# Patient Record
Sex: FEMALE | Race: OTHER | HISPANIC OR LATINO | Employment: FULL TIME | URBAN - METROPOLITAN AREA
[De-identification: names, ages, dates, MRNs, and addresses within clinical notes are randomized per-mention and may not be internally consistent; named-entity substitution may affect disease eponyms.]

---

## 2017-04-13 ENCOUNTER — HOSPITAL ENCOUNTER (EMERGENCY)
Facility: HOSPITAL | Age: 27
Discharge: HOME | End: 2017-04-13
Attending: EMERGENCY MEDICINE | Admitting: EMERGENCY MEDICINE
Payer: COMMERCIAL

## 2017-04-13 VITALS
OXYGEN SATURATION: 98 % | HEART RATE: 72 BPM | BODY MASS INDEX: 20.01 KG/M2 | TEMPERATURE: 98.1 F | SYSTOLIC BLOOD PRESSURE: 99 MMHG | RESPIRATION RATE: 18 BRPM | WEIGHT: 124 LBS | DIASTOLIC BLOOD PRESSURE: 55 MMHG

## 2017-04-13 DIAGNOSIS — B37.3 VULVOVAGINAL CANDIDIASIS: Primary | ICD-10-CM

## 2017-04-13 LAB
BACTERIA UR QL AUTO: ABNORMAL /HPF
BILIRUB UR QL STRIP: ABNORMAL
CLARITY UR: CLEAR
COLOR UR: YELLOW
GLUCOSE UR STRIP-MCNC: NEGATIVE MG/DL
HCG UR QL: NEGATIVE
HGB UR QL STRIP.AUTO: NEGATIVE
KETONES UR STRIP-MCNC: ABNORMAL MG/DL
LEUKOCYTE ESTERASE UR QL STRIP: ABNORMAL
MUCOUS THREADS UR QL AUTO: ABNORMAL
NITRITE UR QL STRIP: NEGATIVE
NON-SQ EPI CELLS URNS QL MICRO: ABNORMAL /HPF
PH UR STRIP.AUTO: 6 [PH] (ref 4.5–8)
PROT UR STRIP-MCNC: ABNORMAL MG/DL
RBC #/AREA URNS AUTO: ABNORMAL /HPF
SP GR UR STRIP.AUTO: 1.02 (ref 1–1.03)
UROBILINOGEN UR QL STRIP.AUTO: 1 E.U./DL
WBC #/AREA URNS AUTO: ABNORMAL /HPF

## 2017-04-13 PROCEDURE — 81002 URINALYSIS NONAUTO W/O SCOPE: CPT | Performed by: PHYSICIAN ASSISTANT

## 2017-04-13 PROCEDURE — 81001 URINALYSIS AUTO W/SCOPE: CPT

## 2017-04-13 PROCEDURE — 87086 URINE CULTURE/COLONY COUNT: CPT

## 2017-04-13 PROCEDURE — 99283 EMERGENCY DEPT VISIT LOW MDM: CPT

## 2017-04-13 PROCEDURE — 81025 URINE PREGNANCY TEST: CPT | Performed by: PHYSICIAN ASSISTANT

## 2017-04-13 RX ORDER — FLUCONAZOLE 150 MG/1
150 TABLET ORAL ONCE
Status: COMPLETED | OUTPATIENT
Start: 2017-04-13 | End: 2017-04-13

## 2017-04-13 RX ADMIN — FLUCONAZOLE 150 MG: 150 TABLET ORAL at 06:46

## 2017-04-14 LAB — BACTERIA UR CULT: NORMAL

## 2017-07-13 ENCOUNTER — HOSPITAL ENCOUNTER (EMERGENCY)
Facility: HOSPITAL | Age: 27
Discharge: HOME | End: 2017-07-13
Admitting: EMERGENCY MEDICINE
Payer: COMMERCIAL

## 2017-07-13 VITALS
RESPIRATION RATE: 16 BRPM | BODY MASS INDEX: 21.79 KG/M2 | WEIGHT: 135 LBS | OXYGEN SATURATION: 99 % | DIASTOLIC BLOOD PRESSURE: 54 MMHG | SYSTOLIC BLOOD PRESSURE: 116 MMHG | TEMPERATURE: 99.1 F | HEART RATE: 90 BPM

## 2017-07-13 DIAGNOSIS — T63.441A BEE STING: Primary | ICD-10-CM

## 2017-07-13 PROCEDURE — 99282 EMERGENCY DEPT VISIT SF MDM: CPT

## 2017-07-13 RX ORDER — NAPROXEN 500 MG/1
500 TABLET ORAL 2 TIMES DAILY WITH MEALS
Qty: 30 TABLET | Refills: 0 | Status: SHIPPED | OUTPATIENT
Start: 2017-07-13 | End: 2017-12-08

## 2017-12-08 ENCOUNTER — APPOINTMENT (EMERGENCY)
Dept: ULTRASOUND IMAGING | Facility: HOSPITAL | Age: 27
End: 2017-12-08
Payer: COMMERCIAL

## 2017-12-08 ENCOUNTER — HOSPITAL ENCOUNTER (EMERGENCY)
Facility: HOSPITAL | Age: 27
Discharge: HOME | End: 2017-12-08
Attending: EMERGENCY MEDICINE | Admitting: EMERGENCY MEDICINE
Payer: COMMERCIAL

## 2017-12-08 VITALS
WEIGHT: 118.83 LBS | RESPIRATION RATE: 17 BRPM | BODY MASS INDEX: 19.18 KG/M2 | HEART RATE: 72 BPM | SYSTOLIC BLOOD PRESSURE: 122 MMHG | OXYGEN SATURATION: 100 % | DIASTOLIC BLOOD PRESSURE: 87 MMHG | TEMPERATURE: 98.4 F

## 2017-12-08 DIAGNOSIS — Z20.2 EXPOSURE TO SEXUALLY TRANSMITTED DISEASE (STD): ICD-10-CM

## 2017-12-08 DIAGNOSIS — R10.9 ABDOMINAL CRAMPING: ICD-10-CM

## 2017-12-08 DIAGNOSIS — N73.0 PID (ACUTE PELVIC INFLAMMATORY DISEASE): Primary | ICD-10-CM

## 2017-12-08 LAB
ALBUMIN SERPL BCP-MCNC: 4 G/DL (ref 3.5–5)
ALP SERPL-CCNC: 60 U/L (ref 46–116)
ALT SERPL W P-5'-P-CCNC: 43 U/L (ref 12–78)
ANION GAP SERPL CALCULATED.3IONS-SCNC: 9 MMOL/L (ref 4–13)
AST SERPL W P-5'-P-CCNC: 23 U/L (ref 5–45)
BASOPHILS # BLD AUTO: 0.04 THOUSANDS/ΜL (ref 0–0.1)
BASOPHILS NFR BLD AUTO: 1 % (ref 0–1)
BILIRUB SERPL-MCNC: 0.36 MG/DL (ref 0.2–1)
BILIRUB UR QL STRIP: NEGATIVE
BUN SERPL-MCNC: 11 MG/DL (ref 5–25)
CALCIUM SERPL-MCNC: 9.1 MG/DL (ref 8.3–10.1)
CHLORIDE SERPL-SCNC: 106 MMOL/L (ref 100–108)
CLARITY UR: CLEAR
CO2 SERPL-SCNC: 26 MMOL/L (ref 21–32)
COLOR UR: YELLOW
COLOR, POC: YELLOW
CREAT SERPL-MCNC: 0.64 MG/DL (ref 0.6–1.3)
EOSINOPHIL # BLD AUTO: 0.1 THOUSAND/ΜL (ref 0–0.61)
EOSINOPHIL NFR BLD AUTO: 1 % (ref 0–6)
ERYTHROCYTE [DISTWIDTH] IN BLOOD BY AUTOMATED COUNT: 15 % (ref 11.6–15.1)
EXT PREG TEST URINE: NEGATIVE
GFR SERPL CREATININE-BSD FRML MDRD: 123 ML/MIN/1.73SQ M
GLUCOSE SERPL-MCNC: 85 MG/DL (ref 65–140)
GLUCOSE UR STRIP-MCNC: NEGATIVE MG/DL
HCT VFR BLD AUTO: 34 % (ref 34.8–46.1)
HGB BLD-MCNC: 11.1 G/DL (ref 11.5–15.4)
HGB UR QL STRIP.AUTO: NEGATIVE
HIV 1+2 AB+HIV1 P24 AG SERPL QL IA: NORMAL
HIV1 P24 AG SER QL: NORMAL
KETONES UR STRIP-MCNC: NEGATIVE MG/DL
LEUKOCYTE ESTERASE UR QL STRIP: NEGATIVE
LIPASE SERPL-CCNC: 157 U/L (ref 73–393)
LYMPHOCYTES # BLD AUTO: 1.86 THOUSANDS/ΜL (ref 0.6–4.47)
LYMPHOCYTES NFR BLD AUTO: 26 % (ref 14–44)
MCH RBC QN AUTO: 27.8 PG (ref 26.8–34.3)
MCHC RBC AUTO-ENTMCNC: 32.6 G/DL (ref 31.4–37.4)
MCV RBC AUTO: 85 FL (ref 82–98)
MONOCYTES # BLD AUTO: 0.67 THOUSAND/ΜL (ref 0.17–1.22)
MONOCYTES NFR BLD AUTO: 10 % (ref 4–12)
NEUTROPHILS # BLD AUTO: 4.39 THOUSANDS/ΜL (ref 1.85–7.62)
NEUTS SEG NFR BLD AUTO: 62 % (ref 43–75)
NITRITE UR QL STRIP: NEGATIVE
NRBC BLD AUTO-RTO: 0 /100 WBCS
PH UR STRIP.AUTO: 6 [PH] (ref 4.5–8)
PLATELET # BLD AUTO: 136 THOUSANDS/UL (ref 149–390)
PMV BLD AUTO: 12.1 FL (ref 8.9–12.7)
POTASSIUM SERPL-SCNC: 3.6 MMOL/L (ref 3.5–5.3)
PROT SERPL-MCNC: 6.9 G/DL (ref 6.4–8.2)
PROT UR STRIP-MCNC: NEGATIVE MG/DL
RBC # BLD AUTO: 4 MILLION/UL (ref 3.81–5.12)
SODIUM SERPL-SCNC: 141 MMOL/L (ref 136–145)
SP GR UR STRIP.AUTO: 1.02 (ref 1–1.03)
UROBILINOGEN UR QL STRIP.AUTO: 0.2 E.U./DL
WBC # BLD AUTO: 7.06 THOUSAND/UL (ref 4.31–10.16)

## 2017-12-08 PROCEDURE — 85025 COMPLETE CBC W/AUTO DIFF WBC: CPT | Performed by: EMERGENCY MEDICINE

## 2017-12-08 PROCEDURE — 81002 URINALYSIS NONAUTO W/O SCOPE: CPT | Performed by: EMERGENCY MEDICINE

## 2017-12-08 PROCEDURE — 76830 TRANSVAGINAL US NON-OB: CPT

## 2017-12-08 PROCEDURE — 87591 N.GONORRHOEAE DNA AMP PROB: CPT | Performed by: EMERGENCY MEDICINE

## 2017-12-08 PROCEDURE — 76856 US EXAM PELVIC COMPLETE: CPT

## 2017-12-08 PROCEDURE — 99284 EMERGENCY DEPT VISIT MOD MDM: CPT

## 2017-12-08 PROCEDURE — 81025 URINE PREGNANCY TEST: CPT | Performed by: EMERGENCY MEDICINE

## 2017-12-08 PROCEDURE — 87491 CHLMYD TRACH DNA AMP PROBE: CPT | Performed by: EMERGENCY MEDICINE

## 2017-12-08 PROCEDURE — 96372 THER/PROPH/DIAG INJ SC/IM: CPT

## 2017-12-08 PROCEDURE — 80053 COMPREHEN METABOLIC PANEL: CPT | Performed by: EMERGENCY MEDICINE

## 2017-12-08 PROCEDURE — 81003 URINALYSIS AUTO W/O SCOPE: CPT

## 2017-12-08 PROCEDURE — 36415 COLL VENOUS BLD VENIPUNCTURE: CPT | Performed by: EMERGENCY MEDICINE

## 2017-12-08 PROCEDURE — 83690 ASSAY OF LIPASE: CPT | Performed by: EMERGENCY MEDICINE

## 2017-12-08 PROCEDURE — 87806 HIV AG W/HIV1&2 ANTB W/OPTIC: CPT | Performed by: EMERGENCY MEDICINE

## 2017-12-08 RX ORDER — DOXYCYCLINE HYCLATE 100 MG/1
100 CAPSULE ORAL ONCE
Status: COMPLETED | OUTPATIENT
Start: 2017-12-08 | End: 2017-12-08

## 2017-12-08 RX ORDER — DOXYCYCLINE HYCLATE 100 MG/1
100 CAPSULE ORAL 2 TIMES DAILY
Qty: 27 CAPSULE | Refills: 0 | Status: SHIPPED | OUTPATIENT
Start: 2017-12-08 | End: 2017-12-22

## 2017-12-08 RX ADMIN — DOXYCYCLINE 100 MG: 100 CAPSULE ORAL at 19:12

## 2017-12-08 RX ADMIN — LIDOCAINE HYDROCHLORIDE 250 MG: 10 INJECTION, SOLUTION EPIDURAL; INFILTRATION; INTRACAUDAL; PERINEURAL at 19:12

## 2017-12-08 NOTE — ED PROVIDER NOTES
History  Chief Complaint   Patient presents with    Vaginal Discharge     Lower abdominal cramping, right flank pain, "way way way too much discharge" from vagina "off-color, not clear " Denies urinary symptoms  51-year-old female comes in for evaluation lower abdominal pain  She also states I have way too much discharge  Has been going on for 1 week  She denies that she could be pregnant  She does state she does have a boyfriend who was given her STDs x 5 and she is still with him  Documented gonorrhea and chlamydia in the past   She notes that she has cramping; bilateral lower quadrants  History of appendectomy  Has never had this before  Would like to be tested for HIV  Denies vomiting; but has had nausea  Subjective fevers; but is afebrile here  Has not been taking anything for symptoms  She denies dysuria; frequency  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 therefore she states she has pyelonephritis frequently  Her vital signs are unremarkable  On physical exam she is very tender to palpation bilateral lower quadrants  Also suprapubic  She does have white to yellow discharge; on pelvic exam   Very tender on bimanual exam   HIV screening per patient  Basic lab work  Gonorrhea/chlamydia culture swab obtained  Transvaginal ultrasound  None       Past Medical History:   Diagnosis Date    Depression     History of positive PPD     Known health problems: none     Pelvic kidney     Pelvic kidney     left side       Past Surgical History:   Procedure Laterality Date    APPENDECTOMY         Family History   Problem Relation Age of Onset    Crohn's disease Brother     Crohn's disease Maternal Aunt     Diabetes Maternal Grandmother     Diabetes Maternal Grandfather     Diabetes Paternal Grandmother     Diabetes Paternal Grandfather      I have reviewed and agree with the history as documented      Social History   Substance Use Topics    Smoking status: Light Tobacco Smoker    Smokeless tobacco: Never Used    Alcohol use Yes        Review of Systems   Constitutional: Positive for fever  Negative for activity change, appetite change and chills  HENT: Negative for congestion, ear pain, rhinorrhea, sore throat and trouble swallowing  Eyes: Negative for photophobia and pain  Respiratory: Negative for cough, chest tightness, shortness of breath and wheezing  Cardiovascular: Negative for chest pain and palpitations  Gastrointestinal: Positive for nausea  Negative for abdominal distention, abdominal pain, constipation, diarrhea and vomiting  Genitourinary: Positive for flank pain, vaginal discharge and vaginal pain  Negative for difficulty urinating, dysuria, frequency, hematuria and urgency  Musculoskeletal: Negative for arthralgias, back pain, joint swelling, neck pain and neck stiffness  Skin: Negative for color change, pallor and rash  Neurological: Negative for dizziness, seizures, weakness, light-headedness and headaches  Hematological: Negative for adenopathy  Psychiatric/Behavioral: Negative for confusion, hallucinations and self-injury  Physical Exam  ED Triage Vitals [12/08/17 1615]   Temperature Pulse Respirations Blood Pressure SpO2   98 2 °F (36 8 °C) 95 18 110/52 100 %      Temp Source Heart Rate Source Patient Position - Orthostatic VS BP Location FiO2 (%)   Oral Monitor Sitting Right arm --      Pain Score       8           Orthostatic Vital Signs  Vitals:    12/08/17 1615 12/08/17 1849 12/08/17 2058   BP: 110/52 116/82 122/87   Pulse: 95 68 72   Patient Position - Orthostatic VS: Sitting         Physical Exam   Constitutional: She is oriented to person, place, and time  She appears well-developed and well-nourished  No distress  HENT:   Head: Normocephalic and atraumatic  Right Ear: External ear normal    Left Ear: External ear normal    Mouth/Throat: Oropharynx is clear and moist  No oropharyngeal exudate     Eyes: Conjunctivae and EOM are normal  Pupils are equal, round, and reactive to light  Right eye exhibits no discharge  Left eye exhibits no discharge  Neck: Normal range of motion  Neck supple  No tracheal deviation present  No thyromegaly present  Cardiovascular: Normal rate, normal heart sounds and intact distal pulses  No murmur heard  Pulmonary/Chest: Effort normal and breath sounds normal  No respiratory distress  She has no wheezes  She has no rales  Abdominal: Soft  Bowel sounds are normal  She exhibits no distension  There is tenderness  There is no rebound and no guarding  Patient is very sensitive palpation bilateral lower quadrants  Also suprapubic region  Genitourinary: Vaginal discharge found  Genitourinary Comments: On pelvic exam does have white/yellow vaginal discharge  Relatively significant amount  Cervix was visualized without abnormality  Culture was taken  Bimanual exam will only allow very limited palpation as extremely sensitive  Abdomen though overall is soft and does not appear peritoneal in nature  Musculoskeletal: Normal range of motion  She exhibits no edema or deformity  Lymphadenopathy:     She has no cervical adenopathy  Neurological: She is alert and oriented to person, place, and time  She exhibits normal muscle tone  Skin: Skin is warm  No rash noted  She is not diaphoretic  Psychiatric: She has a normal mood and affect   Her behavior is normal        ED Medications  Medications   cefTRIAXone (ROCEPHIN) 250 mg in lidocaine (PF) (XYLOCAINE-MPF) 1 % IM only syringe (250 mg Intramuscular Given 12/8/17 1912)   doxycycline hyclate (VIBRAMYCIN) capsule 100 mg (100 mg Oral Given 12/8/17 1912)       Diagnostic Studies  Results Reviewed     Procedure Component Value Units Date/Time    Rapid HIV 1/2 AB-AG Combo [[id number]]  (Normal) Collected:  12/08/17 1840    Lab Status:  Final result Specimen:  Blood from Arm, Right Updated:  12/08/17 1920     Rapid HIV 1 AND 2 Non-Reactive     HIV-1 P24 Ag Screen Non-Reactive    Narrative:         Negative for HIV-1 p24 Antigen  Negative for HIV-1 and/or HIV-2 Antibody  Comprehensive metabolic panel [[id number]] Collected:  12/08/17 1840    Lab Status:  Final result Specimen:  Blood from Arm, Right Updated:  12/08/17 1902     Sodium 141 mmol/L      Potassium 3 6 mmol/L      Chloride 106 mmol/L      CO2 26 mmol/L      Anion Gap 9 mmol/L      BUN 11 mg/dL      Creatinine 0 64 mg/dL      Glucose 85 mg/dL      Calcium 9 1 mg/dL      AST 23 U/L      ALT 43 U/L      Alkaline Phosphatase 60 U/L      Total Protein 6 9 g/dL      Albumin 4 0 g/dL      Total Bilirubin 0 36 mg/dL      eGFR 123 ml/min/1 73sq m     Narrative:         National Kidney Disease Education Program recommendations are as follows:  GFR calculation is accurate only with a steady state creatinine  Chronic Kidney disease less than 60 ml/min/1 73 sq  meters  Kidney failure less than 15 ml/min/1 73 sq  meters      Lipase [[id number]]  (Normal) Collected:  12/08/17 1840    Lab Status:  Final result Specimen:  Blood from Arm, Right Updated:  12/08/17 1902     Lipase 157 u/L     CBC and differential [[id number]]  (Abnormal) Collected:  12/08/17 1840    Lab Status:  Final result Specimen:  Blood from Arm, Right Updated:  12/08/17 1849     WBC 7 06 Thousand/uL      RBC 4 00 Million/uL      Hemoglobin 11 1 (L) g/dL      Hematocrit 34 0 (L) %      MCV 85 fL      MCH 27 8 pg      MCHC 32 6 g/dL      RDW 15 0 %      MPV 12 1 fL      Platelets 809 (L) Thousands/uL      nRBC 0 /100 WBCs      Neutrophils Relative 62 %      Lymphocytes Relative 26 %      Monocytes Relative 10 %      Eosinophils Relative 1 %      Basophils Relative 1 %      Neutrophils Absolute 4 39 Thousands/µL      Lymphocytes Absolute 1 86 Thousands/µL      Monocytes Absolute 0 67 Thousand/µL      Eosinophils Absolute 0 10 Thousand/µL      Basophils Absolute 0 04 Thousands/µL     Chlamydia/GC amplified DNA by PCR [[id number]] Collected:  12/08/17 1833    Lab Status: In process Specimen:  Genital from Cervix Updated:  12/08/17 1835    POCT pregnancy, urine [[id number]]  (Normal) Resulted:  12/08/17 1731    Lab Status:  Final result Updated:  12/08/17 1731     EXT PREG TEST UR (Ref: Negative) negative    POCT urinalysis dipstick [[id number]]  (Normal) Resulted:  12/08/17 1731    Lab Status:  Final result Updated:  12/08/17 1731     Color, UA yellow    ED Urine Macroscopic [[id number]]  (Normal) Collected:  12/08/17 1746    Lab Status:  Final result Specimen:  Urine Updated:  12/08/17 1730     Color, UA Yellow     Clarity, UA Clear     pH, UA 6 0     Leukocytes, UA Negative     Nitrite, UA Negative     Protein, UA Negative mg/dl      Glucose, UA Negative mg/dl      Ketones, UA Negative mg/dl      Urobilinogen, UA 0 2 E U /dl      Bilirubin, UA Negative     Blood, UA Negative     Specific Gravity, UA 1 025    Narrative:       CLINITEK RESULT                 US pelvis complete w transvaginal   Final Result by Ha Anderson MD (12/08 2035)   No pelvic adnexal mass seen   Trace amount of free fluid noted    Multiple ovarian follicles in the both ovaries         Workstation performed: [id number]               Procedures  Procedures      Phone Consults  ED Phone Contact    ED Course  ED Course as of Dec 09 0012   Fri Dec 08, 2017   2058 RAPID HIV 1 AND 2: Non-Reactive   2059 HIV-1 P24 ANTIGEN SCREEN: Non-Reactive                               MDM  Number of Diagnoses or Management Options  Abdominal cramping:   Exposure to sexually transmitted disease (STD):   PID (acute pelvic inflammatory disease):   Diagnosis management comments: Patient with history of prior sexually transmitted disease  Comes in today with vaginal discharge and lower pelvic pain    Pelvic exam showed white/yellow discharge and significant tenderness on bimanual exam   Formal transvaginal ultrasound obtained no evidence tubo-ovarian abscess or ultrasonicgraphic evidence of PID   Patient without leukocytosis or any systemic findings  She is afebrile and hemodynamically stable  Given high risk for sexually transmitted disease and exam findings given IM dose of ceftriaxone and given prolonged course of doxycycline for suspected pelvic inflammatory disease  Told she must follow up with gynecology clinic and given their information  HIV rapid testing negative  Understands return precautions  Cervical cultures were obtained were sent to lab pending analysis  Despite result though patient was treated empirically  CritCare Time    Disposition  Final diagnoses:   PID (acute pelvic inflammatory disease)   Abdominal cramping   Exposure to sexually transmitted disease (STD)     Time reflects when diagnosis was documented in both MDM as applicable and the Disposition within this note     Time User Action Codes Description Comment    12/8/2017  9:00 PM Helyn Tiera Add [N73 0] PID (acute pelvic inflammatory disease)     12/8/2017  9:00 PM Helyn Tiera Add [R10 9] Abdominal cramping     12/8/2017  9:00 PM Helyn Tiera Add [Z20 2] Exposure to sexually transmitted disease (STD)       ED Disposition     ED Disposition Condition Comment    Discharge  Loise Hipps discharge to home/self care      Condition at discharge: Good        Follow-up Information     Follow up With Specialties Details Why Contact Info Additional Information    [address] and Delivery Obstetrics and Gynecology Schedule an appointment as soon as possible for a visit Follow-up PID [facility]  [phone] [address] [place] [address] L&D, [address] [place] [address] [address] [place], [place], [address]        Discharge Medication List as of 12/8/2017  9:01 PM      START taking these medications    Details   doxycycline hyclate (VIBRAMYCIN) 100 mg capsule Take 1 capsule by mouth 2 (two) times a day for 14 days, Starting Fri 12/8/2017, Until Fri 12/22/2017, Print           No discharge procedures on file  ED Provider  Attending physically available and evaluated Jairo Stevens I managed the patient along with the ED Attending      Electronically Signed by         Raghu Brand DO  Resident  12/09/17 0011       Raghu Brand DO  Resident  12/09/17 0726

## 2017-12-08 NOTE — ED ATTENDING ATTESTATION
Christine Odonnell MD, saw and evaluated the patient  I have discussed the patient with the resident/non-physician practitioner and agree with the resident's/non-physician practitioner's findings, Plan of Care, and MDM as documented in the resident's/non-physician practitioner's note, except where noted  All available labs and Radiology studies were reviewed  At this point I agree with the current assessment done in the Emergency Department  I have conducted an independent evaluation of this patient a history and physical is as follows:  31 yo female c/o vaginal discharge, associated with nausea, mild lower pelvic pain, without vomiting, no fever, no other urinary symptoms  She has been treated on more than 1 occasion for GC/Chlamydia  VS normal   Pelvic exam significant for vaginal discharge, CMT, without bleeding, and signicant pelvic pain in both adnexa  Differential includes PID, TOA, so I agree with labs and imaging, along with empiric treatment and pain control  Disposition according to findings and response to treatment        Critical Care Time  CritCare Time

## 2017-12-09 NOTE — DISCHARGE INSTRUCTIONS
Abdominal Pain   WHAT YOU NEED TO KNOW:   Abdominal pain can be dull, achy, or sharp  You may have pain in one area of your abdomen, or in your entire abdomen  Your pain may be caused by a condition such as constipation, food sensitivity or poisoning, infection, or a blockage  Abdominal pain can also be from a hernia, appendicitis, or an ulcer  Liver, gallbladder, or kidney conditions can also cause abdominal pain  The cause of your abdominal pain may be unknown  DISCHARGE INSTRUCTIONS:   Return to the emergency department if:   · You have new chest pain or shortness of breath  · You have pulsing pain in your upper abdomen or lower back that suddenly becomes constant  · Your pain is in the right lower abdominal area and worsens with movement  · You have a fever over 100 4°F (38°C) or shaking chills  · You are vomiting and cannot keep food or liquids down  · Your pain does not improve or gets worse over the next 8 to 12 hours  · You see blood in your vomit or bowel movements, or they look black and tarry  · Your skin or the whites of your eyes turn yellow  · You are a woman and have a large amount of vaginal bleeding that is not your monthly period  Contact your healthcare provider if:   · You have pain in your lower back  · You are a man and have pain in your testicles  · You have pain when you urinate  · You have questions or concerns about your condition or care  Follow up with your healthcare provider within 24 hours or as directed:  Write down your questions so you remember to ask them during your visits  Medicines:   · Medicines  may be given to calm your stomach and prevent vomiting or to decrease pain  Ask how to take pain medicine safely  · Take your medicine as directed  Contact your healthcare provider if you think your medicine is not helping or if you have side effects  Tell him of her if you are allergic to any medicine   Keep a list of the medicines, vitamins, and herbs you take  Include the amounts, and when and why you take them  Bring the list or the pill bottles to follow-up visits  Carry your medicine list with you in case of an emergency  © 2017 [employer]  Information is for End User's use only and may not be sold, redistributed or otherwise used for commercial purposes  All illustrations and images included in CareNotes® are the copyrighted property of [employer]  or [employer]  The above information is an  only  It is not intended as medical advice for individual conditions or treatments  Talk to your doctor, nurse or pharmacist before following any medical regimen to see if it is safe and effective for you  Pelvic Pain   WHAT YOU NEED TO KNOW:   Pelvic pain may be caused by a number of conditions, such as irritable bowel syndrome, appendicitis, or constipation  A urinary tract infection, prostate inflammation, menstrual cramps, or kidney stones can also cause pelvic pain  You may have pain on one or both sides of your pelvis  Pelvic pain can develop if you have trauma to your pelvis or if you sit or stand for a long time  DISCHARGE INSTRUCTIONS:   Medicines: You may need any of the following:  · NSAIDs , such as ibuprofen, help decrease swelling, pain, and fever  This medicine is available with or without a doctor's order  NSAIDs can cause stomach bleeding or kidney problems in certain people  If you take blood thinner medicine, always ask your healthcare provider if NSAIDs are safe for you  Always read the medicine label and follow directions  · Prescription pain medicine  may be given  Ask how to take this medicine safely  · Birth control medicines  may help decrease pain in women  · Take your medicine as directed  Contact your healthcare provider if you think your medicine is not helping or if you have side effects  Tell him or her if you are allergic to any medicine   Keep a list of the medicines, vitamins, and herbs you take  Include the amounts, and when and why you take them  Bring the list or the pill bottles to follow-up visits  Carry your medicine list with you in case of an emergency  Call 911 for any of the following:   · You have severe chest pain and sudden trouble breathing  Contact your healthcare provider if:   · You have a fever  · You have nausea, vomiting, or diarrhea  · Your pain does not go away, or it gets worse, even after treatment  · You have numbness in your legs or toes  · You have heavy or unusual vaginal bleeding  · You have questions or concerns about your condition or care  Manage your pelvic pain:   · Keep a pain record  Write down when your pain happens and how severe it is  Include any other symptoms you have with your pain  A record will help you keep track of pain cycles  Bring the record with you to your follow-up visits  It may also help your healthcare provider find out what is causing your pain  · Learn ways to relax  Deep breathing, meditation, and relaxation techniques can help decrease your pain  When you are tense, your pain may increase  · Treat or prevent constipation  Drink liquids as directed  You may need to drink more liquid than usual  Ask your healthcare provider how much liquid to drink each day  Eat high-fiber foods  High-fiber foods include fruit, vegetables, whole-grain breads and cereals, and beans  You may need to change the foods you eat if you have irritable bowel syndrome  Follow up with your healthcare provider as directed: You may need physical therapy  You may need to see an orthopedic specialist  Write down your questions so you remember to ask them during your visits  © 2017 [employer] Information is for End User's use only and may not be sold, redistributed or otherwise used for commercial purposes   All illustrations and images included in CareNotes® are the copyrighted property of A  [employer]  or [employer]  The above information is an  only  It is not intended as medical advice for individual conditions or treatments  Talk to your doctor, nurse or pharmacist before following any medical regimen to see if it is safe and effective for you

## 2017-12-11 LAB
CHLAMYDIA DNA CVX QL NAA+PROBE: NORMAL
N GONORRHOEA DNA GENITAL QL NAA+PROBE: NORMAL

## 2018-01-16 NOTE — MISCELLANEOUS
Provider Comments  Provider Comments:   PT NO SHOW FOR 05/26/2016 PT WAS CALLED AND LM LAST NIGHT FROM OUR OFFICE  PT WAS A NEW PT FOR A POST PARTUM PT HAD STATED SHE WAS A PT IN [place] FOR HER PRENATAL  Signatures   Electronically signed by :  [facility], ; May 26 2016 10:33AM EST                       (Author)    Electronically signed by : Deborah Butler DO; May 26 2016  2:55PM EST                       (Author)

## 2018-01-16 NOTE — PROGRESS NOTES
2016         RE: Gilma Iverson                                   To: [facility]   MR#: [id number]                                    [address]   : Amber Ortega, [facility]   ENC: [id number]                             Fax: [phone]   (Exam #: [id number])      The LMP of this 22year old,  4, para 3 patient was unknown, her   working JUAN is 2016 and the current gestational age is 31 weeks 3   days by 2rd Trimester Sono  A sonographic examination was performed on 2016 using real time equipment  The ultrasound examination was performed   using abdominal technique  The patient has a BMI of 23 2  Her blood   pressure today was 121/59  Pt unsure of LMP  Earliest ultrasound found in her record: [facility] 16     30w3d 16 JUAN       Thank you very much for referring this very nice patient for a    consultation and fetal anatomic survey  The patient has a short interval   pregnancy and was late to prenatal care  She has had 3 previous full-term   vaginal deliveries  She had a son born in  who weighed 5 lbs  14 oz  Her daughter born in  weighed 5 pounds and had pyelectasis  She also   had a complicated birth and developed respiratory syncytial virus shortly   after birth requiring significant intervention during the first few weeks   of life  She is otherwise healthy male  Her son was born in April of   this past year and weight 6 lbs  7 oz  His delivery was uneventful  She   has occasional depression  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  She currently smokes a few cigarettes a day denies alcohol or illicit drug   use  Her medications include vitamins and she has an allergy to Reglan  Other than the patient's pelvic kidney and her daughter with reflux, there   is no significant family medical history  The review of systems is   otherwise negative   On exam, the patient appears well, in no acute   distress, and her abdomen is nontender  Cardiac motion was observed at 137 bpm       INDICATIONS      fetal anatomical survey   confirm gestational age   late prenatal care   short interval pregnancy      Exam Types      LEVEL II      RESULTS      Fetus # 1 of 1   Vertex presentation   Fetal growth appeared normal   Placenta Location = Anterior   No placenta previa   Placenta Grade = II      MEASUREMENTS (* Included In Average GA)      OFD             10 3 cm   AC              26 1 cm        30 weeks 1 day * (43%)   BPD              7 5 cm        29 weeks 6 days* (33%)   HC              28 9 cm        31 weeks 3 days* (52%)   Femur            5 7 cm        29 weeks 6 days* (37%)      Humerus          5 2 cm        30 weeks 1 day   (48%)   Radius           4 4 cm        31 weeks 6 days   Ulna             4 8 cm        30 weeks 5 days   Tibia            5 0 cm        29 weeks 6 days  (41%)   Fibula           4 7 cm        27 weeks 5 days   Foot             6 1 cm        30 weeks 5 days      Cerebellum       3 6 cm        31 weeks 2 days   CisternaMagna   10 0 mm      HC/AC           1 11   FL/AC           0 22   FL/BPD          0 77   EFW (Ac/Fl/Hc)  1561 grams - 3 lbs 7 oz                 (45%)      THE AVERAGE GESTATIONAL AGE is 30 weeks 3 days +/- 18 days  AMNIOTIC FLUID      Q1: 3 7      Q2: 3 0      Q3: 3 9      Q4: 5 6   ZOHRA Total = 16 2 cm   Amniotic Fluid: Normal      FETAL VESSELS                                     S/D   PI    RI    PSV   AEDV RF                                                    cm/s       Umbilical Artery           2 82  0 92  0 65      ANATOMY DETAILS      Visualized Appearing Sonographically Normal:   HEAD: (Calvarium, BPD Level, Lateral Ventricles, Choroid Plexus,   Cerebellum, Cisterna Magna);    FACE/NECK: (Neck, Nuchal Fold, Profile,   Orbits, Nose/Lips, Palate, Face);    TH  CAV : (Diaphragm);     HEART:   ([facility], Proximal Left Outflow, Proximal Right Outflow, Aortic   Arch, Short Axis of Greater Vessels, Cardiac Axis, Interventricular   Septum, Interatrial Septum, Cardiac Position);    STOMACH, RIGHT KIDNEY,   LEFT KIDNEY, BLADDER, ABD  WALL, SPINE: (Cervical Spine, Thoracic Spine,   Lumbar Spine, Sacrum);    EXTREMS: (Lt Humerus, Rt Humerus, Lt Forearm, Rt   Forearm, Lt Femur, Rt Femur, Lt Low Leg, Lt Foot, Rt Foot);    GENITALIA,   PLACENTA, UMBL  CORD, UTERUS      Not Visualized:   HEART: (Ductal Arch);    EXTREMS: (Lt Hand, Rt Hand, Rt Low Leg)      Abnormal:   ABD  CAV  ADNEXA      The left ovary appeared normal and measured 3 2 x 3 7 x 2 0 cm with a   volume of 12 4 cc  The right ovary appeared normal and measured 3 0 x 3 1   x 2 1 cm with a volume of 10 2 cc  IMPRESSION      Muir IUP   30 weeks and 3 days by this ultrasound  (JUAN=APR 11 2016)   30 weeks and 3 days by 3rd Trimester Sono  (JUAN=APR 11 2016)   Vertex presentation   Fetal growth appeared normal   Regular fetal heart rate of 137 bpm   Ovarian cyst   Anterior placenta   No placenta previa      GENERAL COMMENT      The fetal anatomic survey is complete except for suboptimal visualization   of the ductal arch and fetal extremities listed above  Good fetal   movement and tone is seen  The amniotic fluid volume appears normal   The   placenta is anterior and it appears sonographically normal       The patient does not want to know the fetal sex but the father the baby   does know the fetal sex  This is important because there appears to be an   anechoic cystic structure within the fetal pelvis on the left side,   distinct from the left fetal kidney and  simple in architecture  This   cyst measures 1 25 x 1 31 x 1 16 cm and most likely represents a left   fetal ovarian cyst   I did not tell the patient about this but did tell   the   I described to the patient that I see a pelvic cyst which   appears to be benign in nature    Recommend followup in 4 weeks to   reevaluate this to ensure that it does not grow significantly as it   sometimes can cause ovarian torsion  The patient was informed of today's findings and all of her questions were   answered  The limitations of ultrasound were reviewed with the patient,   which she accepts  Precautions were reviewed  Thank you very much for allowing us to participate in the care of this   very nice patient  Should you have any questions, please do not hesitate   to contact our office  Please note, in addition to the time spent discussing the results of the   ultrasound, I spent approximately 10 minutes of face-to-face time with the   patient, greater than 50% of which was spent in counseling and the   coordination of care for this patient  RECOMMENDATION      Growth Ultrasound: 4 Weeks      BRUNO Sims M D     Electronically signed 02/04/16 17:54

## 2018-02-26 ENCOUNTER — HOSPITAL ENCOUNTER (EMERGENCY)
Facility: HOSPITAL | Age: 28
Discharge: HOME | End: 2018-02-26
Admitting: EMERGENCY MEDICINE

## 2018-02-26 VITALS
BODY MASS INDEX: 19.37 KG/M2 | OXYGEN SATURATION: 100 % | HEART RATE: 99 BPM | DIASTOLIC BLOOD PRESSURE: 51 MMHG | SYSTOLIC BLOOD PRESSURE: 111 MMHG | TEMPERATURE: 99.1 F | RESPIRATION RATE: 18 BRPM | WEIGHT: 120 LBS

## 2018-02-26 DIAGNOSIS — J20.9 ACUTE BRONCHITIS: Primary | ICD-10-CM

## 2018-02-26 PROCEDURE — 99283 EMERGENCY DEPT VISIT LOW MDM: CPT

## 2018-02-26 RX ORDER — AZITHROMYCIN 250 MG/1
TABLET, FILM COATED ORAL
Qty: 6 TABLET | Refills: 0 | Status: SHIPPED | OUTPATIENT
Start: 2018-02-26 | End: 2018-03-03

## 2018-02-26 RX ORDER — BENZONATATE 100 MG/1
100 CAPSULE ORAL 3 TIMES DAILY PRN
Qty: 20 CAPSULE | Refills: 0 | Status: SHIPPED | OUTPATIENT
Start: 2018-02-26 | End: 2018-03-05

## 2018-02-27 NOTE — DISCHARGE INSTRUCTIONS
Tylenol or Motrin for fevers/pain  Saline spray for congestion you may use Mucinex for cough and congestion increase, fluids follow-up with the family doctor  Return to the emergency department for worsening symptoms  Acute Bronchitis   WHAT YOU SHOULD KNOW:   Acute bronchitis is swelling and irritation in the air passages of your lungs  This irritation may cause you to cough or have other breathing problems  Acute bronchitis often starts because of another viral illness, such as a cold or the flu  The illness spreads from your nose and throat to your windpipe and airways  Bronchitis is often called a chest cold  Acute bronchitis lasts about 2 weeks and is usually not a serious illness  AFTER YOU LEAVE:   Medicines:   · Ibuprofen or acetaminophen:  These medicines help lower a fever  They are available without a doctor's order  Ask your healthcare provider which medicine is right for you  Ask how much to take and how often to take it  Follow directions  These medicines can cause stomach bleeding if not taken correctly  Ibuprofen can cause kidney damage  Do not take ibuprofen if you have kidney disease, an ulcer, or allergies to aspirin  Acetaminophen can cause liver damage  Do not drink alcohol if you take acetaminophen  · Cough medicine: This medicine helps loosen mucus in your lungs and make it easier to cough up  This can help you breathe easier  · Inhalers: You may need one or more inhalers to help you breathe easier and cough less  An inhaler gives your medicine in a mist form so that you can breathe it into your lungs  Ask your healthcare provider to show you how to use your inhaler correctly  · Steroid medicine:  Steroid medicine helps open your air passages so you can breathe easier  · Take your medicine as directed  Call your healthcare provider if you think your medicine is not helping or if you have side effects  Tell him if you are allergic to any medicine   Keep a list of the medicines, vitamins, and herbs you take  Include the amounts, and when and why you take them  Bring the list or the pill bottles to follow-up visits  Carry your medicine list with you in case of an emergency  How to use an inhaler:   · Shake the inhaler well to make sure you get the correct amount of medicine per puff  Remove the cover from your inhaler's mouthpiece  If you are using a spacer, connect your inhaler to the flat end of the spacer  · Exhale as much air from your lungs as you can  Put the mouthpiece in your mouth past your front teeth and rest it on the top of your tongue  Do not block the mouthpiece opening with your tongue  · Breathe in through your mouth at a slow and steady rate  As you do this, press the inhaler to release the puff of medicine  Finish breathing in slowly and deeply as you inhale the medicine  When your lungs are full, hold your breath for 10 seconds  Then breathe out slowly through puckered lips or through your nose  · If you need to take more puffs, wait at least 1 minute between each puff  · Rinse your mouth with water after you use the inhaler  This may keep you from getting a mouth infection or irritation  · Follow the instructions that come with your inhaler to clean it  You should clean your inhaler at least once a week  Ways to care for yourself:   · Avoid alcohol:  Alcohol dulls your urge to cough and sneeze  When you have bronchitis, you need to be able to cough and sneeze to clear your air passages  Alcohol also causes your body to lose fluid  This can make the mucus in your lungs thicker and harder to cough up  · Avoid irritants in the air:  Do not smoke or allow others to smoke around you  Avoid chemicals, fumes, and dust  Wear a face mask if you must work around dust or fumes  Stay inside on days when air pollution levels are high  If you have allergies, stay inside when pollen counts are high  Avoid aerosol products   This includes spray-on deodorant, bug spray, and hair spray  · Drink more liquids:  Most people should drink at least 8 eight-ounce cups of water a day  You may need to drink more liquids when you have acute bronchitis  Liquids help keep your air passages moist and help you cough up mucus  · Get more rest:  You may feel like resting more  Slowly start to do more each day  Rest when you feel it is needed  · Eat healthy foods:  Eat a variety healthy foods every day  Your diet should include fruits, vegetables, breads, and protein (such as chicken, fish, and beans)  Dairy products (such as milk, cheese, and ice cream) can sometimes increase the amount of mucus your body makes  Ask if you should decrease your intake of dairy products  · Use a humidifier:  Use a cool mist humidifier to increase air moisture in your home  This may make it easier for you to breathe and help decrease your cough  Decrease your risk of acute bronchitis:   · Get the vaccinations you need:  Ask your healthcare provider if you should get vaccinated against the flu or pneumonia  · Avoid things that may irritate your lungs:  Stay inside or cover your mouth and nose with a scarf when you are outside during cold weather  You should also stay inside on days when air pollution levels are high  If you have allergies, stay inside when pollen counts are high  Avoid using aerosol products in your home  This includes spray-on deodorant, bug spray, and hair spray  · Avoid the spread of germs:        [facility] your hands often with soap and water  Carry germ-killing gel with you  You can use the gel to clean your hands when there is no soap and water available  ¨ Do not touch your eyes, nose, or mouth unless you have washed your hands first     ¨ Always cover your mouth when you cough  Cough into a tissue or your shirtsleeve so you do not spread germs from your hands  ¨ Try to avoid people who have a cold or the flu   If you are sick, stay away from others as much as possible  Follow up with your healthcare provider as directed:  Write down questions you have so you will remember to ask them during your follow-up visits  Contact your healthcare provider if:   · You have a fever  · Your skin becomes itchy or you have a rash after you take your medicine  · Your breathing problems do not go away or get worse  · Your cough does not get better with treatment  · You cough up blood  · You have questions or concerns about your condition or care  Seek care immediately or call 911 if:   · You faint  · Your lips or fingernails turn blue  · You feel like you are not getting enough air when you breathe  · You have swelling of your lips, tongue, or throat that makes it hard to breathe or swallow  © 2014 [address] is for End User's use only and may not be sold, redistributed or otherwise used for commercial purposes  All illustrations and images included in CareNotes® are the copyrighted property of A D A M [employer] Inc  or [employer]  The above information is an  only  It is not intended as medical advice for individual conditions or treatments  Talk to your doctor, nurse or pharmacist before following any medical regimen to see if it is safe and effective for you  How to Stop Smoking   WHAT YOU NEED TO KNOW:   You will improve your health and the health of others around you if you stop smoking  Your risk for heart and lung disease, cancer, stroke, heart attack, and vision problems will also decrease  You can benefit from quitting no matter how long you have smoked  DISCHARGE INSTRUCTIONS:   Prepare to stop smoking:  Nicotine is a highly addictive drug found in cigarettes  Withdrawal symptoms can happen when you stop smoking and make it hard to quit  These include anxiety, depression, irritability, trouble sleeping, and increased appetite   You increase your chances of success if you prepare to quit   · Set a quit date  [place] a date that is within the next 2 weeks  Do not pick a day that you think may be stressful or busy  Write down the day or [place] it on your calender  · Tell friends and family that you plan to quit  Explain that you may have withdrawal symptoms when you try to quit  Ask them to support you  They may be able to encourage you and help reduce your stress to make it easier for you to quit  · Make a list of your reasons for quitting  Put the list somewhere you will see it every day, such as your refrigerator  You can look at the list when you have a craving  · Remove all tobacco and nicotine products from your home, car, and workplace  Also, remove anything else that will tempt you to smoke, such as lighters, matches, or ashtrays  Clean your car, home, and places at work that smell like smoke  The smell of smoke can trigger a craving  · Identify triggers that make you want to smoke  This may include activities, feelings, or people  Also write down 1 way you can deal with each of your triggers  For example, if you want to smoke as soon as you wake up, plan another activity during this time, such as exercise  · Make a plan for how you will quit  Learn about the tools that can help you quit, such as medicine, counseling, or nicotine replacement therapy  Choose at least 2 options to help you quit  Tools to help you stop smoking:   · Counseling  from a trained healthcare provider can provide you with support and skills to quit smoking  The provider will also teach you to manage your withdrawal symptoms and cravings  You may receive counseling from one counselor, in group therapy, or through phone therapy called a quit line  · Nicotine replacement therapy (NRT)  such as nicotine patches, gum, or lozenges may help reduce your nicotine cravings  You may get these without a doctor's order   Do not use e-cigarettes or smokeless tobacco in place of cigarettes or to help you quit  They still contain nicotine  · Prescription medicines  such as nasal sprays or nicotine inhalers may help reduce your withdrawal symptoms  Other medicines may also be used to reduce your urge to smoke  Ask your healthcare provider about these medicines  You may need to start certain medicines 2 weeks before your quit date for them to work well  · Hypnosis  is a practice that helps guide you through thoughts and feelings  Hypnosis may help decrease your cravings and make you more willing to quit  · Acupuncture therapy  uses very thin needles to balance energy channels in the body  This is thought to help decrease cravings and symptoms of nicotine withdrawal      · Support groups  let you talk to others who are trying to quit or have already quit  It may be helpful to speak with others about how they quit  Manage your cravings:   · Avoid situations, people, and places that tempt you to smoke  Go to nonsmoking places, such as libraries or restaurants  Understand what tempts you and try to avoid these things  · Keep your hands busy  Hold things such as a stress ball or pen  · Put candy or toothpicks in your mouth  Keep lollipops, sugarless gum, or toothpicks with you at all times  · Do not have alcohol or caffeine  These drinks may tempt you to smoke  Drink healthy liquids such as water or juice instead  · Reward yourself when you resist your cravings  Rewards will motivate you and help you stay positive  · Do an activity that distracts you from your craving  Examples include going for a walk, exercising, or cleaning  Prevent weight gain after you quit:  You may gain a few pounds after you quit smoking  It is healthier for you to gain a few pounds than to continue to smoke  The following can help you prevent weight gain:  · Eat healthy foods  These include fruits, vegetables, whole-grain breads, low-fat dairy products, beans, lean meats, and fish   Eat healthy snacks, such as low-fat yogurt, if you get hungry between meals  · Drink water before, during, and between meals  This will make your stomach feel full and help prevent you from overeating  Ask your healthcare provider how much liquid to drink each day and which liquids are best for you  · Exercise  Take a walk or do some kind of exercise every day  Ask your healthcare provider what exercise is right for you  This may help reduce your cravings and reduce stress  For support and more information:   · Smokefree  [place]  Phone: 4- 090 - 262-9682  Web Address: www smokefree  gov  © 2017 [address] Information is for End User's use only and may not be sold, redistributed or otherwise used for commercial purposes  All illustrations and images included in CareNotes® are the copyrighted property of [employer]  or [address]  The above information is an  only  It is not intended as medical advice for individual conditions or treatments  Talk to your doctor, nurse or pharmacist before following any medical regimen to see if it is safe and effective for you

## 2018-02-27 NOTE — ED PROVIDER NOTES
History  Chief Complaint   Patient presents with    Cough     x 3 days, wants to be cleared for work     Patient presents the emergency department with upper respiratory symptoms for the past 4 days  Patient smokes half a pack a day but has not been able smoke the past couple of days because of her symptoms  Patient had fevers initially but it has resolved  Patient just is coughing at this time  She was told she could not return to work without a note  She feels much better and feels that she is well enough to return to work and is asking for a note  Discussed smoking cessation with patient  None       Past Medical History:   Diagnosis Date    Depression     History of positive PPD     Known health problems: none     Pelvic kidney     Pelvic kidney     left side       Past Surgical History:   Procedure Laterality Date    APPENDECTOMY         Family History   Problem Relation Age of Onset    Crohn's disease Brother     Crohn's disease Maternal Aunt     Diabetes Maternal Grandmother     Diabetes Maternal Grandfather     Diabetes Paternal Grandmother     Diabetes Paternal Grandfather      I have reviewed and agree with the history as documented  Social History   Substance Use Topics    Smoking status: Light Tobacco Smoker    Smokeless tobacco: Never Used    Alcohol use Yes        Review of Systems   All other systems reviewed and are negative  Physical Exam  ED Triage Vitals [02/26/18 1856]   Temperature Pulse Respirations Blood Pressure SpO2   99 1 °F (37 3 °C) 99 18 111/51 100 %      Temp Source Heart Rate Source Patient Position - Orthostatic VS BP Location FiO2 (%)   Oral -- -- -- --      Pain Score       No Pain           Orthostatic Vital Signs  Vitals:    02/26/18 1856   BP: 111/51   Pulse: 99       Physical Exam   Constitutional: She is oriented to person, place, and time  She appears well-developed and well-nourished  HENT:   Head: Normocephalic     Right Ear: External ear normal    Left Ear: External ear normal    Mouth/Throat: Oropharynx is clear and moist    Eyes: Conjunctivae and EOM are normal    Neck: Neck supple  Cardiovascular: Normal rate, regular rhythm and normal heart sounds  Pulmonary/Chest: Effort normal and breath sounds normal    Dry bronchitic cough   Abdominal: Soft  Bowel sounds are normal    Neurological: She is alert and oriented to person, place, and time  Skin: Skin is warm  Psychiatric: She has a normal mood and affect  Her behavior is normal    Nursing note and vitals reviewed  ED Medications  Medications - No data to display    Diagnostic Studies  Results Reviewed     None                 No orders to display              Procedures  Procedures       Phone Contacts  ED Phone Contact    ED Course  ED Course                                MDM  Number of Diagnoses or Management Options  Acute bronchitis: new and does not require workup  Patient Progress  Patient progress: improved    CritCare Time    Disposition  Final diagnoses:   Acute bronchitis     Time reflects when diagnosis was documented in both MDM as applicable and the Disposition within this note     Time User Action Codes Description Comment    2/26/2018  7:40 PM Kalyn Cast [J20 [id number]] Acute bronchitis       ED Disposition     ED Disposition Condition Comment    Discharge  [place] discharge to home/self care      Condition at discharge: Good        Follow-up Information     [facility] Details [facility]   [address]  [phone]          Patient's Medications   Discharge Prescriptions    AZITHROMYCIN (ZITHROMAX) 250 MG TABLET    2 PO QD X 1 day then 1 PO QD       Start Date: 2/26/2018 End Date: 3/3/2018       Order Dose: --       Quantity: 6 tablet    Refills: 0    BENZONATATE (TESSALON PERLES) 100 MG CAPSULE    Take 1 capsule (100 mg total) by mouth 3 (three) times a day as needed for cough for up to 7 days       Start Date: 2/26/2018 End Date: 3/5/2018       Order Dose: 100 mg       Quantity: 20 capsule    Refills: 0     No discharge procedures on file      ED Provider  Electronically Signed by           Jing Castellano PA-C  02/26/18 1940

## 2018-03-21 ENCOUNTER — APPOINTMENT (EMERGENCY)
Dept: RADIOLOGY | Facility: HOSPITAL | Age: 28
End: 2018-03-21
Payer: COMMERCIAL

## 2018-03-21 ENCOUNTER — APPOINTMENT (EMERGENCY)
Dept: CT IMAGING | Facility: HOSPITAL | Age: 28
End: 2018-03-21
Payer: COMMERCIAL

## 2018-03-21 ENCOUNTER — HOSPITAL ENCOUNTER (EMERGENCY)
Facility: HOSPITAL | Age: 28
Discharge: HOME | End: 2018-03-21
Attending: EMERGENCY MEDICINE | Admitting: EMERGENCY MEDICINE
Payer: COMMERCIAL

## 2018-03-21 VITALS
SYSTOLIC BLOOD PRESSURE: 101 MMHG | WEIGHT: 119.93 LBS | OXYGEN SATURATION: 99 % | BODY MASS INDEX: 19.36 KG/M2 | HEART RATE: 82 BPM | TEMPERATURE: 98.3 F | RESPIRATION RATE: 18 BRPM | DIASTOLIC BLOOD PRESSURE: 51 MMHG

## 2018-03-21 DIAGNOSIS — S29.011A MUSCLE STRAIN OF CHEST WALL, INITIAL ENCOUNTER: Primary | ICD-10-CM

## 2018-03-21 LAB
ALBUMIN SERPL BCP-MCNC: 3.9 G/DL (ref 3.5–5)
ALP SERPL-CCNC: 80 U/L (ref 46–116)
ALT SERPL W P-5'-P-CCNC: 41 U/L (ref 12–78)
ANION GAP SERPL CALCULATED.3IONS-SCNC: 13 MMOL/L (ref 4–13)
APTT PPP: 28 SECONDS (ref 23–35)
AST SERPL W P-5'-P-CCNC: 37 U/L (ref 5–45)
BACTERIA UR QL AUTO: ABNORMAL /HPF
BASOPHILS # BLD AUTO: 0.04 THOUSANDS/ΜL (ref 0–0.1)
BASOPHILS NFR BLD AUTO: 0 % (ref 0–1)
BILIRUB SERPL-MCNC: 0.22 MG/DL (ref 0.2–1)
BILIRUB UR QL STRIP: NEGATIVE
BUN SERPL-MCNC: 18 MG/DL (ref 5–25)
CALCIUM SERPL-MCNC: 9.1 MG/DL (ref 8.3–10.1)
CHLORIDE SERPL-SCNC: 104 MMOL/L (ref 100–108)
CLARITY UR: CLEAR
CLARITY, POC: CLEAR
CO2 SERPL-SCNC: 22 MMOL/L (ref 21–32)
COLOR UR: YELLOW
COLOR, POC: YELLOW
CREAT SERPL-MCNC: 0.69 MG/DL (ref 0.6–1.3)
DEPRECATED D DIMER PPP: 429 NG/ML (FEU) (ref 0–424)
EOSINOPHIL # BLD AUTO: 0.17 THOUSAND/ΜL (ref 0–0.61)
EOSINOPHIL NFR BLD AUTO: 2 % (ref 0–6)
ERYTHROCYTE [DISTWIDTH] IN BLOOD BY AUTOMATED COUNT: 17.3 % (ref 11.6–15.1)
EXT PREG TEST URINE: NEGATIVE
GFR SERPL CREATININE-BSD FRML MDRD: 119 ML/MIN/1.73SQ M
GLUCOSE SERPL-MCNC: 110 MG/DL (ref 65–140)
GLUCOSE UR STRIP-MCNC: NEGATIVE MG/DL
HCT VFR BLD AUTO: 37 % (ref 34.8–46.1)
HGB BLD-MCNC: 12.4 G/DL (ref 11.5–15.4)
HGB UR QL STRIP.AUTO: NEGATIVE
INR PPP: 0.97 (ref 0.86–1.16)
KETONES UR STRIP-MCNC: NEGATIVE MG/DL
LEUKOCYTE ESTERASE UR QL STRIP: ABNORMAL
LYMPHOCYTES # BLD AUTO: 2.85 THOUSANDS/ΜL (ref 0.6–4.47)
LYMPHOCYTES NFR BLD AUTO: 32 % (ref 14–44)
MCH RBC QN AUTO: 27 PG (ref 26.8–34.3)
MCHC RBC AUTO-ENTMCNC: 33.5 G/DL (ref 31.4–37.4)
MCV RBC AUTO: 80 FL (ref 82–98)
MONOCYTES # BLD AUTO: 0.77 THOUSAND/ΜL (ref 0.17–1.22)
MONOCYTES NFR BLD AUTO: 9 % (ref 4–12)
NEUTROPHILS # BLD AUTO: 5.14 THOUSANDS/ΜL (ref 1.85–7.62)
NEUTS SEG NFR BLD AUTO: 57 % (ref 43–75)
NITRITE UR QL STRIP: NEGATIVE
NON-SQ EPI CELLS URNS QL MICRO: ABNORMAL /HPF
NRBC BLD AUTO-RTO: 0 /100 WBCS
PH UR STRIP.AUTO: 7.5 [PH] (ref 4.5–8)
PLATELET # BLD AUTO: 184 THOUSANDS/UL (ref 149–390)
PMV BLD AUTO: 11.1 FL (ref 8.9–12.7)
POTASSIUM SERPL-SCNC: 3.6 MMOL/L (ref 3.5–5.3)
PROT SERPL-MCNC: 7 G/DL (ref 6.4–8.2)
PROT UR STRIP-MCNC: NEGATIVE MG/DL
PROTHROMBIN TIME: 12.9 SECONDS (ref 12.1–14.4)
RBC # BLD AUTO: 4.6 MILLION/UL (ref 3.81–5.12)
RBC #/AREA URNS AUTO: ABNORMAL /HPF
SODIUM SERPL-SCNC: 139 MMOL/L (ref 136–145)
SP GR UR STRIP.AUTO: 1.02 (ref 1–1.03)
TROPONIN I SERPL-MCNC: <0.02 NG/ML
UROBILINOGEN UR QL STRIP.AUTO: 0.2 E.U./DL
WBC # BLD AUTO: 8.97 THOUSAND/UL (ref 4.31–10.16)
WBC #/AREA URNS AUTO: ABNORMAL /HPF

## 2018-03-21 PROCEDURE — 71046 X-RAY EXAM CHEST 2 VIEWS: CPT

## 2018-03-21 PROCEDURE — 85610 PROTHROMBIN TIME: CPT | Performed by: EMERGENCY MEDICINE

## 2018-03-21 PROCEDURE — 81002 URINALYSIS NONAUTO W/O SCOPE: CPT | Performed by: EMERGENCY MEDICINE

## 2018-03-21 PROCEDURE — 85730 THROMBOPLASTIN TIME PARTIAL: CPT | Performed by: EMERGENCY MEDICINE

## 2018-03-21 PROCEDURE — 36415 COLL VENOUS BLD VENIPUNCTURE: CPT | Performed by: EMERGENCY MEDICINE

## 2018-03-21 PROCEDURE — 93005 ELECTROCARDIOGRAM TRACING: CPT

## 2018-03-21 PROCEDURE — 84484 ASSAY OF TROPONIN QUANT: CPT | Performed by: EMERGENCY MEDICINE

## 2018-03-21 PROCEDURE — 96374 THER/PROPH/DIAG INJ IV PUSH: CPT

## 2018-03-21 PROCEDURE — 80053 COMPREHEN METABOLIC PANEL: CPT | Performed by: EMERGENCY MEDICINE

## 2018-03-21 PROCEDURE — 71275 CT ANGIOGRAPHY CHEST: CPT

## 2018-03-21 PROCEDURE — 99285 EMERGENCY DEPT VISIT HI MDM: CPT

## 2018-03-21 PROCEDURE — 81001 URINALYSIS AUTO W/SCOPE: CPT

## 2018-03-21 PROCEDURE — 85379 FIBRIN DEGRADATION QUANT: CPT | Performed by: EMERGENCY MEDICINE

## 2018-03-21 PROCEDURE — 81025 URINE PREGNANCY TEST: CPT | Performed by: EMERGENCY MEDICINE

## 2018-03-21 PROCEDURE — 85025 COMPLETE CBC W/AUTO DIFF WBC: CPT | Performed by: EMERGENCY MEDICINE

## 2018-03-21 RX ORDER — NAPROXEN 500 MG/1
500 TABLET ORAL 2 TIMES DAILY WITH MEALS
Qty: 10 TABLET | Refills: 0 | Status: SHIPPED | OUTPATIENT
Start: 2018-03-21 | End: 2018-07-10

## 2018-03-21 RX ORDER — KETOROLAC TROMETHAMINE 30 MG/ML
15 INJECTION, SOLUTION INTRAMUSCULAR; INTRAVENOUS ONCE
Status: COMPLETED | OUTPATIENT
Start: 2018-03-21 | End: 2018-03-21

## 2018-03-21 RX ADMIN — IOHEXOL 65 ML: 350 INJECTION, SOLUTION INTRAVENOUS at 01:41

## 2018-03-21 RX ADMIN — KETOROLAC TROMETHAMINE 15 MG: 30 INJECTION, SOLUTION INTRAMUSCULAR at 00:51

## 2018-03-21 NOTE — ED NOTES
Pt assisted to bathroom with use of wheelchair secondary to pain       Jyothi Encarnacion RN  03/21/18 3625

## 2018-03-21 NOTE — ED PROVIDER NOTES
History  Chief Complaint   Patient presents with    Pain With Breathing     Pt reports L sided CP radiating to back, worsening since yesterday, states pain worse with movement and deep breathing, similar episode in the past and diagnosed with "seasonal cough"  History provided by:  Patient   used: No    Chest Pain   Pain location:  L chest  Pain quality: aching    Pain radiates to:  Does not radiate  Pain radiates to the back: no    Pain severity:  Moderate  Onset quality:  Gradual  Duration:  2 days  Timing:  Constant  Progression:  Unchanged  Chronicity:  New  Context: breathing, movement and at rest    Relieved by:  Nothing  Worsened by:  Nothing tried  Ineffective treatments:  None tried  Associated symptoms: palpitations and shortness of breath    Associated symptoms: no abdominal pain, no altered mental status, no back pain, no cough, no dizziness, no dysphagia, no fever, no headache, no lower extremity edema, no nausea and not vomiting    Risk factors: smoking    Risk factors: no immobilization and no prior DVT/PE        None       Past Medical History:   Diagnosis Date    Depression     History of positive PPD     Known health problems: none     Pelvic kidney     Pelvic kidney     left side       Past Surgical History:   Procedure Laterality Date    APPENDECTOMY         Family History   Problem Relation Age of Onset    Crohn's disease Brother     Crohn's disease Maternal Aunt     Diabetes Maternal Grandmother     Diabetes Maternal Grandfather     Diabetes Paternal Grandmother     Diabetes Paternal Grandfather      I have reviewed and agree with the history as documented  Social History   Substance Use Topics    Smoking status: Heavy Tobacco Smoker     Packs/day: 0 50     Types: Cigarettes    Smokeless tobacco: Never Used    Alcohol use Yes        Review of Systems   Constitutional: Negative for activity change, chills and fever     HENT: Negative for facial swelling, sore throat and trouble swallowing  Eyes: Negative for pain and visual disturbance  Respiratory: Positive for shortness of breath  Negative for cough and chest tightness  Cardiovascular: Positive for chest pain and palpitations  Negative for leg swelling  Gastrointestinal: Negative for abdominal pain, blood in stool, diarrhea, nausea and vomiting  Genitourinary: Negative for dysuria and flank pain  Musculoskeletal: Negative for back pain, neck pain and neck stiffness  Skin: Negative for pallor and rash  Allergic/Immunologic: Negative for environmental allergies and immunocompromised state  Neurological: Negative for dizziness and headaches  Hematological: Negative for adenopathy  Does not bruise/bleed easily  Psychiatric/Behavioral: Negative for agitation and behavioral problems  All other systems reviewed and are negative  Physical Exam  ED Triage Vitals [03/21/18 0020]   Temperature Pulse Respirations Blood Pressure SpO2   98 3 °F (36 8 °C) 89 18 109/58 99 %      Temp Source Heart Rate Source Patient Position - Orthostatic VS BP Location FiO2 (%)   Oral Monitor Lying Right arm --      Pain Score       8           Orthostatic Vital Signs  Vitals:    03/21/18 0020 03/21/18 0217   BP: 109/58 101/51   Pulse: 89 82   Patient Position - Orthostatic VS: Lying Lying       Physical Exam   Constitutional: She is oriented to person, place, and time  She appears well-developed and well-nourished  No distress  HENT:   Head: Normocephalic and atraumatic  Eyes: EOM are normal    Neck: Normal range of motion  Neck supple  Cardiovascular: Normal rate, regular rhythm, normal heart sounds and intact distal pulses  Pulmonary/Chest: Effort normal and breath sounds normal    Abdominal: Soft  Bowel sounds are normal  There is no tenderness  There is no rebound and no guarding  Musculoskeletal: Normal range of motion  Neurological: She is alert and oriented to person, place, and time  Skin: Skin is warm and dry  Psychiatric: She has a normal mood and affect  Nursing note and vitals reviewed  ED Medications  Medications   ketorolac (TORADOL) injection 15 mg (15 mg Intravenous Given 3/21/18 0051)   iohexol (OMNIPAQUE) 350 MG/ML injection (SINGLE-DOSE) 65 mL (65 mL Intravenous Given 3/21/18 0141)       Diagnostic Studies  Results Reviewed     Procedure Component Value Units Date/Time    Troponin I [[id number]]  (Normal) Collected:  03/21/18 0051    Lab Status:  Final result Specimen:  Blood from Arm, Left Updated:  03/21/18 0119     Troponin I <0 02 ng/mL     Narrative:         [employer] Chemistry analyzer 99% cutoff is > 0 04 ng/mL in network labs    o cTnI 99% cutoff is useful only when applied to patients in the clinical setting of myocardial ischemia  o cTnI 99% cutoff should be interpreted in the context of clinical history, ECG findings and possibly cardiac imaging to establish correct diagnosis  o cTnI 99% cutoff may be suggestive but clearly not indicative of a coronary event without the clinical setting of myocardial ischemia  Comprehensive metabolic panel [[id number]] Collected:  03/21/18 0051    Lab Status:  Final result Specimen:  Blood from Arm, Left Updated:  03/21/18 0111     Sodium 139 mmol/L      Potassium 3 6 mmol/L      Chloride 104 mmol/L      CO2 22 mmol/L      Anion Gap 13 mmol/L      BUN 18 mg/dL      Creatinine 0 69 mg/dL      Glucose 110 mg/dL      Calcium 9 1 mg/dL      AST 37 U/L      ALT 41 U/L      Alkaline Phosphatase 80 U/L      Total Protein 7 0 g/dL      Albumin 3 9 g/dL      Total Bilirubin 0 22 mg/dL      eGFR 119 ml/min/1 73sq m     Narrative:         National Kidney Disease Education Program recommendations are as follows:  GFR calculation is accurate only with a steady state creatinine  Chronic Kidney disease less than 60 ml/min/1 73 sq  meters  Kidney failure less than 15 ml/min/1 73 sq  meters      D-Dimer [[id number]]  (Abnormal) Collected:  03/21/18 4146    Lab Status:  Final result Specimen:  Blood from Arm, Left Updated:  03/21/18 0109     D-Dimer, Quant 429 (H) ng/ml (FEU)     Protime-INR [[id number]]  (Normal) Collected:  03/21/18 0051    Lab Status:  Final result Specimen:  Blood from Arm, Left Updated:  03/21/18 0106     Protime 12 9 seconds      INR 0 97    APTT [[id number]]  (Normal) Collected:  03/21/18 0051    Lab Status:  Final result Specimen:  Blood from Arm, Left Updated:  03/21/18 0106     PTT 28 seconds     Narrative:          Therapeutic Heparin Range = 60-90 seconds    CBC and differential [[id number]]  (Abnormal) Collected:  03/21/18 0051    Lab Status:  Final result Specimen:  Blood from Arm, Left Updated:  03/21/18 0058     WBC 8 97 Thousand/uL      RBC 4 60 Million/uL      Hemoglobin 12 4 g/dL      Hematocrit 37 0 %      MCV 80 (L) fL      MCH 27 0 pg      MCHC 33 5 g/dL      RDW 17 3 (H) %      MPV 11 1 fL      Platelets 045 Thousands/uL      nRBC 0 /100 WBCs      Neutrophils Relative 57 %      Lymphocytes Relative 32 %      Monocytes Relative 9 %      Eosinophils Relative 2 %      Basophils Relative 0 %      Neutrophils Absolute 5 14 Thousands/µL      Lymphocytes Absolute 2 85 Thousands/µL      Monocytes Absolute 0 77 Thousand/µL      Eosinophils Absolute 0 17 Thousand/µL      Basophils Absolute 0 04 Thousands/µL     Urine Microscopic [[id number]]  (Abnormal) Collected:  03/21/18 0040    Lab Status:  Final result Specimen:  Urine from Urine, Clean Catch Updated:  03/21/18 0054     RBC, UA 0-1 (A) /hpf      WBC, UA 2-4 (A) /hpf      Epithelial Cells Moderate (A) /hpf      Bacteria, UA Occasional /hpf     POCT urinalysis dipstick [[id number]]  (Normal) Resulted:  03/21/18 0044    Lab Status:  Final result Specimen:  Urine Updated:  03/21/18 0044     Color, UA yellow     Clarity, UA clear    POCT pregnancy, urine [[id number]]  (Normal) Resulted:  03/21/18 0043    Lab Status:  Final result Updated:  03/21/18 0044     EXT PREG TEST UR (Ref: Negative) negative    ED Urine Macroscopic [[id number]]  (Abnormal) Collected:  03/21/18 0040    Lab Status:  Final result Specimen:  Urine Updated:  03/21/18 0041     Color, UA Yellow     Clarity, UA Clear     pH, UA 7 5     Leukocytes, UA Small (A)     Nitrite, UA Negative     Protein, UA Negative mg/dl      Glucose, UA Negative mg/dl      Ketones, UA Negative mg/dl      Urobilinogen, UA 0 2 E U /dl      Bilirubin, UA Negative     Blood, UA Negative     Specific Gravity, UA 1 020    Narrative:       CLINITEK RESULT                 CTA ED chest PE study   Final Result by Xiang Lawler MD (03/21 0158)      No evidence of pulmonary embolism  Workstation performed: [id number]         XR chest 2 views   ED Interpretation by Rupali Hays MD (03/21 0102)   No acute abnormality noted  Final Result by Xiang Lawler MD (03/21 0158)      No focal consolidation  Workstation performed: [id number]                    Procedures  ECG 12 Lead Documentation  Date/Time: 3/21/2018 12:35 AM  Performed by: Anabelle Escobar  Authorized by: Anabelle Escobar     Indications / Diagnosis:  Chest pain  ECG reviewed by me, the ED Provider: yes    Patient location:  ED  Interpretation:     Interpretation: normal    Rate:     ECG rate:  87    ECG rate assessment: normal    Rhythm:     Rhythm: sinus rhythm    Ectopy:     Ectopy: none    QRS:     QRS axis:  Normal  Conduction:     Conduction: normal    ST segments:     ST segments:  Normal  T waves:     T waves: normal             Phone Contacts  ED Phone Contact    ED Course  ED Course as of Mar 21 0439   Wed Mar 21, 2018   0110 Positive D-dimer noted  We will get CT PE scan  D-DIMER QUANTITATIVE: (!) 429   0204 Labs including CBC, CMP, Trop _ wnl  CT negative for PE  Probable chest wall strain  We will discharge on Naproxen                                  MDM  Number of Diagnoses or Management Options  Muscle strain of chest wall, initial encounter: new and requires workup  Diagnosis management comments: Patient is a 27-year-old female, comes in with complaint of left-sided chest pain, started yesterday denies injuries, falls, does work as house keeper, denies cough, phlegm, fever, does smoke cigarettes; pain is described aching, positional, worse with movement and breathing  On exam patient appears in mild-to-moderate distress due to pain hurts to sit up in the bed; vitals stable, lungs bilateral equal air entry, no chest rash; abdomen soft nontender, extremities no swelling, no calf tenderness, skin no rash  Differential diagnosis:  Chest wall strain, pneumonia, pneumothorax, PE, atypical chest pain, pericarditis, myocarditis  Will check labs including CBC, CMP, troponin, EKG, chest x-ray, D-dimer, rule out pregnancy, give Toradol IV  Amount and/or Complexity of Data Reviewed  Clinical lab tests: reviewed and ordered  Tests in the radiology section of CPT®: ordered and reviewed  Tests in the medicine section of CPT®: ordered and reviewed  Independent visualization of images, tracings, or specimens: yes      CritCare Time    Disposition  Final diagnoses:   Muscle strain of chest wall, initial encounter     Time reflects when diagnosis was documented in both MDM as applicable and the Disposition within this note     Time User Action Codes Description Comment    3/21/2018  2:07 AM Camilla Florian Add [[id number]] Muscle strain of chest wall, initial encounter       ED Disposition     ED Disposition Condition Comment    Discharge  Valerie Parsons discharge to home/self care      Condition at discharge: Stable        Follow-up Information     Follow up With Specialties Details Why [address], DO Internal Medicine   [address]  [phone]          Discharge Medication List as of 3/21/2018  2:07 AM      START taking these medications    Details   naproxen (NAPROSYN) 500 mg tablet Take 1 tablet (500 mg total) by mouth 2 (two) times a day with meals for 5 days, Starting Wed 3/21/2018, Until Mon 3/26/2018, Print           No discharge procedures on file      ED Provider  Electronically Signed by           Chantell Capps MD  03/21/18 0070

## 2018-03-21 NOTE — DISCHARGE INSTRUCTIONS
Muscle Strain   WHAT YOU NEED TO KNOW:   A muscle strain is a twist, pull, or tear of a muscle or tendon  A tendon is a strong elastic tissue that connects a muscle to a bone  Signs of a strained muscle include bruising and swelling over the area, pain with movement, and loss of strength  DISCHARGE INSTRUCTIONS:   Return to the emergency department if:   · You suddenly cannot feel or move your injured muscle  Contact your healthcare provider if:   · Your pain and swelling worsen or do not go away  · You have questions or concerns about your condition or care  Medicines:   · NSAIDs  help decrease swelling and pain or fever  This medicine is available with or without a doctor's order  NSAIDs can cause stomach bleeding or kidney problems in certain people  If you take blood thinner medicine, always ask your healthcare provider if NSAIDs are safe for you  Always read the medicine label and follow directions  · Muscle relaxers  help decrease pain and muscle spasms  · Take your medicine as directed  Contact your healthcare provider if you think your medicine is not helping or if you have side effects  Tell him of her if you are allergic to any medicine  Keep a list of the medicines, vitamins, and herbs you take  Include the amounts, and when and why you take them  Bring the list or the pill bottles to follow-up visits  Carry your medicine list with you in case of an emergency  Follow up with your healthcare provider as directed: Your healthcare provider may suggest that you have a follow-up visit before you go back to your usual activity  Write down your questions so you remember to ask them during your visits  Self-care:   · 3 to 7 days after the injury:  Use Rest, Ice, Compression, and Elevation (RICE) to help stop bruising and decrease pain and swelling  ¨ Rest:  Rest your muscle to allow your injury to heal  When the pain decreases, begin normal, slow movements   For mild and moderate muscle strains, you should rest your muscles for about 2 days  However, if you have a severe muscle strain, you should rest for 10 to 14 days  You may need to use crutches to walk if your muscle strain is in your legs or lower body  ¨ Ice:  Put an ice pack on the injured area  Put a towel between the ice pack and your skin  Do not put the ice pack directly on your skin  You can use a package of frozen peas instead of an ice pack  ¨ Compression:  You may need to wrap an elastic bandage around the area to decrease swelling  It should be tight enough for you to feel support  Do not wrap it too tightly  ¨ Elevation:  Keep the injured muscle raised above your heart if possible  For example if you have a strain of your lower leg muscle, lie down and prop your leg up on pillows  This helps decrease pain and swelling  · 3 to 21 days after the injury:  Start to slowly and regularly exercise your muscle  This will help it heal  If you feel pain, decrease how hard you are exercising  · 1 to 6 weeks after the injury:  Stretch the injured muscle  Hold the stretch for about 30 seconds  Do this 4 times a day  You may stretch the muscle until you feel a slight pull  Stop stretching if you feel pain  · 2 weeks to 6 months after the injury:  The goal of this phase is to return to the activity you were doing before the injury happened, without hurting the muscle again  · 3 weeks to 6 months after the injury:  Keep stretching and strengthening your muscles to avoid injury  Slowly increase the time and distance that you exercise  You may have signs and symptoms of muscle strain 6 months after the injury, even if you do things to help it heal  In this case, you may need surgery on the muscle  © 2017 [employer] Information is for End User's use only and may not be sold, redistributed or otherwise used for commercial purposes   All illustrations and images included in CareNotes® are the copyrighted property of [employer]  or Kit Camacho  The above information is an  only  It is not intended as medical advice for individual conditions or treatments  Talk to your doctor, nurse or pharmacist before following any medical regimen to see if it is safe and effective for you

## 2018-03-22 LAB
ATRIAL RATE: 87 BPM
P AXIS: 71 DEGREES
PR INTERVAL: 136 MS
QRS AXIS: 88 DEGREES
QRSD INTERVAL: 88 MS
QT INTERVAL: 362 MS
QTC INTERVAL: 435 MS
T WAVE AXIS: 66 DEGREES
VENTRICULAR RATE: 87 BPM

## 2018-03-22 PROCEDURE — 93010 ELECTROCARDIOGRAM REPORT: CPT | Performed by: INTERNAL MEDICINE

## 2018-07-10 ENCOUNTER — HOSPITAL ENCOUNTER (EMERGENCY)
Facility: HOSPITAL | Age: 28
Discharge: HOME | End: 2018-07-10

## 2018-07-10 VITALS
SYSTOLIC BLOOD PRESSURE: 120 MMHG | HEART RATE: 105 BPM | OXYGEN SATURATION: 99 % | TEMPERATURE: 98.8 F | DIASTOLIC BLOOD PRESSURE: 58 MMHG | RESPIRATION RATE: 18 BRPM

## 2018-07-10 DIAGNOSIS — M54.12 CERVICAL RADICULOPATHY: Primary | ICD-10-CM

## 2018-07-10 PROCEDURE — 99283 EMERGENCY DEPT VISIT LOW MDM: CPT

## 2018-07-10 RX ORDER — CYCLOBENZAPRINE HCL 5 MG
5 TABLET ORAL 3 TIMES DAILY PRN
Qty: 10 TABLET | Refills: 0 | OUTPATIENT
Start: 2018-07-10 | End: 2019-04-19

## 2018-07-10 RX ORDER — NAPROXEN 500 MG/1
500 TABLET ORAL 2 TIMES DAILY WITH MEALS
Qty: 10 TABLET | Refills: 0 | Status: SHIPPED | OUTPATIENT
Start: 2018-07-10 | End: 2018-07-15

## 2018-07-11 NOTE — DISCHARGE INSTRUCTIONS
Cervical Radiculopathy   WHAT YOU NEED TO KNOW:   Cervical radiculopathy is a painful condition that happens when a spinal nerve in your neck is pinched or irritated  DISCHARGE INSTRUCTIONS:   Medicines: You may need any of the following:  · NSAIDs  help decrease swelling and pain  This medicine can be bought without a doctor's order  This medicine can cause stomach bleeding or kidney problems in certain people  If you take blood thinner medicine, always ask your healthcare provider if NSAIDs are safe for you  Always read the medicine label and follow the directions on it before using this medicine  · Prescription pain medicine  helps decrease pain  Do not wait until the pain is severe before you take this medicine  · Steroids  help decrease pain and swelling  These may be given as a pill or as an injection in your neck  You may need more than 1 injection if your symptoms do not improve after the first treatment  · Take your medicine as directed  Contact your healthcare provider if you think your medicine is not helping or if you have side effects  Tell him of her if you are allergic to any medicine  Keep a list of the medicines, vitamins, and herbs you take  Include the amounts, and when and why you take them  Bring the list or the pill bottles to follow-up visits  Carry your medicine list with you in case of an emergency  Follow up with your healthcare provider or spine specialist as directed:  Write down your questions so you remember to ask them during your visits  Physical therapy:  Your healthcare provider may suggest physical therapy to stretch and strengthen your muscles  Your physical therapist can teach you how to improve your posture and the way you hold your neck  He may also teach you how to be safely active and avoid further injury  He can also help you develop an exercise program that is safe for your back and neck  Self-care:   · Ice  helps decrease swelling and pain   Ice may also help prevent tissue damage  Use an ice pack, or put crushed ice in a plastic bag  Cover it with a towel and place it on your neck for 15 to 20 minutes every hour or as directed  · Rest  when you feel it is needed  Slowly start to do more each day  Return to your daily activities as directed  · Wear a soft collar  You may be given a soft collar to support your neck while you sleep  Wear the soft collar only as directed  · Do light stretches and regular exercise  Your healthcare provider may suggest light stretches to help decrease stiffness in your neck and arm as you recover  After your pain is controlled, you may benefit from regular exercise  Ask what type of exercise is safe for your back and neck  · Review your work area  A comfortable work area can help prevent neck strain  Ask your employer for an ergonomic review to check the position of your desk, chair, phone, and computer  Make any necessary adjustments for your comfort  Contact your healthcare provider or spine specialist if:   · You have a fever  · You are losing weight without trying  · Your pain is worse, even with medicine  · One or both hands feel more numb than before, or you cannot move your fingers well  · You have questions or concerns about your condition or care  © 2017 [employer]  Information is for End User's use only and may not be sold, redistributed or otherwise used for commercial purposes  All illustrations and images included in CareNotes® are the copyrighted property of [employer]  or [employer]acho  The above information is an  only  It is not intended as medical advice for individual conditions or treatments  Talk to your doctor, nurse or pharmacist before following any medical regimen to see if it is safe and effective for you

## 2018-07-11 NOTE — ED PROVIDER NOTES
History  Chief Complaint   Patient presents with    Arm Pain     Pt reports "pain from shoulder down arm  been going on for 3 weeks"     29year old female presents today complaining of left posterior shoulder pain that radiates down her left arm  The pain has been ongoing over the last 3 weeks  Painful to raise arm overhead and with any range of motion  Admits to neck pain when she turns her head  Denies history of same  No trauma or recent injuries  No history of shoulder injury  Has tried taking motrin  None       Past Medical History:   Diagnosis Date    Depression     History of positive PPD     Known health problems: none     Pelvic kidney     Pelvic kidney     left side       Past Surgical History:   Procedure Laterality Date    APPENDECTOMY         Family History   Problem Relation Age of Onset    Crohn's disease Brother     Crohn's disease Maternal Aunt     Diabetes Maternal Grandmother     Diabetes Maternal Grandfather     Diabetes Paternal Grandmother     Diabetes Paternal Grandfather      I have reviewed and agree with the history as documented  Social History   Substance Use Topics    Smoking status: Heavy Tobacco Smoker     Packs/day: 0 50     Types: Cigarettes    Smokeless tobacco: Never Used    Alcohol use Yes      Comment: social        Review of Systems   Respiratory: Positive for shortness of breath (Pt is a smoker and says she always has SOB)  Musculoskeletal: Positive for arthralgias  All other systems reviewed and are negative  Physical Exam  Physical Exam   Constitutional: She is oriented to person, place, and time  She appears well-developed and well-nourished  No distress  HENT:   Head: Normocephalic and atraumatic  Eyes: Conjunctivae are normal    Neck: Muscular tenderness present  No spinous process tenderness present  Normal range of motion present  Cardiovascular: Normal rate, regular rhythm and normal heart sounds      Pulmonary/Chest: Effort normal and breath sounds normal  No respiratory distress  She has no wheezes  Abdominal: Soft  She exhibits no distension  There is no tenderness  Musculoskeletal:        Left shoulder: She exhibits decreased range of motion (due to pain, full passive ROM)  She exhibits no tenderness, no bony tenderness, no swelling, no effusion, no crepitus, no deformity, no laceration, normal pulse and normal strength  Neurological: She is alert and oriented to person, place, and time  No cranial nerve deficit or sensory deficit  Skin: Skin is warm and dry  Capillary refill takes less than 2 seconds  She is not diaphoretic  Psychiatric: She has a normal mood and affect  Her behavior is normal        Vital Signs  ED Triage Vitals [07/10/18 2143]   Temperature Pulse Respirations Blood Pressure SpO2   98 8 °F (37 1 °C) 105 18 120/58 99 %      Temp src Heart Rate Source Patient Position - Orthostatic VS BP Location FiO2 (%)   -- -- -- -- --      Pain Score       3           Vitals:    07/10/18 2143   BP: 120/58   Pulse: 105       Visual Acuity      ED Medications  Medications - No data to display    Diagnostic Studies  Results Reviewed     None                 No orders to display              Procedures  Procedures       Phone Contacts  ED Phone Contact    ED Course                               MDM  CritCare Time    Disposition  Final diagnoses:   Cervical radiculopathy     Time reflects when diagnosis was documented in both MDM as applicable and the Disposition within this note     Time User Action Codes Description Comment    7/10/2018 10:21 PM Nicole Santana Add [M54 12] Cervical radiculopathy       ED Disposition     ED Disposition Condition Comment    Discharge  Matt Frias discharge to home/self care      Condition at discharge: Good        Follow-up Information     Follow up With Specialties Details Why [facility] Schedule an appointment as soon as possible for a visit  [address] [place] [address]  [id number] [phone]          Discharge Medication List as of 7/10/2018 10:22 PM      START taking these medications    Details   cyclobenzaprine (FLEXERIL) 5 mg tablet Take 1 tablet (5 mg total) by mouth 3 (three) times a day as needed for muscle spasms, Starting Tue 7/10/2018, Print      naproxen (NAPROSYN) 500 mg tablet Take 1 tablet (500 mg total) by mouth 2 (two) times a day with meals for 5 days, Starting Tue 7/10/2018, Until Sun 7/15/2018, Print           No discharge procedures on file      ED Provider  Electronically Signed by           Ian Louis PA-C  07/10/18 1056

## 2019-04-19 ENCOUNTER — APPOINTMENT (EMERGENCY)
Dept: RADIOLOGY | Facility: HOSPITAL | Age: 29
End: 2019-04-19

## 2019-04-19 ENCOUNTER — HOSPITAL ENCOUNTER (EMERGENCY)
Facility: HOSPITAL | Age: 29
Discharge: HOME | End: 2019-04-19
Attending: EMERGENCY MEDICINE | Admitting: EMERGENCY MEDICINE

## 2019-04-19 VITALS
TEMPERATURE: 98 F | OXYGEN SATURATION: 97 % | RESPIRATION RATE: 16 BRPM | DIASTOLIC BLOOD PRESSURE: 53 MMHG | WEIGHT: 123.46 LBS | BODY MASS INDEX: 19.93 KG/M2 | HEART RATE: 68 BPM | SYSTOLIC BLOOD PRESSURE: 106 MMHG

## 2019-04-19 DIAGNOSIS — S80.02XA CONTUSION OF LEFT KNEE, INITIAL ENCOUNTER: ICD-10-CM
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
## 2019-04-19 DIAGNOSIS — T14.8XXA ABRASION: ICD-10-CM

## 2019-04-19 DIAGNOSIS — S20.211A CONTUSION OF RIGHT CHEST WALL, INITIAL ENCOUNTER: ICD-10-CM

## 2019-04-19 PROCEDURE — 99284 EMERGENCY DEPT VISIT MOD MDM: CPT | Performed by: EMERGENCY MEDICINE

## 2019-04-19 PROCEDURE — 71046 X-RAY EXAM CHEST 2 VIEWS: CPT

## 2019-04-19 PROCEDURE — 99284 EMERGENCY DEPT VISIT MOD MDM: CPT

## 2019-04-19 PROCEDURE — 90715 TDAP VACCINE 7 YRS/> IM: CPT | Performed by: EMERGENCY MEDICINE

## 2019-04-19 PROCEDURE — 90471 IMMUNIZATION ADMIN: CPT

## 2019-04-19 PROCEDURE — 96372 THER/PROPH/DIAG INJ SC/IM: CPT

## 2019-04-19 RX ORDER — NAPROXEN 500 MG/1
500 TABLET ORAL 2 TIMES DAILY PRN
Qty: 14 TABLET | Refills: 0 | Status: SHIPPED | OUTPATIENT
Start: 2019-04-19 | End: 2020-07-23

## 2019-04-19 RX ORDER — KETOROLAC TROMETHAMINE 30 MG/ML
15 INJECTION, SOLUTION INTRAMUSCULAR; INTRAVENOUS ONCE
Status: COMPLETED | OUTPATIENT
Start: 2019-04-19 | End: 2019-04-19

## 2019-04-19 RX ORDER — LIDOCAINE 50 MG/G
1 PATCH TOPICAL EVERY 24 HOURS
Qty: 6 PATCH | Refills: 0 | Status: SHIPPED | OUTPATIENT
Start: 2019-04-19 | End: 2020-07-23

## 2019-04-19 RX ORDER — METHOCARBAMOL 750 MG/1
750 TABLET, FILM COATED ORAL 3 TIMES DAILY PRN
Qty: 42 TABLET | Refills: 0 | Status: SHIPPED | OUTPATIENT
Start: 2019-04-19 | End: 2020-07-23

## 2019-04-19 RX ADMIN — TETANUS TOXOID, REDUCED DIPHTHERIA TOXOID AND ACELLULAR PERTUSSIS VACCINE, ADSORBED 0.5 ML: 5; 2.5; 8; 8; 2.5 SUSPENSION INTRAMUSCULAR at 15:33

## 2019-04-19 RX ADMIN — KETOROLAC TROMETHAMINE 15 MG: 30 INJECTION, SOLUTION INTRAMUSCULAR; INTRAVENOUS at 15:32

## 2020-01-19 ENCOUNTER — HOSPITAL ENCOUNTER (EMERGENCY)
Facility: HOSPITAL | Age: 30
Discharge: HOME | End: 2020-01-19
Attending: EMERGENCY MEDICINE | Admitting: EMERGENCY MEDICINE
Payer: COMMERCIAL

## 2020-01-19 ENCOUNTER — APPOINTMENT (EMERGENCY)
Dept: RADIOLOGY | Facility: HOSPITAL | Age: 30
End: 2020-01-19
Payer: COMMERCIAL

## 2020-01-19 VITALS
TEMPERATURE: 98.5 F | OXYGEN SATURATION: 99 % | RESPIRATION RATE: 22 BRPM | HEART RATE: 104 BPM | DIASTOLIC BLOOD PRESSURE: 81 MMHG | SYSTOLIC BLOOD PRESSURE: 138 MMHG

## 2020-01-19 DIAGNOSIS — R09.89 FOREIGN BODY SENSATION IN THROAT: Primary | ICD-10-CM

## 2020-01-19 PROCEDURE — 99284 EMERGENCY DEPT VISIT MOD MDM: CPT | Performed by: EMERGENCY MEDICINE

## 2020-01-19 PROCEDURE — 71250 CT THORAX DX C-: CPT

## 2020-01-19 PROCEDURE — 70490 CT SOFT TISSUE NECK W/O DYE: CPT

## 2020-01-19 PROCEDURE — 99284 EMERGENCY DEPT VISIT MOD MDM: CPT

## 2020-01-19 PROCEDURE — 96374 THER/PROPH/DIAG INJ IV PUSH: CPT

## 2020-01-19 RX ORDER — LIDOCAINE HYDROCHLORIDE 20 MG/ML
15 SOLUTION OROPHARYNGEAL ONCE
Status: COMPLETED | OUTPATIENT
Start: 2020-01-19 | End: 2020-01-19

## 2020-01-19 RX ORDER — ONDANSETRON 2 MG/ML
4 INJECTION INTRAMUSCULAR; INTRAVENOUS ONCE
Status: COMPLETED | OUTPATIENT
Start: 2020-01-19 | End: 2020-01-19

## 2020-01-19 RX ADMIN — LIDOCAINE HYDROCHLORIDE 15 ML: 20 SOLUTION ORAL; TOPICAL at 01:53

## 2020-01-19 RX ADMIN — ONDANSETRON 4 MG: 2 INJECTION INTRAMUSCULAR; INTRAVENOUS at 01:53

## 2020-01-19 NOTE — DISCHARGE INSTRUCTIONS
Please follow-up with the [facility] and or the infolink number at the contact information below in order to find a primary care physician  Return to the emergency department for any worsening or otherwise concerning symptoms

## 2020-01-19 NOTE — ED PROVIDER NOTES
History  Chief Complaint   Patient presents with    Foreign Body in Throat     pt reports that she woke up suddenly from sleep with "something in my throat"  PT is not swallowing own saliva  O2 saturation 99% and lung fields clear     This is a 77-year-old female with a past medical history of depression presents to the emergency department this morning with a sensation of a foreign body in her throat  Patient states that she went to bed last night in her normal state health without any complaints but woke up around 130 with a sensation that there was a lump in her throat and she was having trouble breathing  She then drove herself to the emergency department for further evaluation  Patient states that she had a couple beers last evening but denies any drug use  She denies any history of anaphylaxis and her only allergy is to Reglan, which gives her hives  She did not take any medication for her symptoms and denies any rash, abdominal pain, or vomiting  She does note some mild nausea  Patient has never had this happen to her before  She is unsure when her last menstrual period is because her birth control causes her to have irregular periods  Prior to Admission Medications   Prescriptions Last Dose Informant Patient Reported?  Taking?   lidocaine (LIDODERM) 5 %   No No   Sig: Apply 1 patch topically every 24 hours Remove & Discard patch within 12 hours or as directed by MD   methocarbamol (ROBAXIN) 750 mg tablet   No No   Sig: Take 1 tablet (750 mg total) by mouth 3 (three) times a day as needed for muscle spasms   naproxen (NAPROSYN) 500 mg tablet   No No   Sig: Take 1 tablet (500 mg total) by mouth 2 (two) times a day as needed for mild pain or moderate pain for up to 14 doses      Facility-Administered Medications: None       Past Medical History:   Diagnosis Date    Depression     History of positive PPD     Known health problems: none     Pelvic kidney     Pelvic kidney     left side Past Surgical History:   Procedure Laterality Date    APPENDECTOMY         Family History   Problem Relation Age of Onset    Crohn's disease Brother     Crohn's disease Maternal Aunt     Diabetes Maternal Grandmother     Diabetes Maternal Grandfather     Diabetes Paternal Grandmother     Diabetes Paternal Grandfather      I have reviewed and agree with the history as documented  Social History     Tobacco Use    Smoking status: Heavy Tobacco Smoker     Packs/day: 0 50     Types: Cigarettes    Smokeless tobacco: Never Used   Substance Use Topics    Alcohol use: Yes     Comment: social    Drug use: No        Review of Systems   Constitutional: Negative for chills, fatigue and fever  HENT: Negative for congestion, rhinorrhea, sinus pressure and sore throat  Eyes: Negative for visual disturbance  Respiratory: Positive for choking, shortness of breath, wheezing and stridor  Negative for cough  Cardiovascular: Negative for chest pain  Gastrointestinal: Negative for abdominal pain, constipation, diarrhea, nausea and vomiting  Genitourinary: Negative for dysuria, frequency, hematuria and urgency  Musculoskeletal: Negative for arthralgias and myalgias  Skin: Negative for color change and rash  Neurological: Negative for dizziness, light-headedness and numbness  Physical Exam  ED Triage Vitals [01/19/20 0142]   Temperature Pulse Respirations Blood Pressure SpO2   98 5 °F (36 9 °C) (!) 111 22 138/81 99 %      Temp Source Heart Rate Source Patient Position - Orthostatic VS BP Location FiO2 (%)   Oral Monitor Sitting Right arm --      Pain Score       --             Orthostatic Vital Signs  Vitals:    01/19/20 0142 01/19/20 0145   BP: 138/81    Pulse: (!) 111 104   Patient Position - Orthostatic VS: Sitting        Physical Exam   Constitutional: She is oriented to person, place, and time  She appears well-developed and well-nourished  No distress     HENT:   Head: Normocephalic and atraumatic  No oropharyngeal or tongue swelling   Eyes: Pupils are equal, round, and reactive to light  Conjunctivae and EOM are normal  Right eye exhibits no discharge  Left eye exhibits no discharge  No scleral icterus  Neck: Normal range of motion  Neck supple  No JVD present  Cardiovascular: Normal rate, regular rhythm and normal heart sounds  Exam reveals no gallop and no friction rub  No murmur heard  Pulmonary/Chest: Effort normal and breath sounds normal  No stridor  No respiratory distress  She has no wheezes  She has no rales  Patient had a significant amount of upper airway vocalizations on exam and when she stopped using her voice she had no stridulous breathing, or wheezing  Her saturations never dropped below 99%   Abdominal: Soft  Bowel sounds are normal  She exhibits no distension  There is no tenderness  There is no guarding  Musculoskeletal: Normal range of motion  She exhibits no edema, tenderness or deformity  Neurological: She is alert and oriented to person, place, and time  No cranial nerve deficit or sensory deficit  She exhibits normal muscle tone  Skin: Skin is warm and dry  No rash noted  She is not diaphoretic  No erythema  No pallor  No rash or erythema on skin exam    Psychiatric: She has a normal mood and affect  Her behavior is normal    Nursing note and vitals reviewed  ED Medications  Medications   Lidocaine Viscous HCl (XYLOCAINE) 2 % mucosal solution 15 mL (15 mL Swish & Spit Given 1/19/20 0153)   ondansetron (ZOFRAN) injection 4 mg (4 mg Intravenous Given 1/19/20 0153)       Diagnostic Studies  Results Reviewed     None                 CT chest wo contrast   Final Result by Caterina Ryder MD (01/19 0231)      No evidence of foreign body  No evidence of acute intrathoracic pathology  Workstation performed: [id number]         CT soft tissue neck wo contrast   Final Result by Caterina Ryder MD (01/19 0241)      No evidence of foreign body    No evidence of mass or pathologic adenopathy  Workstation performed: [id number]               Procedures  Procedures      ED Course                               MDM  Number of Diagnoses or Management Options  Foreign body sensation in throat:   Diagnosis management comments: Patient had a normal CT scan of her neck and chest   Her vitals continued to be within normal limits  Offered to watch the patient here in the emergency department but she states that she would rather leave to go home and take care of her children  Patient was told to continue taking Tylenol and ibuprofen for pain and return to the emergency department for any worsening or otherwise concerning symptoms  Patient told to call 911 rather than drive herself if she is having difficulty breathing  Disposition  Final diagnoses:   Foreign body sensation in throat     Time reflects when diagnosis was documented in both MDM as applicable and the Disposition within this note     Time User Action Codes Description Comment    1/19/2020  2:51 AM Nataly Mcfadden Add [R09 89] Foreign body sensation in throat       ED Disposition     ED Disposition Condition Date/Time Comment    Discharge Stable Sun Jan 19, 2020  2:51 AM Mikey Mccain discharge to home/self care              Follow-up Information     Follow up With Specialties Details Why Contact Info Additional [address] Emergency Department Emergency Medicine  If symptoms worsen [address]  [phone]  ED, [address] [place] [address], [address] Internal Medicine Schedule an appointment as soon as possible for a visit   [address] [address]  [address] [place] [address], [address] [place] [address], [address] [phone]    Infolink  Call   [phone]             Discharge Medication List as of 1/19/2020  2:52 AM      CONTINUE these medications which have NOT CHANGED    Details   lidocaine (LIDODERM) 5 % Apply 1 patch topically every 24 hours Remove & Discard patch within 12 hours or as directed by MD, Starting Fri 4/19/2019, Print      methocarbamol (ROBAXIN) 750 mg tablet Take 1 tablet (750 mg total) by mouth 3 (three) times a day as needed for muscle spasms, Starting Fri 4/19/2019, Print      naproxen (NAPROSYN) 500 mg tablet Take 1 tablet (500 mg total) by mouth 2 (two) times a day as needed for mild pain or moderate pain for up to 14 doses, Starting Fri 4/19/2019, Print           No discharge procedures on file  ED Provider  Attending physically available and evaluated Caroline Jeanette ROSARIO managed the patient along with the ED Attending      Electronically Signed by         Sahil Dukes MD  01/19/20 4187

## 2020-01-19 NOTE — ED ATTENDING ATTESTATION
1/19/2020  I, Jalyn Menendez MD, saw and evaluated the patient  I have discussed the patient with the resident/non-physician practitioner and agree with the resident's/non-physician practitioner's findings, Plan of Care, and MDM as documented in the resident's/non-physician practitioner's note, except where noted  All available labs and Radiology studies were reviewed  I was present for key portions of any procedure(s) performed by the resident/non-physician practitioner and I was immediately available to provide assistance  At this point I agree with the current assessment done in the Emergency Department  I have conducted an independent evaluation of this patient a history and physical is as follows:    ED Course     Emergency Department Note- Stephania Hallman 34 y o  female MRN: [id number]    Unit/Bed#: ED 13 Encounter: [id number]    Stephania Hallman is a 34 y o  female who presents with   Chief Complaint   Patient presents with    Foreign Body in Throat     pt reports that she woke up suddenly from sleep with "something in my throat"  PT is not swallowing own saliva  O2 saturation 99% and lung fields clear         History of Present Illness   HPI:  Stephania Hallman is a 34 y o  female who presents for evaluation of:  Abrupt onset of painful throat; sensation that something is stuck in her throat  Patient woke up abruptly with these symptoms  Patient denies difficulty eating yesterday and last night  Patient denies drinking any hot liquids  Patient denies h/o endoscopy and esophageal abnormalities  Review of Systems   Constitutional: Negative for chills and fever  HENT: Positive for sore throat  Negative for congestion  Respiratory: Negative for cough  Cardiovascular: Negative for chest pain and palpitations  All other systems reviewed and are negative        Historical Information   Past Medical History:   Diagnosis Date    Depression     History of positive PPD     Known health problems: none     Pelvic kidney     Pelvic kidney     left side     Past Surgical History:   Procedure Laterality Date    APPENDECTOMY       Social History   Social History     Substance and Sexual Activity   Alcohol Use Yes    Comment: social     Social History     Substance and Sexual Activity   Drug Use No     Social History     Tobacco Use   Smoking Status Heavy Tobacco Smoker    Packs/day: 0 50    Types: Cigarettes   Smokeless Tobacco Never Used     Family History: non-contributory    Meds/Allergies   all medications and allergies reviewed  Allergies   Allergen Reactions    Reglan [Metoclopramide]        Objective   First Vitals:   Blood Pressure: 138/81 (01/19/20 0142)  Pulse: (!) 111 (01/19/20 0142)  Temperature: 98 5 °F (36 9 °C) (01/19/20 0142)  Temp Source: Oral (01/19/20 0142)  Respirations: 22 (01/19/20 0142)  SpO2: 99 % (01/19/20 0142)    Current Vitals:   Blood Pressure: 138/81 (01/19/20 0142)  Pulse: 104 (01/19/20 0145)  Temperature: 98 5 °F (36 9 °C) (01/19/20 0142)  Temp Source: Oral (01/19/20 0142)  Respirations: 22 (01/19/20 0145)  SpO2: 99 % (01/19/20 0145)    No intake or output data in the 24 hours ending 01/19/20 0202    Invasive Devices     Peripheral Intravenous Line            Peripheral IV 01/19/20 Right Antecubital less than 1 day                Physical Exam   Constitutional: She is oriented to person, place, and time  She appears well-developed and well-nourished  HENT:   Head: Normocephalic and atraumatic  Mouth/Throat: Uvula is midline, oropharynx is clear and moist and mucous membranes are normal  No trismus in the jaw  No uvula swelling  Neck: Normal range of motion  Neck supple  Cardiovascular: Normal rate and regular rhythm  Pulmonary/Chest: Effort normal and breath sounds normal  No respiratory distress  Abdominal: Soft  Bowel sounds are normal    Musculoskeletal: Normal range of motion  She exhibits no deformity     Neurological: She is alert and oriented to person, place, and time  Skin: Skin is warm and dry  Capillary refill takes less than 2 seconds  Psychiatric: She has a normal mood and affect  Her behavior is normal  Judgment and thought content normal    Nursing note and vitals reviewed  33 yo female presents agitated, speaking in full sentences, no dysphonia  Medical Decision Makin  FB sensation throat: no evidence of stridor, no hypoxia    No results found for this or any previous visit (from the past 36 hour(s))  CT soft tissue neck    (Results Pending)   CT chest without contrast    (Results Pending)         Portions of the record may have been created with voice recognition software  Occasional wrong word or "sound a like" substitutions may have occurred due to the inherent limitations of voice recognition software  Read the chart carefully and recognize, using context, where substitutions have occurred          Critical Care Time  Procedures

## 2020-01-19 NOTE — ED NOTES
Patient reports that yesterday she was having pain in her neck and "vomiting bright red blood"  RN clarified with patient that there were not specs of blood in vomit and patient states "no I was vomiting up straight blood clots and I couldn't turn my head to the left"   Resident caring for patient made aware     June Jc RN  01/19/20 7621

## 2020-06-02 ENCOUNTER — TELEPHONE (OUTPATIENT)
Dept: OBGYN CLINIC | Facility: CLINIC | Age: 30
End: 2020-06-02

## 2020-06-03 ENCOUNTER — OFFICE VISIT (OUTPATIENT)
Dept: OBGYN CLINIC | Facility: CLINIC | Age: 30
End: 2020-06-03

## 2020-06-03 VITALS
BODY MASS INDEX: 28.45 KG/M2 | DIASTOLIC BLOOD PRESSURE: 57 MMHG | HEIGHT: 66 IN | TEMPERATURE: 96.7 F | WEIGHT: 177 LBS | SYSTOLIC BLOOD PRESSURE: 111 MMHG | HEART RATE: 82 BPM

## 2020-06-03 DIAGNOSIS — Z01.419 WOMEN'S ANNUAL ROUTINE GYNECOLOGICAL EXAMINATION: Primary | ICD-10-CM

## 2020-06-03 DIAGNOSIS — Z13.31 SCREENING FOR DEPRESSION: ICD-10-CM

## 2020-06-03 DIAGNOSIS — Z20.2 POSSIBLE EXPOSURE TO STD: ICD-10-CM

## 2020-06-03 DIAGNOSIS — T83.32XA INTRAUTERINE CONTRACEPTIVE DEVICE THREADS LOST, INITIAL ENCOUNTER: ICD-10-CM

## 2020-06-03 PROCEDURE — 99395 PREV VISIT EST AGE 18-39: CPT | Performed by: NURSE PRACTITIONER

## 2020-06-03 PROCEDURE — G0124 SCREEN C/V THIN LAYER BY MD: HCPCS | Performed by: PATHOLOGY

## 2020-06-03 PROCEDURE — 87624 HPV HI-RISK TYP POOLED RSLT: CPT | Performed by: NURSE PRACTITIONER

## 2020-06-03 PROCEDURE — G0145 SCR C/V CYTO,THINLAYER,RESCR: HCPCS | Performed by: PATHOLOGY

## 2020-06-03 PROCEDURE — 87591 N.GONORRHOEAE DNA AMP PROB: CPT | Performed by: NURSE PRACTITIONER

## 2020-06-03 PROCEDURE — 87491 CHLMYD TRACH DNA AMP PROBE: CPT | Performed by: NURSE PRACTITIONER

## 2020-06-04 ENCOUNTER — PATIENT OUTREACH (OUTPATIENT)
Dept: OBGYN CLINIC | Facility: CLINIC | Age: 30
End: 2020-06-04

## 2020-06-04 LAB
C TRACH DNA SPEC QL NAA+PROBE: NEGATIVE
N GONORRHOEA DNA SPEC QL NAA+PROBE: NEGATIVE

## 2020-06-06 LAB
HPV HR 12 DNA CVX QL NAA+PROBE: NEGATIVE
HPV16 DNA CVX QL NAA+PROBE: POSITIVE
HPV18 DNA CVX QL NAA+PROBE: NEGATIVE

## 2020-06-09 ENCOUNTER — PATIENT OUTREACH (OUTPATIENT)
Dept: OBGYN CLINIC | Facility: CLINIC | Age: 30
End: 2020-06-09

## 2020-06-17 ENCOUNTER — TELEPHONE (OUTPATIENT)
Dept: OBGYN CLINIC | Facility: CLINIC | Age: 30
End: 2020-06-17

## 2020-06-19 ENCOUNTER — HOSPITAL ENCOUNTER (OUTPATIENT)
Dept: RADIOLOGY | Facility: HOSPITAL | Age: 30
Discharge: HOME | End: 2020-06-19
Payer: COMMERCIAL

## 2020-06-19 DIAGNOSIS — T83.32XA INTRAUTERINE CONTRACEPTIVE DEVICE THREADS LOST, INITIAL ENCOUNTER: ICD-10-CM

## 2020-06-19 LAB
LAB AP GYN PRIMARY INTERPRETATION: ABNORMAL
Lab: ABNORMAL
PATH INTERP SPEC-IMP: ABNORMAL

## 2020-06-19 PROCEDURE — 76856 US EXAM PELVIC COMPLETE: CPT

## 2020-06-19 PROCEDURE — 76830 TRANSVAGINAL US NON-OB: CPT

## 2020-06-22 ENCOUNTER — TELEPHONE (OUTPATIENT)
Dept: OBGYN CLINIC | Facility: CLINIC | Age: 30
End: 2020-06-22

## 2020-06-24 ENCOUNTER — TELEPHONE (OUTPATIENT)
Dept: OBGYN CLINIC | Facility: CLINIC | Age: 30
End: 2020-06-24

## 2020-06-25 ENCOUNTER — PROCEDURE VISIT (OUTPATIENT)
Dept: OBGYN CLINIC | Facility: CLINIC | Age: 30
End: 2020-06-25

## 2020-06-25 VITALS
TEMPERATURE: 97.6 F | WEIGHT: 181 LBS | BODY MASS INDEX: 29.09 KG/M2 | DIASTOLIC BLOOD PRESSURE: 63 MMHG | SYSTOLIC BLOOD PRESSURE: 97 MMHG | HEART RATE: 76 BPM | HEIGHT: 66 IN

## 2020-06-25 DIAGNOSIS — R87.613 HGSIL (HIGH GRADE SQUAMOUS INTRAEPITHELIAL LESION) ON PAP SMEAR OF CERVIX: Primary | ICD-10-CM

## 2020-06-25 LAB — SL AMB POCT URINE HCG: NEGATIVE

## 2020-06-25 PROCEDURE — 88305 TISSUE EXAM BY PATHOLOGIST: CPT | Performed by: PATHOLOGY

## 2020-06-25 PROCEDURE — 57454 BX/CURETT OF CERVIX W/SCOPE: CPT | Performed by: STUDENT IN AN ORGANIZED HEALTH CARE EDUCATION/TRAINING PROGRAM

## 2020-06-25 PROCEDURE — 81025 URINE PREGNANCY TEST: CPT | Performed by: STUDENT IN AN ORGANIZED HEALTH CARE EDUCATION/TRAINING PROGRAM

## 2020-07-06 ENCOUNTER — TELEPHONE (OUTPATIENT)
Dept: OBGYN CLINIC | Facility: CLINIC | Age: 30
End: 2020-07-06

## 2020-07-06 ENCOUNTER — OFFICE VISIT (OUTPATIENT)
Dept: OBGYN CLINIC | Facility: CLINIC | Age: 30
End: 2020-07-06

## 2020-07-06 VITALS
HEIGHT: 66 IN | DIASTOLIC BLOOD PRESSURE: 70 MMHG | SYSTOLIC BLOOD PRESSURE: 111 MMHG | BODY MASS INDEX: 29.09 KG/M2 | HEART RATE: 101 BPM | WEIGHT: 181 LBS | TEMPERATURE: 98.3 F

## 2020-07-06 DIAGNOSIS — R87.613 HGSIL (HIGH GRADE SQUAMOUS INTRAEPITHELIAL LESION) ON PAP SMEAR OF CERVIX: Primary | ICD-10-CM

## 2020-07-06 PROCEDURE — 99213 OFFICE O/P EST LOW 20 MIN: CPT | Performed by: OBSTETRICS & GYNECOLOGY

## 2020-07-06 NOTE — TELEPHONE ENCOUNTER
1  Do you presently have a fever or flu-like symptoms? No  2  Do you have symptoms of an upper respiratory infection like runny nose, sore throat, or cough? No  3  Are you suffering from new headache that you have not had in the past?  No  4  Do you have/have you experienced any new shortness of breath recently? NO   5  Do you have any new diarrhea, nausea or vomiting? No  6  Have you been in contact with anyone who has been sick or diagnosed with COVID-19?  No

## 2020-07-06 NOTE — PROGRESS NOTES
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  6/25/2020: Colposcopy showed MESFIN 2-3 at 11-12:00, 1:00 and ECC  Cindra Pardon in place, IUD strings lost  Cindra Pardon [facility] [place] [facility]  TVUS 6/19/20 demonstrated IUD in uterus    Desire for permanent sterilization  Patient is P4 female, desires permanent sterilization  [place] consent form signed 6/25/20  Incidental finding of left pelvic kidney  Finding noted on 12/8/2017     PMH: incidental finding of left pelvic kidney  PSH: appendectomy  Allergies: metoclopramide     COVID19 Screen:    Cough: No  Fever: No  Shortness of breath: No   Known exposures to COVID-19, or international travel: No    Objective:    Vitals: Blood pressure 111/70, pulse 101, temperature 98 3 °F (36 8 °C), temperature source Oral, height 5' 6" (1 676 m), weight 82 1 kg (181 lb), not currently breastfeeding  Body mass index is 29 21 kg/m²  Physical Exam   Constitutional: She is oriented to person, place, and time  She appears well-developed and well-nourished  No distress  Cardiovascular: Normal rate, regular rhythm and normal heart sounds  Pulmonary/Chest: Effort normal and breath sounds normal    Abdominal: Soft  Bowel sounds are normal  She exhibits no distension and no mass  There is no tenderness  There is no rebound and no guarding  No hernia  Genitourinary:   Genitourinary Comments: Uterus small, mobile, negative CMT  Moderate amount of descensus with valsalva  No palpable adnexal abnormalities  Neurological: She is alert and oriented to person, place, and time  Skin: She is not diaphoretic  Psychiatric: She has a normal mood and affect   Her behavior is normal  Judgment and thought content normal    Vitals reviewed  Assessment/Plan:    27year old P4 female here for colposcopy results  I had a lengthy discussion with patient regarding 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  Discussed that per ASCCP guidelines, she should undergo cervical excision via LEEP  Given that she has had a history of abnormal pap smears, desires permanent sterilization, and has IUD in place requiring hysteroscopic removal given missing IUD strings, she is a good candidate for a hysterectomy rather than IUD removal, bilateral salpingectomy, and LEEP procedure, especially given a risk of future abnormal pap smears needing colposcopy and/or further excisional procedures  I discussed total vaginal hysterectomy vs laparoscopic assisted vaginal hysterectomy vs total laparoscopic hysterectomy  Given her 4 prior , she has mild descensus on exam  She is a good candidate for a vaginal hysterectomy  She understands that she will no longer be able to bear children following the completion of this procedure  Risks, benefits, and alternatives have been reviewed with the patient, including but not limited to infection, bleeding, injury to the uterus and surrounding organs, such as bowel, bladder, blood vessels, nerves and ureters, risks of anesthesia, blood clots and death  She has been counseled regarding the possible need for a blood transfusion and she accepts blood products  The patient is aware that if the procedure cannot be safely completed laparoscopically, we will proceed with an open procedure  The patients fallopian tubes will be removed at this time  The patients ovaries will be maintained at this time, unless ovarian abnormality is noted during surgery that necessitates surgical removal      I consented her for a diagnostic laparoscopy, laparoscopic assisted vaginal hysterectomy, bilateral salpingectomy, possible oophorectomy, all other indicated procedures   I signed the consent form with   Perry Carcamo (attending in clinic) in the room with me  I also discussed her case with Dr Starla Gonsales who will be the attending on day of surgery  I discussed with patient that this case will be reviewed by a surgical committee  She does not need preoperative clearance  If it is approved, she will receive a phone call from [facility] (surgical scheduler) and will then return to clinic for H&P 7-14 days prior to surgery  Hibiclens soap given to patient today         Kesha Hicks MD  7/6/2020  12:20 PM

## 2020-07-13 NOTE — QUICK NOTE
Loreta Franz & Carol 7/10/20    Per review of surgical case, LEEP is strongly recommended as exicisional procedure is both diagnostic and potentially curative prior to any future hysterectomy  Option #1  LEEP can be done concurrently with laparoscopic salpingectomy for sterilization (Pt had signed MA-31 forms), as well as hysteroscopic removal of IUD (missing Cecilia Dominguez)  Option #2  Alternatively, only LEEP can be done and pending negative pathology results hysterectomy with salpingectomy can be pursued  If LEEP is positive then repeat excisional procedure is indicated  Option #3  Hysterectomy could theoretically be performed if patient is willing to accept the possibility that an invasive cancer may exist and that repeat surgery may be necessary with added morbidity and mortality related to possible chemo/radiation treatment  However, this would be strongly discouraged and adequate counseling would be necessary      Johnna Ganser,   PGY-4 OB/GYN  7/13/2020 3:49 PM

## 2020-07-15 ENCOUNTER — TELEPHONE (OUTPATIENT)
Dept: OBGYN CLINIC | Facility: CLINIC | Age: 30
End: 2020-07-15

## 2020-07-15 NOTE — TELEPHONE ENCOUNTER
Called patient to discuss surgery options, after her case was reviewed by surgical committee  Option #1: LEEP, laparoscopic salpingectomy, hysteroscopic removal of Liletta IUD    Option #2: LEEP  Pending pathology results, LAVH + BS as previously discussed in office  Patient is adamant about desiring a hysterectomy with bilateral salpingectomy as she is done with childbearing and does not desire future pap smears with colposcopies and possible repeat excisional procedures  She would like to undergo LEEP at this time  If pathology returns as cervical cancer, she understands that she will be referred to gynecologic oncology  If pathology is unremarkable, she would like to undergo laparoscopic-assisted vaginal hysterectomy and bilateral salpingectomy by [facility]  Patient to return to office tomorrow to sign consent form for LEEP      Discussed with Dr Anastasiia Lux MD  OBGYN, PGY-4  7/15/2020  3:49 PM

## 2020-07-16 ENCOUNTER — TELEPHONE (OUTPATIENT)
Dept: OBGYN CLINIC | Facility: CLINIC | Age: 30
End: 2020-07-16

## 2020-07-16 ENCOUNTER — PREP FOR PROCEDURE (OUTPATIENT)
Dept: OBGYN CLINIC | Facility: CLINIC | Age: 30
End: 2020-07-16

## 2020-07-16 DIAGNOSIS — N87.9 DYSPLASIA OF CERVIX: Primary | ICD-10-CM

## 2020-07-16 DIAGNOSIS — Z11.59 SPECIAL SCREENING EXAMINATION FOR VIRAL DISEASE: ICD-10-CM

## 2020-07-16 NOTE — TELEPHONE ENCOUNTER
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  Patient has a long standing history of abnormal uterine bleeding requiring Liletta use  Reports multiple pad use, heavy bleeding, with episodes of dizziness during periods before Cindra Pardon was placed in 2019  Patient would like to proceed with exam under anesthesia, CKC, and ECC  When pathology is back, if cancer, she will be referred to [facility]  If pathology is not cancer, she would like to proceed with hysterectomy, bilateral salpingectomy, given her history of abnormal pap smears, desire for sterilization, and history of abnormal uterine bleeding  Discussed that she will still need pap smears of vaginal cuff after hysterectomy due to 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

## 2020-07-16 NOTE — TELEPHONE ENCOUNTER
Patient was informed of her surgery on 8/31/20 and H&P on 7/22/20  Covid test 10 days prior to surgery  Information mailed to patient  Pre auth not required

## 2020-07-21 ENCOUNTER — TELEPHONE (OUTPATIENT)
Dept: OBGYN CLINIC | Facility: CLINIC | Age: 30
End: 2020-07-21

## 2020-07-21 DIAGNOSIS — N87.9 DYSPLASIA OF CERVIX: ICD-10-CM

## 2020-07-21 DIAGNOSIS — Z11.59 SPECIAL SCREENING EXAMINATION FOR VIRAL DISEASE: ICD-10-CM

## 2020-07-21 PROCEDURE — U0003 INFECTIOUS AGENT DETECTION BY NUCLEIC ACID (DNA OR RNA); SEVERE ACUTE RESPIRATORY SYNDROME CORONAVIRUS 2 (SARS-COV-2) (CORONAVIRUS DISEASE [COVID-19]), AMPLIFIED PROBE TECHNIQUE, MAKING USE OF HIGH THROUGHPUT TECHNOLOGIES AS DESCRIBED BY CMS-2020-01-R: HCPCS

## 2020-07-22 ENCOUNTER — OFFICE VISIT (OUTPATIENT)
Dept: OBGYN CLINIC | Facility: CLINIC | Age: 30
End: 2020-07-22

## 2020-07-22 VITALS
TEMPERATURE: 98 F | BODY MASS INDEX: 29.57 KG/M2 | SYSTOLIC BLOOD PRESSURE: 121 MMHG | DIASTOLIC BLOOD PRESSURE: 78 MMHG | HEIGHT: 66 IN | WEIGHT: 184 LBS

## 2020-07-22 DIAGNOSIS — Z01.818 ENCOUNTER FOR PREOPERATIVE EXAMINATION FOR GENERAL SURGICAL PROCEDURE: Primary | ICD-10-CM

## 2020-07-22 LAB — SARS-COV-2 RNA SPEC QL NAA+PROBE: NOT DETECTED

## 2020-07-22 PROCEDURE — 99214 OFFICE O/P EST MOD 30 MIN: CPT | Performed by: OBSTETRICS & GYNECOLOGY

## 2020-07-22 NOTE — PROGRESS NOTES
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 and ECC  PLAN:  - Procedure scheduled for 2020  - Please see prior notes and encounters regarding counseling  - Risks of procedure discussed today, patient understands that we will send sample for pathology  - If no cancer, she desires future hysterectomy and bilateral salpingectomy   - Patient given instructions        OB History    Para Term  AB Living   4 4 3 1   4   SAB TAB Ectopic Multiple Live Births         0 4      # Outcome Date GA Lbr Oscar/2nd Weight Sex Delivery Anes PTL Lv   4 Term 16 40w1d / 00:08 3005 g (6 lb 10 oz) F Vag-Spont None N QUYNH   3 Term 04/10/15 39w2d  2920 g (6 lb 7 oz) M Vag-Spont  N QUYNH   2  13 36w0d  2268 g (5 lb) F Vag-Spont  Y QUYNH      Complications: IUGR (intrauterine growth restriction) affecting care of mother, Pyelonephritis   1 Term 09 37w0d  2665 g (5 lb 14 oz) M Vag-Spont  N QUYNH       Review of Systems   Constitutional: Negative for chills and fever  Eyes: Negative for visual disturbance  Respiratory: Negative for cough and shortness of breath  Cardiovascular: Negative for chest pain, palpitations and leg swelling  Gastrointestinal: Negative for abdominal pain, diarrhea, nausea and vomiting  Genitourinary: Negative for dysuria, hematuria, pelvic pain, vaginal bleeding, vaginal discharge and vaginal pain  Neurological: Negative for dizziness, light-headedness and headaches           Historical Information   Past Medical History:   Diagnosis Date    Depression     History of positive PPD     Known health problems: none     Pelvic kidney     Pelvic kidney     left side     Past Surgical History:   Procedure Laterality Date    APPENDECTOMY       Social History   Social History Substance and Sexual Activity   Alcohol Use Yes    Comment: social     Social History     Substance and Sexual Activity   Drug Use No     Social History     Tobacco Use   Smoking Status Heavy Tobacco Smoker    Packs/day: 0 50    Types: Cigarettes   Smokeless Tobacco Never Used     Family History: non-contributory    Meds/Allergies      (Not in a hospital admission)     Allergies   Allergen Reactions    Metoclopramide Hives       OBJECTIVE:    Vitals: Blood pressure 121/78, temperature 98 °F (36 7 °C), height 5' 6" (1 676 m), weight 83 5 kg (184 lb), not currently breastfeeding  Body mass index is 29 7 kg/m²  Physical Exam   Constitutional: She is oriented to person, place, and time  She appears well-developed and well-nourished  No distress  HENT:   Head: Normocephalic and atraumatic  Cardiovascular: Normal rate, regular rhythm and normal heart sounds  Pulmonary/Chest: Effort normal and breath sounds normal  No respiratory distress  Abdominal: There is no tenderness  There is no guarding  Musculoskeletal: She exhibits no edema or tenderness  Neurological: She is alert and oriented to person, place, and time  Skin: Skin is warm and dry  Psychiatric: She has a normal mood and affect   Her behavior is normal  Thought content normal

## 2020-07-22 NOTE — H&P (VIEW-ONLY)
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 previous colposcopy and ECC  PLAN:  - Procedure scheduled for 2020  - Please see prior notes and encounters regarding counseling  - Risks of procedure discussed today, patient understands that we will send sample for pathology  - If no cancer, she desires future hysterectomy and bilateral salpingectomy   - Patient given instructions        OB History    Para Term  AB Living   4 4 3 1   4   SAB TAB Ectopic Multiple Live Births         0 4      # Outcome Date GA Lbr Oscar/2nd Weight Sex Delivery Anes PTL Lv   4 Term 16 40w1d / 00:08 3005 g (6 lb 10 oz) F Vag-Spont None N QUYNH   3 Term 04/10/15 39w2d  2920 g (6 lb 7 oz) M Vag-Spont  N QUYNH   2  13 36w0d  2268 g (5 lb) F Vag-Spont  Y QUYNH      Complications: IUGR (intrauterine growth restriction) affecting care of mother, Pyelonephritis   1 Term 09 37w0d  2665 g (5 lb 14 oz) M Vag-Spont  N QUYNH       Review of Systems   Constitutional: Negative for chills and fever  Eyes: Negative for visual disturbance  Respiratory: Negative for cough and shortness of breath  Cardiovascular: Negative for chest pain, palpitations and leg swelling  Gastrointestinal: Negative for abdominal pain, diarrhea, nausea and vomiting  Genitourinary: Negative for dysuria, hematuria, pelvic pain, vaginal bleeding, vaginal discharge and vaginal pain  Neurological: Negative for dizziness, light-headedness and headaches           Historical Information   Past Medical History:   Diagnosis Date    Depression     History of positive PPD     Known health problems: none     Pelvic kidney     Pelvic kidney     left side     Past Surgical History:   Procedure Laterality Date    APPENDECTOMY       Social History   Social History Substance and Sexual Activity   Alcohol Use Yes    Comment: social     Social History     Substance and Sexual Activity   Drug Use No     Social History     Tobacco Use   Smoking Status Heavy Tobacco Smoker    Packs/day: 0 50    Types: Cigarettes   Smokeless Tobacco Never Used     Family History: non-contributory    Meds/Allergies      (Not in a hospital admission)     Allergies   Allergen Reactions    Metoclopramide Hives       OBJECTIVE:    Vitals: Blood pressure 121/78, temperature 98 °F (36 7 °C), height 5' 6" (1 676 m), weight 83 5 kg (184 lb), not currently breastfeeding  Body mass index is 29 7 kg/m²  Physical Exam   Constitutional: She is oriented to person, place, and time  She appears well-developed and well-nourished  No distress  HENT:   Head: Normocephalic and atraumatic  Cardiovascular: Normal rate, regular rhythm and normal heart sounds  Pulmonary/Chest: Effort normal and breath sounds normal  No respiratory distress  Abdominal: There is no tenderness  There is no guarding  Musculoskeletal: She exhibits no edema or tenderness  Neurological: She is alert and oriented to person, place, and time  Skin: Skin is warm and dry  Psychiatric: She has a normal mood and affect   Her behavior is normal  Thought content normal

## 2020-07-23 RX ORDER — TRAZODONE HYDROCHLORIDE 50 MG/1
50 TABLET ORAL
COMMUNITY
End: 2022-01-31 | Stop reason: ALTCHOICE

## 2020-07-23 RX ORDER — FLUOXETINE 20 MG/1
20 TABLET, FILM COATED ORAL DAILY
COMMUNITY
End: 2022-01-31 | Stop reason: ALTCHOICE

## 2020-07-23 NOTE — PRE-PROCEDURE INSTRUCTIONS
Pre-Surgery Instructions:   Medication Instructions    FLUoxetine (PROzac) 20 MG tablet Instructed patient per Anesthesia Guidelines   traZODone (DESYREL) 50 mg tablet Instructed patient per Anesthesia Guidelines  Spoke to pt  Medication list reviewed & instructed   As of 7/24 instructed on tylenol only   Am DOS pt ok to take prozac with small sip of water   Showering instructions provided by surgeon office, reviewed @ time of call   Negative COVID screening  Smoking 4 cig / day, advised no smoking am DOS   All instructions verbally understood by patient   All questions answered

## 2020-07-30 ENCOUNTER — ANESTHESIA EVENT (OUTPATIENT)
Dept: PERIOP | Facility: HOSPITAL | Age: 30
End: 2020-07-30
Payer: COMMERCIAL

## 2020-07-31 ENCOUNTER — HOSPITAL ENCOUNTER (OUTPATIENT)
Facility: HOSPITAL | Age: 30
Setting detail: OUTPATIENT SURGERY
Discharge: HOME | End: 2020-07-31
Attending: OBSTETRICS & GYNECOLOGY | Admitting: OBSTETRICS & GYNECOLOGY
Payer: COMMERCIAL

## 2020-07-31 ENCOUNTER — ANESTHESIA (OUTPATIENT)
Dept: PERIOP | Facility: HOSPITAL | Age: 30
End: 2020-07-31
Payer: COMMERCIAL

## 2020-07-31 VITALS
BODY MASS INDEX: 28.77 KG/M2 | HEART RATE: 74 BPM | HEIGHT: 66 IN | OXYGEN SATURATION: 97 % | RESPIRATION RATE: 18 BRPM | TEMPERATURE: 97.1 F | SYSTOLIC BLOOD PRESSURE: 103 MMHG | WEIGHT: 179 LBS | DIASTOLIC BLOOD PRESSURE: 55 MMHG

## 2020-07-31 DIAGNOSIS — N87.9 DYSPLASIA OF CERVIX: ICD-10-CM

## 2020-07-31 LAB
EXT PREGNANCY TEST URINE: NEGATIVE
EXT. CONTROL: NORMAL

## 2020-07-31 PROCEDURE — 88307 TISSUE EXAM BY PATHOLOGIST: CPT | Performed by: PATHOLOGY

## 2020-07-31 PROCEDURE — 81025 URINE PREGNANCY TEST: CPT | Performed by: OBSTETRICS & GYNECOLOGY

## 2020-07-31 PROCEDURE — 88305 TISSUE EXAM BY PATHOLOGIST: CPT | Performed by: PATHOLOGY

## 2020-07-31 PROCEDURE — 57520 CONIZATION OF CERVIX: CPT | Performed by: OBSTETRICS & GYNECOLOGY

## 2020-07-31 RX ORDER — FENTANYL CITRATE 50 UG/ML
INJECTION, SOLUTION INTRAMUSCULAR; INTRAVENOUS AS NEEDED
Status: DISCONTINUED | OUTPATIENT
Start: 2020-07-31 | End: 2020-07-31 | Stop reason: SURG

## 2020-07-31 RX ORDER — MIDAZOLAM HYDROCHLORIDE 2 MG/2ML
INJECTION, SOLUTION INTRAMUSCULAR; INTRAVENOUS AS NEEDED
Status: DISCONTINUED | OUTPATIENT
Start: 2020-07-31 | End: 2020-07-31 | Stop reason: SURG

## 2020-07-31 RX ORDER — KETOROLAC TROMETHAMINE 30 MG/ML
INJECTION, SOLUTION INTRAMUSCULAR; INTRAVENOUS AS NEEDED
Status: DISCONTINUED | OUTPATIENT
Start: 2020-07-31 | End: 2020-07-31 | Stop reason: SURG

## 2020-07-31 RX ORDER — LIDOCAINE HYDROCHLORIDE 10 MG/ML
0.5 INJECTION, SOLUTION EPIDURAL; INFILTRATION; INTRACAUDAL; PERINEURAL ONCE AS NEEDED
Status: COMPLETED | OUTPATIENT
Start: 2020-07-31 | End: 2020-07-31

## 2020-07-31 RX ORDER — ACETAMINOPHEN 325 MG/1
650 TABLET ORAL EVERY 6 HOURS PRN
Status: DISCONTINUED | OUTPATIENT
Start: 2020-07-31 | End: 2020-07-31 | Stop reason: HOSPADM

## 2020-07-31 RX ORDER — SODIUM CHLORIDE, SODIUM LACTATE, POTASSIUM CHLORIDE, CALCIUM CHLORIDE 600; 310; 30; 20 MG/100ML; MG/100ML; MG/100ML; MG/100ML
125 INJECTION, SOLUTION INTRAVENOUS CONTINUOUS
Status: DISCONTINUED | OUTPATIENT
Start: 2020-07-31 | End: 2020-07-31 | Stop reason: HOSPADM

## 2020-07-31 RX ORDER — FENTANYL CITRATE/PF 50 MCG/ML
25 SYRINGE (ML) INJECTION
Status: CANCELLED | OUTPATIENT
Start: 2020-07-31

## 2020-07-31 RX ORDER — ACETIC ACID 5 %
LIQUID (ML) MISCELLANEOUS AS NEEDED
Status: DISCONTINUED | OUTPATIENT
Start: 2020-07-31 | End: 2020-07-31 | Stop reason: HOSPADM

## 2020-07-31 RX ORDER — LIDOCAINE HYDROCHLORIDE 10 MG/ML
INJECTION, SOLUTION EPIDURAL; INFILTRATION; INTRACAUDAL; PERINEURAL AS NEEDED
Status: DISCONTINUED | OUTPATIENT
Start: 2020-07-31 | End: 2020-07-31 | Stop reason: SURG

## 2020-07-31 RX ORDER — DEXAMETHASONE SODIUM PHOSPHATE 10 MG/ML
INJECTION, SOLUTION INTRAMUSCULAR; INTRAVENOUS AS NEEDED
Status: DISCONTINUED | OUTPATIENT
Start: 2020-07-31 | End: 2020-07-31 | Stop reason: SURG

## 2020-07-31 RX ORDER — FENTANYL CITRATE/PF 50 MCG/ML
50 SYRINGE (ML) INJECTION
Status: CANCELLED | OUTPATIENT
Start: 2020-07-31

## 2020-07-31 RX ORDER — HYDROMORPHONE HCL/PF 1 MG/ML
0.5 SYRINGE (ML) INJECTION
Status: CANCELLED | OUTPATIENT
Start: 2020-07-31

## 2020-07-31 RX ORDER — ONDANSETRON 2 MG/ML
4 INJECTION INTRAMUSCULAR; INTRAVENOUS ONCE AS NEEDED
Status: CANCELLED | OUTPATIENT
Start: 2020-07-31

## 2020-07-31 RX ORDER — DIPHENHYDRAMINE HYDROCHLORIDE 50 MG/ML
12.5 INJECTION INTRAMUSCULAR; INTRAVENOUS ONCE AS NEEDED
Status: CANCELLED | OUTPATIENT
Start: 2020-07-31

## 2020-07-31 RX ORDER — IBUPROFEN 600 MG/1
600 TABLET ORAL EVERY 6 HOURS PRN
Status: DISCONTINUED | OUTPATIENT
Start: 2020-07-31 | End: 2020-07-31 | Stop reason: HOSPADM

## 2020-07-31 RX ORDER — PROPOFOL 10 MG/ML
INJECTION, EMULSION INTRAVENOUS AS NEEDED
Status: DISCONTINUED | OUTPATIENT
Start: 2020-07-31 | End: 2020-07-31 | Stop reason: SURG

## 2020-07-31 RX ORDER — ONDANSETRON 2 MG/ML
INJECTION INTRAMUSCULAR; INTRAVENOUS AS NEEDED
Status: DISCONTINUED | OUTPATIENT
Start: 2020-07-31 | End: 2020-07-31 | Stop reason: SURG

## 2020-07-31 RX ORDER — LIDOCAINE HYDROCHLORIDE AND EPINEPHRINE 10; 10 MG/ML; UG/ML
INJECTION, SOLUTION INFILTRATION; PERINEURAL AS NEEDED
Status: DISCONTINUED | OUTPATIENT
Start: 2020-07-31 | End: 2020-07-31 | Stop reason: HOSPADM

## 2020-07-31 RX ORDER — SODIUM CHLORIDE, SODIUM LACTATE, POTASSIUM CHLORIDE, CALCIUM CHLORIDE 600; 310; 30; 20 MG/100ML; MG/100ML; MG/100ML; MG/100ML
INJECTION, SOLUTION INTRAVENOUS CONTINUOUS PRN
Status: DISCONTINUED | OUTPATIENT
Start: 2020-07-31 | End: 2020-07-31 | Stop reason: SURG

## 2020-07-31 RX ORDER — SODIUM CHLORIDE, SODIUM LACTATE, POTASSIUM CHLORIDE, CALCIUM CHLORIDE 600; 310; 30; 20 MG/100ML; MG/100ML; MG/100ML; MG/100ML
125 INJECTION, SOLUTION INTRAVENOUS CONTINUOUS
Status: CANCELLED | OUTPATIENT
Start: 2020-07-31

## 2020-07-31 RX ADMIN — LIDOCAINE HYDROCHLORIDE 50 MG: 10 INJECTION, SOLUTION EPIDURAL; INFILTRATION; INTRACAUDAL; PERINEURAL at 14:38

## 2020-07-31 RX ADMIN — DEXAMETHASONE SODIUM PHOSPHATE 8 MG: 10 INJECTION, SOLUTION INTRAMUSCULAR; INTRAVENOUS at 14:42

## 2020-07-31 RX ADMIN — PHENYLEPHRINE HYDROCHLORIDE 100 MCG: 10 INJECTION INTRAVENOUS at 15:11

## 2020-07-31 RX ADMIN — SODIUM CHLORIDE, SODIUM LACTATE, POTASSIUM CHLORIDE, AND CALCIUM CHLORIDE: .6; .31; .03; .02 INJECTION, SOLUTION INTRAVENOUS at 14:33

## 2020-07-31 RX ADMIN — FENTANYL CITRATE 50 MCG: 50 INJECTION, SOLUTION INTRAMUSCULAR; INTRAVENOUS at 15:49

## 2020-07-31 RX ADMIN — PHENYLEPHRINE HYDROCHLORIDE 200 MCG: 10 INJECTION INTRAVENOUS at 15:17

## 2020-07-31 RX ADMIN — FENTANYL CITRATE 50 MCG: 50 INJECTION, SOLUTION INTRAMUSCULAR; INTRAVENOUS at 15:00

## 2020-07-31 RX ADMIN — MIDAZOLAM 2 MG: 1 INJECTION INTRAMUSCULAR; INTRAVENOUS at 14:31

## 2020-07-31 RX ADMIN — SODIUM CHLORIDE, SODIUM LACTATE, POTASSIUM CHLORIDE, AND CALCIUM CHLORIDE: .6; .31; .03; .02 INJECTION, SOLUTION INTRAVENOUS at 15:35

## 2020-07-31 RX ADMIN — SODIUM CHLORIDE, SODIUM LACTATE, POTASSIUM CHLORIDE, AND CALCIUM CHLORIDE 125 ML/HR: .6; .31; .03; .02 INJECTION, SOLUTION INTRAVENOUS at 14:19

## 2020-07-31 RX ADMIN — LIDOCAINE HYDROCHLORIDE 0.5 ML: 10 INJECTION, SOLUTION EPIDURAL; INFILTRATION; INTRACAUDAL; PERINEURAL at 14:20

## 2020-07-31 RX ADMIN — PROPOFOL 200 MG: 10 INJECTION, EMULSION INTRAVENOUS at 14:38

## 2020-07-31 RX ADMIN — IBUPROFEN 600 MG: 600 TABLET, FILM COATED ORAL at 16:29

## 2020-07-31 RX ADMIN — FENTANYL CITRATE 50 MCG: 50 INJECTION, SOLUTION INTRAMUSCULAR; INTRAVENOUS at 15:35

## 2020-07-31 RX ADMIN — ONDANSETRON 4 MG: 2 INJECTION INTRAMUSCULAR; INTRAVENOUS at 15:22

## 2020-07-31 RX ADMIN — KETOROLAC TROMETHAMINE 30 MG: 30 INJECTION, SOLUTION INTRAMUSCULAR at 15:22

## 2020-07-31 RX ADMIN — FENTANYL CITRATE 50 MCG: 50 INJECTION, SOLUTION INTRAMUSCULAR; INTRAVENOUS at 15:53

## 2020-07-31 NOTE — DISCHARGE INSTRUCTIONS
Cervical Cone Biopsy   WHAT YOU NEED TO KNOW:   A cervical cone biopsy is surgery to remove abnormal cells from your cervix  The cervix is the opening into your uterus  Once the cells are removed, they are sent to a lab to be tested for cancer  DISCHARGE INSTRUCTIONS:   Medicines:   · Pain medicine: You may be given a prescription medicine to decrease pain  Do not wait until the pain is severe before you take this medicine  · Antibiotics:  Antibiotics may be given to help prevent an infection caused by bacteria  · Take your medicine as directed  Contact your healthcare provider if you think your medicine is not helping or if you have side effects  Tell him or her if you are allergic to any medicine  Keep a list of the medicines, vitamins, and herbs you take  Include the amounts, and when and why you take them  Bring the list or the pill bottles to follow-up visits  Carry your medicine list with you in case of an emergency  Follow up with your healthcare provider or gynecologist as directed: You may need to return to make sure your cervix is healing  Write down your questions so you remember to ask them during your visits  Wound care:  Ask how to care for your wound  Do not use tampons or have sex for 2 to 3 weeks after your surgery  Do not douche during this time  Self-care:   · Practice safe sex:  Use a condom when you have sex  Do not have sex with more than one partner  This may decrease your risk for a sexually transmitted infection, such as HPV, and abnormal cervical cells  · Vaccination:  Ask your healthcare provider or gynecologist for more information about the vaccine for human papillomavirus (HPV)  The HPV vaccine may help decrease your risk for an HPV infection and abnormal cervical cells  · Pregnancy:  Talk with your healthcare provider or gynecologist if you want to become pregnant  A cervical cone biopsy may increase the risk that you deliver your baby early   You may need more care during a future pregnancy to help prevent problems  · Do not smoke: If you smoke, it is never too late to quit  If you smoke, it may take longer for your wounds to heal  Smoking also increases the risk of abnormal cells in your cervix  Ask for information if you need help quitting  Contact your healthcare provider or gynecologist if:   · You have a fever  · You have new or increased pain in your lower abdomen and pelvic area  · You have vaginal bleeding, and it is not time for your monthly period  · You feel pain when you urinate, or your urine looks cloudy  · You have questions or concerns about your condition or care  Seek care immediately or call 911 if:   · You have white or yellow vaginal discharge  · You have more vaginal bleeding than you were told to expect  © 2017 [employer] Information is for End User's use only and may not be sold, redistributed or otherwise used for commercial purposes  All illustrations and images included in CareNotes® are the copyrighted property of A [employer] M [employer]  or Kit Camacho  The above information is an  only  It is not intended as medical advice for individual conditions or treatments  Talk to your doctor, nurse or pharmacist before following any medical regimen to see if it is safe and effective for you

## 2020-07-31 NOTE — OP NOTE
OPERATIVE REPORT  PATIENT NAME: Davina Rodriguez    :  1990  MRN: [id number]  Pt Location: [facility] OR ROOM 03    SURGERY DATE: 2020    Surgeon(s) and Role:     * Brittany Finney MD - Primary     * Candelaria Celeste MD - Assisting    Preop Diagnosis:  Dysplasia of cervix [N87 9]    Post-Op Diagnosis Codes: * Dysplasia of cervix [N87 9]    Procedure(s) (LRB):  BIOPSY CONE/COLD KNIFE CERVIX, ECC (N/A)    Specimen(s):  ID Type Source Tests Collected by Time Destination   1 : ECC Tissue Cervix, Endocervical TISSUE EXAM Brittany Finney MD 2020 [id number]    2 : cold cone biopsy, tag at 12 O'Clock Tissue Cervix TISSUE EXAM Brittany Finney MD 2020 [id number]        Estimated Blood Loss:   Minimal    Anesthesia Type:   General    Operative Indications:  Dysplasia of cervix [N87 9]  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 and endocervical involvement   IUD in situ with non-visible strings    Operative Findings:  Normal appearing external genitalia   Uterus was anteverted, normal size, mobile and no masses appreciated  There was a first degree cystocele, rectocele, and prolapse on traction  Mild cervical eversion after the application of acetic acid  There were faint changed posteriorly in areas of scarring indicative of prior biopsies       Complications:   None    Procedure and Technique:    Indications for the procedure:   History: Ms Davina Rodriguez is a 27y o  year old with 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, HPV positive, with endocervical involvement  Surgical risks: The patient was informed of all the risks and benefits of a loop electrosurgical excision procedure  Risks included but were not limited to bleeding, infection, injury to the vulva, vagina, cervix, uterine perforation, or negative effects on future pregnancy outcomes  The patient expressed understanding the risks involved, all portions were answered, the patient was consented to the procedure  Description of the procedure:     The patient was taken to the operating room where timeout was performed to confirm correct patient and correct procedure  Successful anesthesia was given under general with an LMA  The patient was then positioned on the operating table in dorsolithotomy position with legs supported by stirrups and SCDs bilaterally  All pressure points were padded  Warm blanket was placed to maintain control of core body temperature  The patient was then prepped and draped in usual sterile fashion  A bimanual exam was performed under anesthesia and uterus is found to be anteverted  Operative technique: A weighted speculum was inserted into the vagina and the cervix wad visualized  Two angle stitches of 0-Vicryl were placed at the 3 and 9 O'Clock positions of the cervical neck  Acetic acid was then applied to the cervix and there was noted to be uptake in the posterior area of the cervix at the 6 o'clock position  Local anesthesia of 1% lidocaine with epinephrine was injected circumferentially around the cervix  An Ayre blade was used to excise the specimen starting at the 3:00 position and rotating 360°  The specimen was grasped with an Allis clamp and the base was detached  A modified Sturmdorf suture of 0-Vicryl was placed anteriorly and posteriorly  The gross specimen was removed, tagged at 12:00 position, and sent to pathology  Endocervical curettings was completed using the Kevfatimahian curet, the specimen was placed on Telfa, and also sent to pathology  Ball electrocautery was used to coagulate the cut surfaces  Monsel solution was then applied to maintain hemostasis  Good hemostasis was confirmed  The speculum was then removed  At the completion of the procedure all needle, sponge, instrument counts were noted to be correct ×2  Dr Chel Crawford was present for all key portions of the procedure      Kevin Kumar MD  7/31/2020  4:07 PM      Patient Disposition:  PACU     SIGNATURE: Kevin Kumar MD  DATE: July 31, 2020  TIME: 4:01 PM

## 2020-07-31 NOTE — ANESTHESIA PREPROCEDURE EVALUATION
Review of Systems/Medical History          Cardiovascular   Pulmonary  Smoker cigarette smoker  ,        GI/Hepatic    No GERD ,             Endo/Other     GYN       Hematology   Musculoskeletal       Neurology   Psychology   Depression ,              Physical Exam    Airway    Mallampati score: I  TM Distance: >3 FB  Neck ROM: full     Dental   No notable dental hx     Cardiovascular      Pulmonary      Other Findings        Anesthesia Plan  ASA Score- 2     Anesthesia Type- general with ASA Monitors  Additional Monitors:   Airway Plan: LMA  Plan Factors-Patient not instructed to abstain from smoking on day of procedure  Patient did not smoke on day of surgery  Induction- intravenous  Postoperative Plan-     Informed Consent- Anesthetic plan and risks discussed with patient  I personally reviewed this patient with the CRNA  Discussed and agreed on the Anesthesia Plan with the CRNA  Ila Ace

## 2020-07-31 NOTE — INTERVAL H&P NOTE
H&P reviewed  After examining the patient I find no changes in the patients condition since the H&P had been written  Post op instructions reviewed  Tylenol or Advil for pain  RTO 8/17      Vitals:    07/31/20 1409   BP: 94/66   Pulse: 80   Resp: 16   Temp: (!) 96 7 °F (35 9 °C)   SpO2: 97%

## 2020-07-31 NOTE — ANESTHESIA POSTPROCEDURE EVALUATION
Post-Op Assessment Note    CV Status:  Stable  Pain Score: 0    Pain management: adequate     Mental Status:  Alert and awake   Hydration Status:  Euvolemic   PONV Controlled:  Controlled   Airway Patency:  Patent and adequate   Post Op Vitals Reviewed: Yes      Staff: CRNA           BP (P) 106/51 (07/31/20 1611)    Temp (!) (P) 97 °F (36 1 °C) (07/31/20 1611)    Pulse (P) 79 (07/31/20 1611)   Resp (P) 18 (07/31/20 1611)    SpO2 (P) 97 % (07/31/20 1611)

## 2020-08-17 ENCOUNTER — TELEPHONE (OUTPATIENT)
Dept: OBGYN CLINIC | Facility: CLINIC | Age: 30
End: 2020-08-17

## 2020-08-17 ENCOUNTER — OFFICE VISIT (OUTPATIENT)
Dept: OBGYN CLINIC | Facility: CLINIC | Age: 30
End: 2020-08-17

## 2020-08-17 VITALS
TEMPERATURE: 97.8 F | DIASTOLIC BLOOD PRESSURE: 69 MMHG | HEART RATE: 80 BPM | WEIGHT: 185.8 LBS | SYSTOLIC BLOOD PRESSURE: 100 MMHG | BODY MASS INDEX: 29.86 KG/M2 | HEIGHT: 66 IN

## 2020-08-17 DIAGNOSIS — Z98.890 POST-OPERATIVE STATE: Primary | ICD-10-CM

## 2020-08-17 PROCEDURE — 99213 OFFICE O/P EST LOW 20 MIN: CPT | Performed by: OBSTETRICS & GYNECOLOGY

## 2020-08-17 NOTE — PROGRESS NOTES
Subjective:     Florence López is a 27 y o  [id number] female who presents for a post-op check after a cold cone biopsy on 7/31 that resulted high-grade squamous intraepithelial lesion (severe dysplasia/MESFIN 3) with endocervical gland involvement  Pathilogical report noted inked, apparent surgical margins appear negative for dysplasia  Today she complains of clear discharge with a foul odor  Denies pain or bleeding  Currently has the Verduzco for contraception  Patient states that she still highly desires hysterectomy and bilateral tubal ligation for heay, abnomal bleeding and desire for permanent sterilization  MA form signed 6/25/20  Objective:    Vitals: Blood pressure 100/69, pulse 80, temperature 97 8 °F (36 6 °C), temperature source Temporal, height 5' 6" (1 676 m), weight 84 3 kg (185 lb 12 8 oz), not currently breastfeeding  Body mass index is 29 99 kg/m²  Physical Exam  Constitutional:       Appearance: Normal appearance  She is not toxic-appearing or diaphoretic  HENT:      Head: Normocephalic and atraumatic  Genitourinary:     Vagina: Vaginal discharge present  Comments: Cervix appears normal s/p cone biopsy, no abnormal bleeding, some mild clear/yellow discharge  Skin:     General: Skin is warm and dry  Neurological:      General: No focal deficit present  Mental Status: She is alert and oriented to person, place, and time  Psychiatric:         Mood and Affect: Mood normal          Behavior: Behavior normal          Assessment/Plan:    - KOH and wet mount negative for infection; counseled that discharge is normal s/p cervical biopsy   - Patient will RTC in 2 months (to allow for recovery) and will then have a consultation and consent signed for hysterectomy and bilateral tubal ligation at that time  Explained to patient that she will then come back for an H&P once her surgery is scheduled     - MA form signed 6/25/20  - Plan for f/u pap smear of the vaginal cuff in 6 months (Mid-February) for CIN3/HGSIL, s/p cone biopsy         Yolanda Browne MD  8/17/2020  3:04 PM

## 2020-08-17 NOTE — TELEPHONE ENCOUNTER
COVID Pre-Visit Screening     1  Is this a family member screening? No  2  Have you traveled outside of your state in the past 2 weeks? No  3  Do you presently have a fever or flu-like symptoms? No  4  Do you have symptoms of an upper respiratory infection like runny nose, sore throat, or cough? No  5  Are you suffering from new headache that you have not had in the past?  No  6  Do you have/have you experienced any new shortness of breath recently? No  7  Do you have any new diarrhea, nausea or vomiting? No  8  Have you been in contact with anyone who has been sick or diagnosed with COVID-19? No  9  Do you have any new loss of taste or smell? No  10  Are you able to wear a mask without a valve for the entire visit?  Yes You can access the [facility] Patient Portal, offered by [facility], by registering with the following website: [email or web address]

## 2020-08-21 ENCOUNTER — TELEPHONE (OUTPATIENT)
Dept: OBGYN CLINIC | Facility: CLINIC | Age: 30
End: 2020-08-21

## 2020-08-21 NOTE — TELEPHONE ENCOUNTER
----- Message from Alease Goodell, MD sent at 8/17/2020  3:51 PM EDT -----  This patient was here today and scheduled a follow up appointment for a hysterectomy consult  However, she also needs to be seen for a follow up pap smear of the vaginal cuff in 6 months  Can you please call her and schedule the follow up pap now so that she doesn't get lost to follow up after she gets her hysterectomy? Thank you!

## 2020-08-26 ENCOUNTER — TELEPHONE (OUTPATIENT)
Dept: OBGYN CLINIC | Facility: CLINIC | Age: 30
End: 2020-08-26

## 2020-08-26 NOTE — TELEPHONE ENCOUNTER
----- Message from Marysol Dee MD sent at 8/17/2020  3:51 PM EDT -----  This patient was here today and scheduled a follow up appointment for a hysterectomy consult  However, she also needs to be seen for a follow up pap smear of the vaginal cuff in 6 months  Can you please call her and schedule the follow up pap now so that she doesn't get lost to follow up after she gets her hysterectomy? Thank you!

## 2020-08-31 ENCOUNTER — TELEPHONE (OUTPATIENT)
Dept: OBGYN CLINIC | Facility: CLINIC | Age: 30
End: 2020-08-31

## 2020-08-31 NOTE — TELEPHONE ENCOUNTER
----- Message from [place] MD sent at 8/17/2020  3:51 PM EDT -----  This patient was here today and scheduled a follow up appointment for a hysterectomy consult  However, she also needs to be seen for a follow up pap smear of the vaginal cuff in 6 months  Can you please call her and schedule the follow up pap now so that she doesn't get lost to follow up after she gets her hysterectomy? Thank you!

## 2020-10-27 ENCOUNTER — OFFICE VISIT (OUTPATIENT)
Dept: OBGYN CLINIC | Facility: CLINIC | Age: 30
End: 2020-10-27

## 2020-10-27 VITALS
TEMPERATURE: 97.1 F | DIASTOLIC BLOOD PRESSURE: 77 MMHG | SYSTOLIC BLOOD PRESSURE: 116 MMHG | BODY MASS INDEX: 30.86 KG/M2 | HEIGHT: 66 IN | HEART RATE: 103 BPM | WEIGHT: 192 LBS

## 2020-10-27 DIAGNOSIS — N93.9 ABNORMAL UTERINE BLEEDING (AUB): Primary | ICD-10-CM

## 2020-10-27 PROCEDURE — 99214 OFFICE O/P EST MOD 30 MIN: CPT | Performed by: OBSTETRICS & GYNECOLOGY

## 2020-10-30 ENCOUNTER — DOCUMENTATION (OUTPATIENT)
Dept: OBGYN CLINIC | Facility: CLINIC | Age: 30
End: 2020-10-30

## 2020-11-09 ENCOUNTER — TELEPHONE (OUTPATIENT)
Dept: OBGYN CLINIC | Facility: CLINIC | Age: 30
End: 2020-11-09

## 2020-11-10 ENCOUNTER — OFFICE VISIT (OUTPATIENT)
Dept: OBGYN CLINIC | Facility: CLINIC | Age: 30
End: 2020-11-10

## 2020-11-10 VITALS
HEART RATE: 97 BPM | DIASTOLIC BLOOD PRESSURE: 76 MMHG | BODY MASS INDEX: 30.6 KG/M2 | TEMPERATURE: 98.5 F | SYSTOLIC BLOOD PRESSURE: 113 MMHG | HEIGHT: 66 IN | WEIGHT: 190.4 LBS

## 2020-11-10 DIAGNOSIS — Z20.2 POSSIBLE EXPOSURE TO STD: ICD-10-CM

## 2020-11-10 DIAGNOSIS — Q63.2 PELVIC KIDNEY: ICD-10-CM

## 2020-11-10 DIAGNOSIS — N93.9 ABNORMAL UTERINE BLEEDING (AUB): Primary | ICD-10-CM

## 2020-11-10 LAB — SL AMB POCT URINE HCG: NEGATIVE

## 2020-11-10 PROCEDURE — 88305 TISSUE EXAM BY PATHOLOGIST: CPT | Performed by: PATHOLOGY

## 2020-11-10 PROCEDURE — 87491 CHLMYD TRACH DNA AMP PROBE: CPT | Performed by: OBSTETRICS & GYNECOLOGY

## 2020-11-10 PROCEDURE — 81025 URINE PREGNANCY TEST: CPT | Performed by: OBSTETRICS & GYNECOLOGY

## 2020-11-10 PROCEDURE — 87591 N.GONORRHOEAE DNA AMP PROB: CPT | Performed by: OBSTETRICS & GYNECOLOGY

## 2020-11-10 PROCEDURE — 58100 BIOPSY OF UTERUS LINING: CPT | Performed by: OBSTETRICS & GYNECOLOGY

## 2020-11-11 LAB
C TRACH DNA SPEC QL NAA+PROBE: NEGATIVE
N GONORRHOEA DNA SPEC QL NAA+PROBE: NEGATIVE

## 2020-11-13 ENCOUNTER — LAB (OUTPATIENT)
Dept: LAB | Facility: HOSPITAL | Age: 30
End: 2020-11-13
Payer: COMMERCIAL

## 2020-11-13 DIAGNOSIS — N93.9 ABNORMAL UTERINE BLEEDING (AUB): ICD-10-CM

## 2020-11-13 DIAGNOSIS — Z20.2 POSSIBLE EXPOSURE TO STD: ICD-10-CM

## 2020-11-13 LAB
ERYTHROCYTE [DISTWIDTH] IN BLOOD BY AUTOMATED COUNT: 12.3 % (ref 11.6–15.1)
HCT VFR BLD AUTO: 43.6 % (ref 34.8–46.1)
HGB BLD-MCNC: 14.5 G/DL (ref 11.5–15.4)
MCH RBC QN AUTO: 32.2 PG (ref 26.8–34.3)
MCHC RBC AUTO-ENTMCNC: 33.3 G/DL (ref 31.4–37.4)
MCV RBC AUTO: 97 FL (ref 82–98)
PLATELET # BLD AUTO: 175 THOUSANDS/UL (ref 149–390)
PMV BLD AUTO: 11.6 FL (ref 8.9–12.7)
PROLACTIN SERPL-MCNC: 4.6 NG/ML
RBC # BLD AUTO: 4.5 MILLION/UL (ref 3.81–5.12)
TSH SERPL DL<=0.05 MIU/L-ACNC: 1.23 UIU/ML (ref 0.36–3.74)
WBC # BLD AUTO: 7.78 THOUSAND/UL (ref 4.31–10.16)

## 2020-11-13 PROCEDURE — 85027 COMPLETE CBC AUTOMATED: CPT

## 2020-11-13 PROCEDURE — 86803 HEPATITIS C AB TEST: CPT

## 2020-11-13 PROCEDURE — 84443 ASSAY THYROID STIM HORMONE: CPT

## 2020-11-13 PROCEDURE — 87389 HIV-1 AG W/HIV-1&-2 AB AG IA: CPT

## 2020-11-13 PROCEDURE — 84146 ASSAY OF PROLACTIN: CPT

## 2020-11-13 PROCEDURE — 36415 COLL VENOUS BLD VENIPUNCTURE: CPT

## 2020-11-13 PROCEDURE — 86592 SYPHILIS TEST NON-TREP QUAL: CPT

## 2020-11-16 DIAGNOSIS — Q63.2 PELVIC KIDNEY: Primary | ICD-10-CM

## 2020-11-16 LAB
HIV 1+2 AB+HIV1 P24 AG SERPL QL IA: NORMAL
RPR SER QL: NORMAL

## 2020-11-17 LAB — MISCELLANEOUS LAB TEST RESULT: NORMAL

## 2020-12-02 ENCOUNTER — TELEPHONE (OUTPATIENT)
Dept: OBGYN CLINIC | Facility: CLINIC | Age: 30
End: 2020-12-02

## 2020-12-19 ENCOUNTER — HOSPITAL ENCOUNTER (OUTPATIENT)
Dept: RADIOLOGY | Facility: HOSPITAL | Age: 30
Discharge: HOME | End: 2020-12-19
Payer: COMMERCIAL

## 2020-12-19 DIAGNOSIS — Q63.2 PELVIC KIDNEY: ICD-10-CM

## 2020-12-19 PROCEDURE — A9577 INJ MULTIHANCE: HCPCS | Performed by: OBSTETRICS & GYNECOLOGY

## 2020-12-19 PROCEDURE — 72197 MRI PELVIS W/O & W/DYE: CPT

## 2020-12-19 PROCEDURE — G1004 CDSM NDSC: HCPCS

## 2020-12-19 RX ADMIN — GADOBENATE DIMEGLUMINE 8 ML: 529 INJECTION, SOLUTION INTRAVENOUS at 15:31

## 2020-12-23 ENCOUNTER — TELEPHONE (OUTPATIENT)
Dept: OBGYN CLINIC | Facility: CLINIC | Age: 30
End: 2020-12-23

## 2020-12-24 ENCOUNTER — OFFICE VISIT (OUTPATIENT)
Dept: OBGYN CLINIC | Facility: CLINIC | Age: 30
End: 2020-12-24

## 2020-12-24 VITALS
SYSTOLIC BLOOD PRESSURE: 116 MMHG | HEIGHT: 66 IN | HEART RATE: 106 BPM | BODY MASS INDEX: 29.89 KG/M2 | WEIGHT: 186 LBS | DIASTOLIC BLOOD PRESSURE: 79 MMHG

## 2020-12-24 DIAGNOSIS — N93.9 ABNORMAL UTERINE BLEEDING: Primary | ICD-10-CM

## 2020-12-24 DIAGNOSIS — R63.5 WEIGHT GAIN: ICD-10-CM

## 2020-12-24 PROBLEM — Z01.419 WOMEN'S ANNUAL ROUTINE GYNECOLOGICAL EXAMINATION: Status: RESOLVED | Noted: 2020-06-03 | Resolved: 2020-12-24

## 2020-12-24 PROBLEM — Z20.2 POSSIBLE EXPOSURE TO STD: Status: RESOLVED | Noted: 2020-06-03 | Resolved: 2020-12-24

## 2020-12-24 PROCEDURE — 99213 OFFICE O/P EST LOW 20 MIN: CPT | Performed by: STUDENT IN AN ORGANIZED HEALTH CARE EDUCATION/TRAINING PROGRAM

## 2021-01-07 DIAGNOSIS — Q63.2 PELVIC KIDNEY: Primary | ICD-10-CM

## 2021-01-07 NOTE — PROGRESS NOTES
Surgical Committee Review    Pt's case discussed with Dr Kyara Gary from [facility], she agrees with recommendation for pre-operative ureteral stenting given pelvic kidney  I attempted to call the patient to schedule her for an ambulatory referral for urology appointment, pt's phone unable to take voicemail at this time  Will task office to update pt about seeing urology  Otherwise pt will be cleared for surgery once urology has seen the patient      Aundrea Abreu,   PGY-4 OB/GYN  1/7/2021 4:15 PM

## 2021-01-15 ENCOUNTER — TELEPHONE (OUTPATIENT)
Dept: OBGYN CLINIC | Facility: CLINIC | Age: 31
End: 2021-01-15

## 2021-01-15 NOTE — TELEPHONE ENCOUNTER
Left detailed voicemail stating the surgery committee is requesting to see Urology before scheduling surgery  Please call Dr Virgie Rivas office at [phone]  If you need any assistance feel free to send us a [employer] message or call [phone]       Please call [phone] to schedule your urology appointment

## 2021-02-24 ENCOUNTER — TELEPHONE (OUTPATIENT)
Dept: OBGYN CLINIC | Facility: CLINIC | Age: 31
End: 2021-02-24

## 2021-02-24 NOTE — TELEPHONE ENCOUNTER
Left voicemail stating the surgery committee is requesting you to see Urology before scheduling your surgery  Please call Dr Gina Pulido office at [phone]  If you need any assistance feel free to send us a [employer] message or call [phone]       Please call [phone] to schedule your urology appointment

## 2021-10-28 ENCOUNTER — HOSPITAL ENCOUNTER (EMERGENCY)
Facility: HOSPITAL | Age: 31
Discharge: HOME | End: 2021-10-28
Attending: EMERGENCY MEDICINE
Payer: COMMERCIAL

## 2021-10-28 VITALS
TEMPERATURE: 98.1 F | WEIGHT: 186 LBS | SYSTOLIC BLOOD PRESSURE: 124 MMHG | OXYGEN SATURATION: 98 % | RESPIRATION RATE: 16 BRPM | BODY MASS INDEX: 30.02 KG/M2 | DIASTOLIC BLOOD PRESSURE: 72 MMHG | HEART RATE: 117 BPM

## 2021-10-28 DIAGNOSIS — H66.91 RIGHT OTITIS MEDIA: Primary | ICD-10-CM

## 2021-10-28 PROCEDURE — 99284 EMERGENCY DEPT VISIT MOD MDM: CPT | Performed by: EMERGENCY MEDICINE

## 2021-10-28 PROCEDURE — 99282 EMERGENCY DEPT VISIT SF MDM: CPT

## 2021-10-28 PROCEDURE — 69210 REMOVE IMPACTED EAR WAX UNI: CPT | Performed by: EMERGENCY MEDICINE

## 2021-10-28 RX ORDER — AMOXICILLIN AND CLAVULANATE POTASSIUM 875; 125 MG/1; MG/1
1 TABLET, FILM COATED ORAL EVERY 12 HOURS
Qty: 14 TABLET | Refills: 0 | Status: SHIPPED | OUTPATIENT
Start: 2021-10-28 | End: 2021-11-04

## 2021-10-28 RX ORDER — AMOXICILLIN AND CLAVULANATE POTASSIUM 875; 125 MG/1; MG/1
1 TABLET, FILM COATED ORAL ONCE
Status: COMPLETED | OUTPATIENT
Start: 2021-10-28 | End: 2021-10-28

## 2021-10-28 RX ADMIN — AMOXICILLIN AND CLAVULANATE POTASSIUM 1 TABLET: 875; 125 TABLET, FILM COATED ORAL at 21:57

## 2022-01-31 ENCOUNTER — OFFICE VISIT (OUTPATIENT)
Dept: FAMILY MEDICINE CLINIC | Facility: CLINIC | Age: 32
End: 2022-01-31

## 2022-01-31 VITALS
SYSTOLIC BLOOD PRESSURE: 126 MMHG | HEIGHT: 66 IN | BODY MASS INDEX: 29.25 KG/M2 | DIASTOLIC BLOOD PRESSURE: 76 MMHG | TEMPERATURE: 98.1 F | HEART RATE: 100 BPM | WEIGHT: 182 LBS | OXYGEN SATURATION: 100 %

## 2022-01-31 DIAGNOSIS — F41.1 GAD (GENERALIZED ANXIETY DISORDER): Primary | ICD-10-CM

## 2022-01-31 DIAGNOSIS — F32.1 CURRENT MODERATE EPISODE OF MAJOR DEPRESSIVE DISORDER, UNSPECIFIED WHETHER RECURRENT (HCC): ICD-10-CM

## 2022-01-31 DIAGNOSIS — F51.02 ADJUSTMENT INSOMNIA: ICD-10-CM

## 2022-01-31 DIAGNOSIS — Z13.6 SCREENING FOR CARDIOVASCULAR CONDITION: ICD-10-CM

## 2022-01-31 PROBLEM — F32.9 MAJOR DEPRESSIVE DISORDER: Status: ACTIVE | Noted: 2020-06-03

## 2022-01-31 PROCEDURE — 99213 OFFICE O/P EST LOW 20 MIN: CPT | Performed by: FAMILY MEDICINE

## 2022-01-31 RX ORDER — RAMELTEON 8 MG/1
8 TABLET ORAL
Qty: 30 TABLET | Refills: 0 | Status: SHIPPED | OUTPATIENT
Start: 2022-01-31 | End: 2022-07-07

## 2022-01-31 RX ORDER — HYDROXYZINE HYDROCHLORIDE 25 MG/1
25 TABLET, FILM COATED ORAL EVERY 6 HOURS PRN
Qty: 30 TABLET | Refills: 0 | Status: SHIPPED | OUTPATIENT
Start: 2022-01-31 | End: 2022-02-28

## 2022-01-31 RX ORDER — SERTRALINE HYDROCHLORIDE 25 MG/1
25 TABLET, FILM COATED ORAL DAILY
Qty: 30 TABLET | Refills: 0 | Status: SHIPPED | OUTPATIENT
Start: 2022-01-31 | End: 2022-02-22

## 2022-01-31 NOTE — ASSESSMENT & PLAN NOTE
PHQ 9 is 18  Depression Screening Follow-up Plan: Patient's depression screening was positive with a PHQ-2 score of 5  Their PHQ-9 score was 18  Patient assessed for underlying major depression  They have no active suicidal ideations  Brief counseling provided and recommend additional follow-up/re-evaluation next office visit  Started Zoloft 25mg daily  Will f/u in 4 wks

## 2022-01-31 NOTE — ASSESSMENT & PLAN NOTE
Not new but significantly worse since losing sister to suicide on 01/06/2022  RADHA-7 score today 18 indicating severe anxiety  Patient appears restless with mild tremors on exam, denies illicit drug use  Previously on Prozac but self-d/c'd due to nightmares  Start Zoloft 25 mg daily  Start atarax 25mg Q6 prn for acute anxiety  Will f/u in 4 wks

## 2022-01-31 NOTE — ASSESSMENT & PLAN NOTE
Lost her sister on 01/06 from suicide, has been unable to fall and stay asleep since then  Counseled on sleep hygiene  Has tried melatonin wo relief  Prescribed Ramelteon 8 mg qHS

## 2022-01-31 NOTE — PROGRESS NOTES
OFFICE VISIT NOTE - [address] 32 y o  ([id number]) female MRN: [id number]  Unit/Bed#:  Encounter: [id number]      Date: 01/31/22    Assessment and Plan     Adjustment insomnia  Lost her sister on 01/06 from suicide, has been unable to fall and stay asleep since then  Counseled on sleep hygiene  Has tried melatonin wo relief  Prescribed Ramelteon 8 mg qHS    RADHA (generalized anxiety disorder)  Not new but significantly worse since losing sister to suicide on 01/06/2022  RADHA-7 score today 18 indicating severe anxiety  Patient appears restless with mild tremors on exam, denies illicit drug use  Previously on Prozac but self-d/c'd due to nightmares  Start Zoloft 25 mg daily  Start atarax 25mg Q6 prn for acute anxiety  Will f/u in 4 wks    Major depressive disorder  PHQ 9 is 18  Depression Screening Follow-up Plan: Patient's depression screening was positive with a PHQ-2 score of 5  Their PHQ-9 score was 18  Patient assessed for underlying major depression  They have no active suicidal ideations  Brief counseling provided and recommend additional follow-up/re-evaluation next office visit  Started Zoloft 25mg daily  Will f/u in 4 wks  Diagnoses and all orders for this visit:    RADHA (generalized anxiety disorder)  -     TSH, 3rd generation with Free T4 reflex; Future  -     sertraline (Zoloft) 25 mg tablet; Take 1 tablet (25 mg total) by mouth daily  -     ramelteon (ROZEREM) 8 mg tablet; Take 1 tablet (8 mg total) by mouth daily at bedtime  -     hydrOXYzine HCL (ATARAX) 25 mg tablet; Take 1 tablet (25 mg total) by mouth every 6 (six) hours as needed for anxiety    Adjustment insomnia  -     hydrOXYzine HCL (ATARAX) 25 mg tablet;  Take 1 tablet (25 mg total) by mouth every 6 (six) hours as needed for anxiety    Current moderate episode of major depressive disorder, unspecified whether recurrent ([facility] )    Screening for cardiovascular condition  -     Lipid panel; Future  -     Hemoglobin A1C; Future  -     CBC; Future  -     Basic metabolic panel; Future  -     TSH, 3rd generation with Free T4 reflex; Future        Subjective:  Chief Complaint   Patient presents with    [facility]     Patient loss her sister by suicide on 1/3/22 and has been seen phs for the depression but nothing is helping on her  Patient complaint of Left shoulder, neck and arm pain and chest too  History of Present Illness     Roy Apgar is a 32 y o  female with history significant for abnormal uterine bleeding, pelvic kidney, anxiety and depression here to establish care  Patient reports she has not seen a doctor since   She has a family history of diabetes and so has been checking her fasting blood sugar  Her FBG was 140 this am, and 146 on Friday (both concerning for diagnosis of diabetes)  Patient reports that she used to follow psychiatrist for depression, anxiety assoc with panic attacks and was on Prozac but self discontinued abruptly due to nightmares  Patient reports that she lost her sister to suicide on 2022  Since then, She Admits that she has been having panic attacks more frequently  Most recent episode was this am 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) during which she was sweating more profusely, palpitations, tremors on both upper and LEs  She feels anxious reports that it affects her sleep  Has trouble falling and staying asleep  Also, her Appetite has reduced, eating once a day  Noted to have lost about 4 lbs since last visit  She quit smoking about a year ago but has restarted the habit this year after the death of her sister but smokes about 2-3 cigarettes per day  She denies illicit drug use        PHQ-2/9 Depression Screening    Little interest or pleasure in doing things: 2 - more than half the days  Feeling down, depressed, or hopeless: 3 - nearly every day  Trouble falling or staying asleep, or sleeping too much: 3 - nearly every day  Feeling tired or having little energy: 2 - more than half the days  Poor appetite or overeatin - more than half the days  Feeling bad about yourself - or that you are a failure or have let yourself or your family down: 2 - more than half the days  Trouble concentrating on things, such as reading the newspaper or watching television: 2 - more than half the days  Moving or speaking so slowly that other people could have noticed  Or the opposite - being so fidgety or restless that you have been moving around a lot more than usual: 2 - more than half the days  Thoughts that you would be better off dead, or of hurting yourself in some way: 0 - not at all  PHQ-2 Score: 5  PHQ-2 Interpretation: POSITIVE depression screen  PHQ-9 Score: 18   PHQ-9 Interpretation: Moderately severe depression        RADHA-7 Flowsheet Screening      Most Recent Value   Over the last 2 weeks, how often have you been bothered by any of the following problems? Feeling nervous, anxious, or on edge 3   Not being able to stop or control worrying 2   Worrying too much about different things 3   Trouble relaxing 3   Being so restless that it is hard to sit still 1   Becoming easily annoyed or irritable 3   Feeling afraid as if something awful might happen 3   RADHA-7 Total Score 18          The following portions of the patient's history were reviewed and updated as appropriate: allergies, current medications, past family history, past medical history, past social history, past surgical history and problem list     Review of Systems     Review of Systems   Constitutional: Negative for fatigue and fever  HENT: Negative for sore throat  Respiratory: Positive for chest tightness  Negative for cough, shortness of breath and wheezing  Cardiovascular: Positive for palpitations  Negative for chest pain and leg swelling  Gastrointestinal: Negative for abdominal pain, diarrhea, nausea and vomiting     Genitourinary: Negative for dysuria and pelvic pain  Skin: Negative for rash  Neurological: Positive for light-headedness and headaches  Negative for weakness  Psychiatric/Behavioral: Positive for agitation and sleep disturbance  Negative for hallucinations, self-injury and suicidal ideas  The patient is nervous/anxious  Current Medications     Current Outpatient Medications on File Prior to Visit   Medication Sig Dispense Refill    [DISCONTINUED] FLUoxetine (PROzac) 20 MG tablet Take 20 mg by mouth daily      [DISCONTINUED] traZODone (DESYREL) 50 mg tablet Take 50 mg by mouth daily at bedtime       No current facility-administered medications on file prior to visit  Objective     Vitals: /76 (BP Location: Left arm, Patient Position: Sitting, Cuff Size: Standard)   Pulse 100   Temp 98 1 °F (36 7 °C) (Temporal)   Ht 5' 5 6" (1 666 m)   Wt 82 6 kg (182 lb)   SpO2 100%   BMI 29 73 kg/m²       Physical Exam  Constitutional:       General: She is not in acute distress  Appearance: Normal appearance  She is not ill-appearing, toxic-appearing or diaphoretic  HENT:      Right Ear: External ear normal       Left Ear: External ear normal       Mouth/Throat:      Mouth: Mucous membranes are moist    Eyes:      General: No visual field deficit  Extraocular Movements: Extraocular movements intact  Conjunctiva/sclera: Conjunctivae normal       Pupils: Pupils are equal, round, and reactive to light  Cardiovascular:      Rate and Rhythm: Normal rate and regular rhythm  Heart sounds: Normal heart sounds  No murmur heard  Pulmonary:      Effort: No respiratory distress  Breath sounds: Normal breath sounds  Chest:      Chest wall: No tenderness  Abdominal:      Palpations: Abdomen is soft  Neurological:      General: No focal deficit present  Mental Status: She is alert and oriented to person, place, and time  Cranial Nerves: No facial asymmetry        Sensory: No sensory deficit  Motor: Tremor present  No weakness  Psychiatric:         Attention and Perception: Attention normal          Mood and Affect: Mood is anxious  Speech: Speech is rapid and pressured  Behavior: Behavior is agitated  Behavior is not slowed or hyperactive  Behavior is cooperative           Tona Rolon MD  Family Medicine PGY-2  01/31/22

## 2022-02-03 ENCOUNTER — TELEPHONE (OUTPATIENT)
Dept: FAMILY MEDICINE CLINIC | Facility: CLINIC | Age: 32
End: 2022-02-03

## 2022-02-05 ENCOUNTER — APPOINTMENT (OUTPATIENT)
Dept: LAB | Facility: HOSPITAL | Age: 32
End: 2022-02-05
Payer: MEDICARE

## 2022-02-05 DIAGNOSIS — F41.1 GAD (GENERALIZED ANXIETY DISORDER): ICD-10-CM

## 2022-02-05 DIAGNOSIS — Z13.6 SCREENING FOR CARDIOVASCULAR CONDITION: ICD-10-CM

## 2022-02-05 LAB
ANION GAP SERPL CALCULATED.3IONS-SCNC: 3 MMOL/L (ref 4–13)
BUN SERPL-MCNC: 11 MG/DL (ref 5–25)
CALCIUM SERPL-MCNC: 9.7 MG/DL (ref 8.3–10.1)
CHLORIDE SERPL-SCNC: 109 MMOL/L (ref 100–108)
CHOLEST SERPL-MCNC: 220 MG/DL
CO2 SERPL-SCNC: 28 MMOL/L (ref 21–32)
CREAT SERPL-MCNC: 0.84 MG/DL (ref 0.6–1.3)
ERYTHROCYTE [DISTWIDTH] IN BLOOD BY AUTOMATED COUNT: 12.2 % (ref 11.6–15.1)
EST. AVERAGE GLUCOSE BLD GHB EST-MCNC: 105 MG/DL
GFR SERPL CREATININE-BSD FRML MDRD: 92 ML/MIN/1.73SQ M
GLUCOSE P FAST SERPL-MCNC: 94 MG/DL (ref 65–99)
HBA1C MFR BLD: 5.3 %
HCT VFR BLD AUTO: 45.1 % (ref 34.8–46.1)
HDLC SERPL-MCNC: 34 MG/DL
HGB BLD-MCNC: 14.8 G/DL (ref 11.5–15.4)
LDLC SERPL CALC-MCNC: 153 MG/DL (ref 0–100)
MCH RBC QN AUTO: 31.5 PG (ref 26.8–34.3)
MCHC RBC AUTO-ENTMCNC: 32.8 G/DL (ref 31.4–37.4)
MCV RBC AUTO: 96 FL (ref 82–98)
NONHDLC SERPL-MCNC: 186 MG/DL
PLATELET # BLD AUTO: 220 THOUSANDS/UL (ref 149–390)
PMV BLD AUTO: 11.3 FL (ref 8.9–12.7)
POTASSIUM SERPL-SCNC: 3.9 MMOL/L (ref 3.5–5.3)
RBC # BLD AUTO: 4.7 MILLION/UL (ref 3.81–5.12)
SODIUM SERPL-SCNC: 140 MMOL/L (ref 136–145)
TRIGL SERPL-MCNC: 167 MG/DL
TSH SERPL DL<=0.05 MIU/L-ACNC: 1.4 UIU/ML (ref 0.36–3.74)
WBC # BLD AUTO: 7.92 THOUSAND/UL (ref 4.31–10.16)

## 2022-02-05 PROCEDURE — 36415 COLL VENOUS BLD VENIPUNCTURE: CPT

## 2022-02-05 PROCEDURE — 83036 HEMOGLOBIN GLYCOSYLATED A1C: CPT

## 2022-02-05 PROCEDURE — 80048 BASIC METABOLIC PNL TOTAL CA: CPT

## 2022-02-05 PROCEDURE — 80061 LIPID PANEL: CPT

## 2022-02-05 PROCEDURE — 85027 COMPLETE CBC AUTOMATED: CPT

## 2022-02-05 PROCEDURE — 84443 ASSAY THYROID STIM HORMONE: CPT

## 2022-02-22 DIAGNOSIS — F41.1 GAD (GENERALIZED ANXIETY DISORDER): ICD-10-CM

## 2022-02-22 RX ORDER — SERTRALINE HYDROCHLORIDE 25 MG/1
25 TABLET, FILM COATED ORAL DAILY
Qty: 30 TABLET | Refills: 0 | Status: SHIPPED | OUTPATIENT
Start: 2022-02-22 | End: 2022-03-07 | Stop reason: SDUPTHER

## 2022-02-25 DIAGNOSIS — F41.1 GAD (GENERALIZED ANXIETY DISORDER): ICD-10-CM

## 2022-02-25 DIAGNOSIS — F51.02 ADJUSTMENT INSOMNIA: ICD-10-CM

## 2022-02-28 RX ORDER — HYDROXYZINE HYDROCHLORIDE 25 MG/1
TABLET, FILM COATED ORAL
Qty: 30 TABLET | Refills: 0 | Status: SHIPPED | OUTPATIENT
Start: 2022-02-28 | End: 2022-03-07 | Stop reason: SDUPTHER

## 2022-03-07 ENCOUNTER — OFFICE VISIT (OUTPATIENT)
Dept: FAMILY MEDICINE CLINIC | Facility: CLINIC | Age: 32
End: 2022-03-07

## 2022-03-07 VITALS
HEIGHT: 66 IN | TEMPERATURE: 98.4 F | HEART RATE: 107 BPM | BODY MASS INDEX: 28.22 KG/M2 | DIASTOLIC BLOOD PRESSURE: 80 MMHG | OXYGEN SATURATION: 98 % | RESPIRATION RATE: 18 BRPM | WEIGHT: 175.6 LBS | SYSTOLIC BLOOD PRESSURE: 120 MMHG

## 2022-03-07 DIAGNOSIS — F41.1 GAD (GENERALIZED ANXIETY DISORDER): ICD-10-CM

## 2022-03-07 PROBLEM — E66.3 OVERWEIGHT (BMI 25.0-29.9): Status: ACTIVE | Noted: 2022-03-07

## 2022-03-07 PROCEDURE — 99213 OFFICE O/P EST LOW 20 MIN: CPT | Performed by: FAMILY MEDICINE

## 2022-03-07 RX ORDER — HYDROXYZINE HYDROCHLORIDE 25 MG/1
25 TABLET, FILM COATED ORAL EVERY 6 HOURS PRN
Qty: 30 TABLET | Refills: 0 | Status: SHIPPED | OUTPATIENT
Start: 2022-03-07 | End: 2022-04-11

## 2022-03-07 RX ORDER — ALBUTEROL SULFATE 90 UG/1
AEROSOL, METERED RESPIRATORY (INHALATION)
Status: ON HOLD | COMMUNITY
Start: 2022-02-28 | End: 2022-08-04 | Stop reason: SDUPTHER

## 2022-03-07 RX ORDER — ALBUTEROL SULFATE 90 UG/1
2 AEROSOL, METERED RESPIRATORY (INHALATION) EVERY 6 HOURS PRN
COMMUNITY
Start: 2022-02-28 | End: 2022-03-10

## 2022-03-07 RX ORDER — SERTRALINE HYDROCHLORIDE 25 MG/1
50 TABLET, FILM COATED ORAL
Qty: 30 TABLET | Refills: 0 | Status: SHIPPED | OUTPATIENT
Start: 2022-03-07 | End: 2022-03-11 | Stop reason: SDUPTHER

## 2022-03-07 RX ORDER — ONDANSETRON 4 MG/1
TABLET, ORALLY DISINTEGRATING ORAL
COMMUNITY
Start: 2022-02-28

## 2022-03-07 NOTE — ASSESSMENT & PLAN NOTE
Has lost 7 lbs since last visit on 1/31/22, patient affirmed  BMI Counseling: Body mass index is 28 69 kg/m²  The BMI is above normal  Nutrition recommendations include decreasing overall calorie intake, 3-5 servings of fruits/vegetables daily, decreasing soda and/or juice intake and increasing intake of lean protein  Exercise recommendations include moderate aerobic physical activity for 150 minutes/week, exercising 3-5 times per week and strength training exercises

## 2022-03-07 NOTE — ASSESSMENT & PLAN NOTE
Gradually improving on Zoloft 25 (started 4 weeks ago) but still having panic attacks from time to time  Has been on atarax as needed since then  Will increase dose of Zoloft to 50mg daily and f/u in 4 weeks  Will repeat RADHA-7 screen at that time

## 2022-03-07 NOTE — PROGRESS NOTES
OFFICE VISIT NOTE - [address] 32 y o  ([id number]) female MRN: [id number]  Unit/Bed#:  Encounter: [id number]      Date: 03/07/22    Assessment and Plan     RADHA (generalized anxiety disorder)  Gradually improving on Zoloft 25 (started 4 weeks ago) but still having panic attacks from time to time  Has been on atarax as needed since then  Will increase dose of Zoloft to 50mg daily and f/u in 4 weeks  Will repeat RADHA-7 screen at that time    Overweight (BMI 25 0-29  9)  Has lost 7 lbs since last visit on 1/31/22, patient affirmed  BMI Counseling: Body mass index is 28 69 kg/m²  The BMI is above normal  Nutrition recommendations include decreasing overall calorie intake, 3-5 servings of fruits/vegetables daily, decreasing soda and/or juice intake and increasing intake of lean protein  Exercise recommendations include moderate aerobic physical activity for 150 minutes/week, exercising 3-5 times per week and strength training exercises  Diagnoses and all orders for this visit:    RADHA (generalized anxiety disorder)  -     sertraline (ZOLOFT) 25 mg tablet; Take 2 tablets (50 mg total) by mouth daily at bedtime  -     hydrOXYzine HCL (ATARAX) 25 mg tablet; Take 1 tablet (25 mg total) by mouth every 6 (six) hours as needed for anxiety    Other orders  -     albuterol (PROVENTIL HFA,VENTOLIN HFA) 90 mcg/act inhaler;  Inhale 2 puffs every 6 (six) hours as needed (Patient not taking: Reported on 3/7/2022 )  -     albuterol (PROVENTIL HFA,VENTOLIN HFA) 90 mcg/act inhaler; TAKE 2 PUFFS BY MOUTH EVERY 6 HOURS AS NEEDED FOR WHEEZE OR FOR SHORTNESS OF BREATH  -     ondansetron (ZOFRAN-ODT) 4 mg disintegrating tablet; TAKE 1 TABLET BY MOUTH EVERY 8 HOURS AS NEEDED FOR NAUSEA AND VOMITING (Patient not taking: Reported on 3/7/2022)        Subjective:  Chief Complaint   Patient presents with   Sonya Ramirez results Follow up       History of Present Illness     Rose Summers Teena North is a 32 y o  female following up for RADHA and recent blood work after recently being started on Zoloft 25mg on 1/31/2022 for anxiety  During that visit, patient was initially evaluated for anxiety associated with panic attacks and was noted to have a RADHA-7 score of 18 indicating severe anxiety  At that time, patient also reported losing her sister to suicide a few wks prior  Today, she reports no issues with Zoloft  However, continues to have panic attacks from time to time  She denies chest pain, nausea, vomiting, rash, abdominal pain or other new symptoms  Recent blood work was also reviewed with patient  CBC, CMP, TSH were wnl  Hypercholesterolemia noted on lipid panel  Patient reports she has significantly improved her physical activity and will begin strength training exercises  She also reports she has cut out excess carbohydrates from her diet and now consumes more lean protein  Of note, she has lost 7lbs since last visit and was congratulated for this  The following portions of the patient's history were reviewed and updated as appropriate: allergies, current medications, past family history, past medical history, past social history, past surgical history and problem list     Review of Systems     Review of Systems   Constitutional: Negative for fatigue and fever  HENT: Negative for sore throat  Respiratory: Negative for cough, shortness of breath and wheezing  Cardiovascular: Negative for chest pain, palpitations and leg swelling  Gastrointestinal: Negative for abdominal pain, diarrhea, nausea and vomiting  Skin: Negative for rash       Current Medications     Current Outpatient Medications on File Prior to Visit   Medication Sig Dispense Refill    albuterol (PROVENTIL HFA,VENTOLIN HFA) 90 mcg/act inhaler TAKE 2 PUFFS BY MOUTH EVERY 6 HOURS AS NEEDED FOR WHEEZE OR FOR SHORTNESS OF BREATH      [DISCONTINUED] hydrOXYzine HCL (ATARAX) 25 mg tablet TAKE 1 TABLET BY MOUTH EVERY 6 HOURS AS NEEDED FOR ANXIETY  30 tablet 0    [DISCONTINUED] sertraline (ZOLOFT) 25 mg tablet TAKE 1 TABLET (25 MG TOTAL) BY MOUTH DAILY  30 tablet 0    albuterol (PROVENTIL HFA,VENTOLIN HFA) 90 mcg/act inhaler Inhale 2 puffs every 6 (six) hours as needed (Patient not taking: Reported on 3/7/2022 )      ondansetron (ZOFRAN-ODT) 4 mg disintegrating tablet TAKE 1 TABLET BY MOUTH EVERY 8 HOURS AS NEEDED FOR NAUSEA AND VOMITING (Patient not taking: Reported on 3/7/2022)      ramelteon (ROZEREM) 8 mg tablet Take 1 tablet (8 mg total) by mouth daily at bedtime 30 tablet 0     No current facility-administered medications on file prior to visit  Objective     Vitals: /80 (BP Location: Left arm, Patient Position: Sitting, Cuff Size: Standard)   Pulse (!) 107   Temp 98 4 °F (36 9 °C) (Temporal)   Resp 18   Ht 5' 5 6" (1 666 m)   Wt 79 7 kg (175 lb 9 6 oz)   SpO2 98%   BMI 28 69 kg/m²       Physical Exam  Constitutional:       General: She is not in acute distress  Appearance: Normal appearance  She is not ill-appearing, toxic-appearing or diaphoretic  HENT:      Right Ear: External ear normal       Left Ear: External ear normal       Mouth/Throat:      Mouth: Mucous membranes are moist    Eyes:      Conjunctiva/sclera: Conjunctivae normal    Cardiovascular:      Rate and Rhythm: Normal rate and regular rhythm  Heart sounds: Normal heart sounds  Pulmonary:      Effort: Pulmonary effort is normal  No respiratory distress  Breath sounds: Normal breath sounds           Yamilex Zaragoza MD  Family Medicine PGY-2  03/07/22

## 2022-03-08 DIAGNOSIS — F41.1 GAD (GENERALIZED ANXIETY DISORDER): ICD-10-CM

## 2022-03-08 RX ORDER — SERTRALINE HYDROCHLORIDE 25 MG/1
50 TABLET, FILM COATED ORAL
Qty: 30 TABLET | Refills: 0 | OUTPATIENT
Start: 2022-03-08

## 2022-03-09 ENCOUNTER — TELEPHONE (OUTPATIENT)
Dept: FAMILY MEDICINE CLINIC | Facility: CLINIC | Age: 32
End: 2022-03-09

## 2022-03-09 DIAGNOSIS — F41.1 GAD (GENERALIZED ANXIETY DISORDER): ICD-10-CM

## 2022-03-09 RX ORDER — SERTRALINE HYDROCHLORIDE 25 MG/1
50 TABLET, FILM COATED ORAL
Qty: 30 TABLET | Refills: 0 | OUTPATIENT
Start: 2022-03-09

## 2022-03-09 NOTE — TELEPHONE ENCOUNTER
Received a call from the pharmacy regarding her medication refill for Zoloft 25 mg tab, Take 2 tabs(50 mg) daily by mouth daily  Per pharmacist insurance dont want to pay for 2 tabs but 1 tab 50 mg tab by mouth daily insurance pay that  Can you please placed a new order?    Thanks Dr Randee Jacobson

## 2022-04-09 DIAGNOSIS — F41.1 GAD (GENERALIZED ANXIETY DISORDER): ICD-10-CM

## 2022-04-11 RX ORDER — HYDROXYZINE HYDROCHLORIDE 25 MG/1
TABLET, FILM COATED ORAL
Qty: 30 TABLET | Refills: 0 | Status: SHIPPED | OUTPATIENT
Start: 2022-04-11 | End: 2022-07-11

## 2022-04-15 ENCOUNTER — OFFICE VISIT (OUTPATIENT)
Dept: FAMILY MEDICINE CLINIC | Facility: CLINIC | Age: 32
End: 2022-04-15

## 2022-04-15 VITALS
TEMPERATURE: 97.8 F | SYSTOLIC BLOOD PRESSURE: 126 MMHG | HEIGHT: 66 IN | BODY MASS INDEX: 28.73 KG/M2 | WEIGHT: 178.8 LBS | HEART RATE: 100 BPM | DIASTOLIC BLOOD PRESSURE: 100 MMHG | RESPIRATION RATE: 18 BRPM | OXYGEN SATURATION: 99 %

## 2022-04-15 DIAGNOSIS — G44.209 TENSION HEADACHE: Primary | ICD-10-CM

## 2022-04-15 DIAGNOSIS — F41.1 GAD (GENERALIZED ANXIETY DISORDER): ICD-10-CM

## 2022-04-15 PROCEDURE — 99213 OFFICE O/P EST LOW 20 MIN: CPT | Performed by: FAMILY MEDICINE

## 2022-04-15 RX ORDER — PROPRANOLOL HYDROCHLORIDE 10 MG/1
10 TABLET ORAL 3 TIMES DAILY
Qty: 90 TABLET | Refills: 0 | Status: SHIPPED | OUTPATIENT
Start: 2022-04-15 | End: 2022-05-11

## 2022-04-15 RX ORDER — SERTRALINE HYDROCHLORIDE 100 MG/1
100 TABLET, FILM COATED ORAL
Qty: 90 TABLET | Refills: 2 | Status: SHIPPED | OUTPATIENT
Start: 2022-04-15 | End: 2022-07-07

## 2022-04-15 NOTE — ASSESSMENT & PLAN NOTE
GAD7 - 15, increased zoloft from 25mg to 50mg last visit in 03/2022   - increase zoloft to 100mg at bedtime  - reviewed mindfulness techniques  - added propanolol (see tension headache plan)

## 2022-04-15 NOTE — PROGRESS NOTES
Assessment/Plan:    Tension headache  The patient is presenting with 2 week history of frontotemporal tension headache worsened by stress, not alleviated with increased hydration, Tylenol round the clock, Excedrin or NSAIDs  Patient states she has homeless for the past week in lost her sister approximately 2 months ago  Patient also has 4 young children who she takes her herself with very little support from the father in family     -the patient exhibits a high RADHA score of 15 and PHQ-9 score of 13  -patient is already taking Zoloft 50 milligrams at bedtime, increase to 100 milligram tablet daily   -prescribed propranolol 10 milligrams t i d  to help with stressors  -prescribed magnesium 400 milligrams b i d  to alleviate headaches  -f/u 4 weeks    RADHA (generalized anxiety disorder)  GAD7 - 15, increased zoloft from 25mg to 50mg last visit in 03/2022   - increase zoloft to 100mg at bedtime  - reviewed mindfulness techniques  - added propanolol (see tension headache plan)         Problem List Items Addressed This Visit        Other    RADHA (generalized anxiety disorder)     GAD7 - 15, increased zoloft from 25mg to 50mg last visit in 03/2022   - increase zoloft to 100mg at bedtime  - reviewed mindfulness techniques  - added propanolol (see tension headache plan)         Relevant Medications    sertraline (ZOLOFT) 100 mg tablet    propranolol (INDERAL) 10 mg tablet    Tension headache - Primary     The patient is presenting with 2 week history of frontotemporal tension headache worsened by stress, not alleviated with increased hydration, Tylenol round the clock, Excedrin or NSAIDs  Patient states she has homeless for the past week in lost her sister approximately 2 months ago    Patient also has 4 young children who she takes her herself with very little support from the father in family     -the patient exhibits a high RADHA score of 15 and PHQ-9 score of 13  -patient is already taking Zoloft 50 milligrams at bedtime, increase to 100 milligram tablet daily   -prescribed propranolol 10 milligrams t i d  to help with stressors  -prescribed magnesium 400 milligrams b i d  to alleviate headaches  -f/u 4 weeks         Relevant Medications    sertraline (ZOLOFT) 100 mg tablet    propranolol (INDERAL) 10 mg tablet    magnesium oxide (MAG-OX) 400 mg            Subjective:      Patient ID: Norma Reardon is a 28 y o  female  Headache   This is a new problem  The current episode started 1 to 4 weeks ago (2 weeks ago)  The problem occurs daily  The problem has been gradually worsening  The pain is located in the bilateral, frontal and temporal region  The pain does not radiate  The pain quality is not similar to prior headaches  The quality of the pain is described as aching, pulsating, sharp, squeezing and thunderclap  The pain is at a severity of 7/10  The pain is moderate  Pertinent negatives include no abdominal pain, abnormal behavior, back pain, blurred vision, coughing, dizziness, ear pain, eye pain, facial sweating, fever, loss of balance, muscle aches, nausea, neck pain, numbness, phonophobia, photophobia, rhinorrhea, scalp tenderness, seizures, sore throat, tingling, visual change, vomiting, weakness or weight loss  The symptoms are aggravated by emotional stress, activity and fatigue  She has tried acetaminophen, antidepressants, Excedrin, NSAIDs and darkened room for the symptoms  The treatment provided mild relief  There is no history of cluster headaches, hypertension, migraine headaches, migraines in the family, obesity, recent head traumas, sinus disease or TMJ  The following portions of the patient's history were reviewed and updated as appropriate: allergies, current medications, past family history, past medical history, past social history, past surgical history and problem list     Review of Systems   Constitutional: Negative for chills, fever and weight loss     HENT: Negative for ear pain, rhinorrhea and sore throat  Eyes: Negative for blurred vision, photophobia, pain and visual disturbance  Respiratory: Negative for cough and shortness of breath  Cardiovascular: Negative for chest pain and palpitations  Gastrointestinal: Negative for abdominal pain, nausea and vomiting  Genitourinary: Negative for dysuria and hematuria  Musculoskeletal: Negative for arthralgias, back pain and neck pain  Skin: Negative for color change and rash  Neurological: Positive for headaches  Negative for dizziness, tingling, seizures, syncope, weakness, numbness and loss of balance  Psychiatric/Behavioral: Positive for dysphoric mood and sleep disturbance  The patient is nervous/anxious  Depressed   All other systems reviewed and are negative  Objective:      /100 (BP Location: Left arm, Patient Position: Sitting, Cuff Size: Standard)   Pulse 100   Temp 97 8 °F (36 6 °C) (Temporal)   Resp 18   Ht 5' 5 6" (1 666 m)   Wt 81 1 kg (178 lb 12 8 oz)   SpO2 99%   BMI 29 21 kg/m²          Physical Exam  Vitals reviewed  Constitutional:       General: She is not in acute distress  Appearance: Normal appearance  She is not ill-appearing or diaphoretic  HENT:      Head: Normocephalic and atraumatic  Nose: Nose normal  No congestion or rhinorrhea  Mouth/Throat:      Mouth: Mucous membranes are moist       Pharynx: Oropharynx is clear  No oropharyngeal exudate  Eyes:      General: No scleral icterus  Conjunctiva/sclera: Conjunctivae normal       Pupils: Pupils are equal, round, and reactive to light  Cardiovascular:      Rate and Rhythm: Normal rate and regular rhythm  Pulses: Normal pulses  Heart sounds: No murmur heard  Pulmonary:      Effort: Pulmonary effort is normal       Breath sounds: Normal breath sounds  No wheezing  Chest:      Chest wall: No tenderness  Abdominal:      General: Bowel sounds are normal       Palpations: Abdomen is soft   There is no mass       Tenderness: There is no abdominal tenderness  Musculoskeletal:         General: No tenderness  Normal range of motion  Cervical back: Normal range of motion  Skin:     General: Skin is warm  Capillary Refill: Capillary refill takes less than 2 seconds  Findings: No bruising or rash  Neurological:      Mental Status: She is alert and oriented to person, place, and time  Motor: No weakness  Gait: Gait normal    Psychiatric:         Mood and Affect: Mood normal          Behavior: Behavior normal       Comments: Tearful during encounter            Karina Griffith DO  Family Medicine Resident, PGY2  4:07 PM

## 2022-04-15 NOTE — PATIENT INSTRUCTIONS
Tension Headache   WHAT YOU NEED TO KNOW:   What do I need to know about tension headaches? Tension headaches are often caused by tense head or neck muscles  The headache can last anywhere from 30 minutes to several days  Tension headaches usually do not cause any serious problems  What causes a tension headache? The following can cause muscle tension and trigger a tension headache:  · Stress or anxiety    · Eye strain or poor posture, such as from using a computer    · Jaw or dental problems such as temporomandibular joint (TMJ), clenching your jaw, or grinding your teeth    · Activities that cause your head to be held in one position for too long    · Skipping a meal    · Not enough sleep, or sleep apnea (brief periods of not breathing during sleep)    · Food sensitivities, such as to gluten    · Hormone changes (women), such as before or during your monthly period, or at menopause    What are the symptoms of a tension headache? · Dull, constant pain above your eyes and across the back of your head    · Head pain that gets worse as the day goes on    · Pain that may spread over your entire head and to your neck and shoulders    · Tight neck or shoulder muscles    · Head pain that is made worse by bright lights or loud noises    How is a tension headache diagnosed and treated? Your healthcare provider will examine you  Tell your provider about other health conditions you have and medicine you take  You may need any of the following to treat a tension headache:  · NSAIDs , such as ibuprofen, help decrease swelling, pain, and fever  This medicine is available with or without a doctor's order  NSAIDs can cause stomach bleeding or kidney problems in certain people  If you take blood thinner medicine, always ask your healthcare provider if NSAIDs are safe for you  Always read the medicine label and follow directions  · Acetaminophen  decreases pain and fever  It is available without a doctor's order   Ask how much to take and how often to take it  Follow directions  Read the labels of all other medicines you are using to see if they also contain acetaminophen, or ask your doctor or pharmacist  Acetaminophen can cause liver damage if not taken correctly  Do not use more than 4 grams (4,000 milligrams) total of acetaminophen in one day  · Treatment  may be given for back, muscle, or posture problems  You may also need treatment for jaw or tooth problems  What can I do to manage my symptoms? · Keep a headache record  Include when the headaches start and stop and what made them better  Describe your symptoms, such as how the pain feels, where it is, and how bad it is  Record anything you ate or drank for the past 24 hours before your headache  Bring this to follow-up visits  · Apply heat as directed  Heat may help decrease headache pain and muscle spasms  Apply heat on the area for 20 to 30 minutes every 2 hours for as many days as directed  A warm bath may also help relieve muscle tension and spasms  · Apply ice as directed  Ice may help decrease headache pain  Use an ice pack or put crushed ice in a plastic bag  Cover it with a towel and place it on the area for 15 to 20 minutes every hour as directed  · Ask about spinal manipulation  This treatment combines movement, massage, exercise, and physical therapy  It can help relieve a tension headache, and may be able to prevent another if done regularly  Talk to your healthcare provider to make sure it is safe for you  To prevent neck injuries, spinal manipulation should only be done by trained providers  What can I do to prevent a tension headache? · Avoid muscle tension  Do not stay in one position for long periods of time  Use a different pillow if you wake up with sore neck and shoulder muscles  Find ways to relax your muscles, such as massage or resting in a quiet, dark room  · Avoid eye strain    Make sure you have good lighting when you read, sew, or do similar activities  Get yearly eye exams and wear glasses as directed  · Get enough sleep  Get 8 to 10 hours of sleep each night  Create a sleep schedule  Go to bed and wake up at the same times each day  It may be helpful to do something relaxing before bed  Do not watch television right before bed  · Eat a variety of healthy foods  Healthy foods include fruits, vegetables, whole-grain breads, low-fat dairy products, beans, lean meats, and fish  Do not eat foods that trigger your headaches  · Exercise regularly  Exercise helps decrease stress and headaches  Ask about the best exercise plan for you  · Drink liquids as directed  You may need to drink more liquid to prevent dehydration  Dehydration can make a tension headache worse  Ask your healthcare provider how much liquid to drink and which liquids are best for you  Limit caffeine as directed  Caffeine may make a tension headache worse  · Do not drink alcohol  Alcohol can trigger a headache  It can also prevent medicines from stopping your headache  · Do not smoke  Nicotine and other chemicals in cigarettes and cigars can trigger a headache and also cause lung damage  Ask your healthcare provider for information if you currently smoke and need help to quit  E-cigarettes or smokeless tobacco still contain nicotine  Talk to your healthcare provider before you use these products  Call your local emergency number [phone] in the [facility] if:   · You have difficulty seeing, speaking, or moving  · You have a seizure  When should I call my doctor? · You pass out or become confused  · You have a sudden headache that seems different or much worse than your usual headaches  · You have a headache, fever, and a stiff neck  · Your headaches continue to get worse  · Your headaches happen so often that they affect your ability to do your work or normal activities      · You need to take medicine to help your headaches more often than your healthcare provider says you should  · Your headaches get so bad that they cause you to vomit  · You have questions or concerns about your condition or care  CARE AGREEMENT:   You have the right to help plan your care  Learn about your health condition and how it may be treated  Discuss treatment options with your healthcare providers to decide what care you want to receive  You always have the right to refuse treatment  The above information is an  only  It is not intended as medical advice for individual conditions or treatments  Talk to your doctor, nurse or pharmacist before following any medical regimen to see if it is safe and effective for you  © Copyright [employer] 2022 Information is for End User's use only and may not be sold, redistributed or otherwise used for commercial purposes   All illustrations and images included in CareNotes® are the copyrighted property of A D A M , Inc  or [address] [place] [address]

## 2022-04-15 NOTE — ASSESSMENT & PLAN NOTE
The patient is presenting with 2 week history of frontotemporal tension headache worsened by stress, not alleviated with increased hydration, Tylenol round the clock, Excedrin or NSAIDs  Patient states she has homeless for the past week in lost her sister approximately 2 months ago  Patient also has 4 young children who she takes her herself with very little support from the father in family     -the patient exhibits a high RADHA score of 15 and PHQ-9 score of 13  -patient is already taking Zoloft 50 milligrams at bedtime, increase to 100 milligram tablet daily   -prescribed propranolol 10 milligrams t i d  to help with stressors  -prescribed magnesium 400 milligrams b i d  to alleviate headaches    -f/u 4 weeks

## 2022-05-11 DIAGNOSIS — G44.209 TENSION HEADACHE: ICD-10-CM

## 2022-05-11 DIAGNOSIS — F41.1 GAD (GENERALIZED ANXIETY DISORDER): ICD-10-CM

## 2022-05-11 RX ORDER — PROPRANOLOL HYDROCHLORIDE 10 MG/1
TABLET ORAL
Qty: 90 TABLET | Refills: 0 | Status: SHIPPED | OUTPATIENT
Start: 2022-05-11 | End: 2022-07-08

## 2022-05-12 RX ORDER — LANOLIN ALCOHOL/MO/W.PET/CERES
CREAM (GRAM) TOPICAL
Qty: 60 TABLET | Refills: 2 | Status: SHIPPED | OUTPATIENT
Start: 2022-05-12

## 2022-05-20 ENCOUNTER — HOSPITAL ENCOUNTER (EMERGENCY)
Facility: HOSPITAL | Age: 32
Discharge: HOME | End: 2022-05-20
Attending: EMERGENCY MEDICINE
Payer: MEDICARE

## 2022-05-20 VITALS
HEART RATE: 72 BPM | DIASTOLIC BLOOD PRESSURE: 68 MMHG | BODY MASS INDEX: 29.08 KG/M2 | TEMPERATURE: 98.8 F | WEIGHT: 178 LBS | OXYGEN SATURATION: 97 % | SYSTOLIC BLOOD PRESSURE: 118 MMHG | RESPIRATION RATE: 16 BRPM

## 2022-05-20 DIAGNOSIS — K08.89 PAIN, DENTAL: Primary | ICD-10-CM

## 2022-05-20 PROCEDURE — 99282 EMERGENCY DEPT VISIT SF MDM: CPT

## 2022-05-20 PROCEDURE — 99284 EMERGENCY DEPT VISIT MOD MDM: CPT | Performed by: EMERGENCY MEDICINE

## 2022-05-20 RX ORDER — CHLORHEXIDINE GLUCONATE 0.12 MG/ML
15 RINSE ORAL 2 TIMES DAILY
Qty: 120 ML | Refills: 0 | Status: SHIPPED | OUTPATIENT
Start: 2022-05-20 | End: 2022-07-07

## 2022-05-20 NOTE — ED PROVIDER NOTES
History  Chief Complaint   Patient presents with    Dental Pain     Pt c/o bilateral upper tooth pain  Unable to get dentist appt until august  No meds prior to arrival          Dental Pain  Location:  Upper  Upper teeth location:  2/RU 2nd molar, 3/RU 1st molar, 16/DANIA 3rd molar and 15/DANIA 2nd molar  Quality:  Constant  Severity:  Moderate  Onset quality:  Gradual  Timing:  Constant  Chronicity:  New  Context: dental caries    Context: not abscess, cap still on, not crown fracture, not dental fracture, not enamel fracture, normal dentition, not recent dental surgery and not trauma    Relieved by:  Nothing  Worsened by:  Nothing  Ineffective treatments:  Acetaminophen and NSAIDs  Associated symptoms: no congestion, no difficulty swallowing, no drooling, no facial pain, no facial swelling, no fever, no headaches and no neck pain        Prior to Admission Medications   Prescriptions Last Dose Informant Patient Reported? Taking? albuterol (PROVENTIL HFA,VENTOLIN HFA) 90 mcg/act inhaler  Self Yes No   Sig: TAKE 2 PUFFS BY MOUTH EVERY 6 HOURS AS NEEDED FOR WHEEZE OR FOR SHORTNESS OF BREATH   hydrOXYzine HCL (ATARAX) 25 mg tablet  Self No No   Sig: TAKE 1 TABLET BY MOUTH EVERY 6 HOURS AS NEEDED FOR ANXIETY     magnesium Oxide (MAG-OX) 400 mg TABS   No No   Sig: TAKE 1 TABLET (400 MG TOTAL) BY MOUTH 2 TIMES A DAY   ondansetron (ZOFRAN-ODT) 4 mg disintegrating tablet  Self Yes No   Sig: TAKE 1 TABLET BY MOUTH EVERY 8 HOURS AS NEEDED FOR NAUSEA AND VOMITING   Patient not taking: Reported on 4/15/2022   propranolol (INDERAL) 10 mg tablet   No No   Sig: TAKE 1 TABLET BY MOUTH THREE TIMES A DAY   ramelteon (ROZEREM) 8 mg tablet  Self No No   Sig: Take 1 tablet (8 mg total) by mouth daily at bedtime   Patient not taking: Reported on 4/15/2022    sertraline (ZOLOFT) 100 mg tablet   No No   Sig: Take 1 tablet (100 mg total) by mouth daily at bedtime      Facility-Administered Medications: None       Past Medical History: Diagnosis Date    Depression     History of positive PPD     Known health problems: none     Pelvic kidney     Pelvic kidney     left side       Past Surgical History:   Procedure Laterality Date    APPENDECTOMY      WV COLPOSCOPY,CERVIX W/ADJ VAG,W/LOOP BX N/A 7/31/2020    Procedure: BIOPSY CONE/COLD KNIFE CERVIX, ECC;  Surgeon: Sandra Rosales MD;  Location: [facility];  Service: Gynecology       Family History   Problem Relation Age of Onset    Crohn's disease Brother     Crohn's disease Maternal Aunt     Diabetes Maternal Grandmother     Diabetes Maternal Grandfather     Diabetes Paternal Grandmother     Diabetes Paternal Grandfather     COPD Mother     Diabetes Father     Hypertension Father     Depression Sister      I have reviewed and agree with the history as documented  E-Cigarette/Vaping    E-Cigarette Use Never User      E-Cigarette/Vaping Substances    Nicotine No     THC No     CBD No     Flavoring No     Other No     Unknown No      Social History     Tobacco Use    Smoking status: Current Every Day Smoker     Packs/day: 0 25     Types: Cigarettes    Smokeless tobacco: Never Used    Tobacco comment: 4 cig / day    Vaping Use    Vaping Use: Never used   Substance Use Topics    Alcohol use: Yes     Comment: Weekends only    Drug use: Never        Review of Systems   Constitutional: Negative for chills and fever  HENT: Positive for dental problem  Negative for congestion, drooling, facial swelling and hearing loss  Eyes: Negative for visual disturbance  Respiratory: Negative for shortness of breath  Cardiovascular: Negative for chest pain  Gastrointestinal: Negative for abdominal pain, constipation, diarrhea, nausea and vomiting  Genitourinary: Negative for difficulty urinating  Musculoskeletal: Negative for myalgias and neck pain  Skin: Negative for color change  Neurological: Negative for dizziness and headaches     Psychiatric/Behavioral: Negative for agitation  All other systems reviewed and are negative  Physical Exam  ED Triage Vitals [05/20/22 0425]   Temperature Pulse Respirations Blood Pressure SpO2   98 8 °F (37 1 °C) 72 16 118/68 97 %      Temp src Heart Rate Source Patient Position - Orthostatic VS BP Location FiO2 (%)   -- -- -- -- --      Pain Score       --             Orthostatic Vital Signs  Vitals:    05/20/22 0425   BP: 118/68   Pulse: 72       Physical Exam  Vitals and nursing note reviewed  Constitutional:       General: She is not in acute distress  Appearance: Normal appearance  She is well-developed  She is not ill-appearing  HENT:      Head: Normocephalic and atraumatic  Right Ear: External ear normal       Left Ear: External ear normal       Nose: Nose normal       Mouth/Throat:      Mouth: Mucous membranes are moist       Pharynx: Oropharynx is clear  No oropharyngeal exudate  Comments: Pain at tooth 2,3,14,15    No abscess appreciated  Eyes:      General:         Right eye: No discharge  Left eye: No discharge  Extraocular Movements: Extraocular movements intact  Conjunctiva/sclera: Conjunctivae normal       Pupils: Pupils are equal, round, and reactive to light  Cardiovascular:      Rate and Rhythm: Normal rate and regular rhythm  Heart sounds: Normal heart sounds  No murmur heard  No friction rub  No gallop  Pulmonary:      Effort: Pulmonary effort is normal  No respiratory distress  Breath sounds: Normal breath sounds  No stridor  No wheezing  Abdominal:      General: Bowel sounds are normal  There is no distension  Palpations: Abdomen is soft  Tenderness: There is no abdominal tenderness  Musculoskeletal:         General: No swelling  Normal range of motion  Cervical back: Normal range of motion and neck supple  No rigidity  Skin:     General: Skin is warm and dry  Capillary Refill: Capillary refill takes less than 2 seconds     Neurological: General: No focal deficit present  Mental Status: She is alert and oriented to person, place, and time  Mental status is at baseline  Psychiatric:         Mood and Affect: Mood normal          Behavior: Behavior normal          ED Medications  Medications - No data to display    Diagnostic Studies  Results Reviewed     None                 No orders to display         Procedures  Procedures      ED Course                             SBIRT 22yo+    Flowsheet Row Most Recent Value   SBIRT (25 yo +)    In order to provide better care to our patients, we are screening all of our patients for alcohol and drug use  Would it be okay to ask you these screening questions? No Filed at: 05/20/2022 0507                [facility]  Number of Diagnoses or Management Options  Pain, dental  Diagnosis management comments: 28year old female presenting for dental pain  No abscess appreciated  Will give information to follow up with different dental clinics so she can have an appointment before august     Return precautions given and the patient was discharged home  Disposition  Final diagnoses:   Pain, dental     Time reflects when diagnosis was documented in both MDM as applicable and the Disposition within this note     Time User Action Codes Description Comment    5/20/2022  4:45 AM Jazlyn Rolon Add [K08 89] Pain, dental       ED Disposition     ED Disposition   Discharge    Condition   Stable    Date/Time   Fri May 20, 2022  4:46 AM    Comment   Nasir Celis discharge to home/self care                 Follow-up Information     Follow up With Specialties Details Why Contact Info Additional [address] Dentistry Schedule an appointment as soon as possible for a visit   [facility] [id number]  [address], [address] [place], [place], [address], [address] Emergency Department Emergency Medicine Go to  If symptoms worsen, As needed [address] Emergency Department, [address]          Patient's Medications   Discharge Prescriptions    CHLORHEXIDINE (PERIDEX) 0 12 % SOLUTION    Apply 15 mL to the mouth or throat in the morning and 15 mL in the evening  Swish and spit  Start Date: 5/20/2022 End Date: --       Order Dose: 15 mL       Quantity: 120 mL    Refills: 0     No discharge procedures on file  PDMP Review     None           ED Provider  Attending physically available and evaluated Christopher Hdz I managed the patient along with the ED Attending      Electronically Signed by         Anish Sánchez MD  05/20/22 1176

## 2022-05-20 NOTE — DISCHARGE INSTRUCTIONS
Call the dental clinics I have given you to schedule an appointment with a dentist     Cassius Varela can use the mouth wash I prescribed twice a day  Swish and spit it out  Return to the ED if you develop fevers or chills

## 2022-06-02 NOTE — ED ATTENDING ATTESTATION
5/20/2022  I, Richard Hartman MD, saw and evaluated the patient  I have discussed the patient with the resident/non-physician practitioner and agree with the resident's/non-physician practitioner's findings, Plan of Care, and MDM as documented in the resident's/non-physician practitioner's note, except where noted  All available labs and Radiology studies were reviewed  I was present for key portions of any procedure(s) performed by the resident/non-physician practitioner and I was immediately available to provide assistance  At this point I agree with the current assessment done in the Emergency Department  I have conducted an independent evaluation of this patient a history and physical is as follows:    ED Course     Patient presents for evaluation due to bilateral upper dental pain  Patient has an appointment with a dentist in August   No additional complaints  Exam: AAOx3, NAD, dental caries  A/P:  Dental pain    Will give referral to Dental     Critical Care Time  Procedures

## 2022-07-07 ENCOUNTER — OFFICE VISIT (OUTPATIENT)
Dept: FAMILY MEDICINE CLINIC | Facility: CLINIC | Age: 32
End: 2022-07-07

## 2022-07-07 VITALS
SYSTOLIC BLOOD PRESSURE: 105 MMHG | BODY MASS INDEX: 28.32 KG/M2 | WEIGHT: 170 LBS | HEART RATE: 87 BPM | RESPIRATION RATE: 19 BRPM | OXYGEN SATURATION: 98 % | DIASTOLIC BLOOD PRESSURE: 71 MMHG | HEIGHT: 65 IN | TEMPERATURE: 98.4 F

## 2022-07-07 DIAGNOSIS — F41.1 GAD (GENERALIZED ANXIETY DISORDER): ICD-10-CM

## 2022-07-07 DIAGNOSIS — F51.02 ADJUSTMENT INSOMNIA: Primary | ICD-10-CM

## 2022-07-07 DIAGNOSIS — G44.209 TENSION HEADACHE: ICD-10-CM

## 2022-07-07 DIAGNOSIS — F32.1 CURRENT MODERATE EPISODE OF MAJOR DEPRESSIVE DISORDER, UNSPECIFIED WHETHER RECURRENT (HCC): ICD-10-CM

## 2022-07-07 PROCEDURE — 99213 OFFICE O/P EST LOW 20 MIN: CPT | Performed by: FAMILY MEDICINE

## 2022-07-07 RX ORDER — SERTRALINE HYDROCHLORIDE 100 MG/1
200 TABLET, FILM COATED ORAL
Qty: 90 TABLET | Refills: 2
Start: 2022-07-07 | End: 2022-07-12 | Stop reason: SDUPTHER

## 2022-07-07 RX ORDER — ZOLPIDEM TARTRATE 5 MG/1
5 TABLET ORAL
Qty: 30 TABLET | Refills: 0 | Status: SHIPPED | OUTPATIENT
Start: 2022-07-07 | End: 2022-07-28 | Stop reason: SDUPTHER

## 2022-07-07 NOTE — ASSESSMENT & PLAN NOTE
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

## 2022-07-07 NOTE — ASSESSMENT & PLAN NOTE
Pt c/o worsening insomia since death of her sister on 1/3/2022 due to suicide  - Pt admits to recurrent nightmares about death and accidents and has been awaken 2-3 times per night with panic attacks  - Tried Atarax, Ramelteon, Melatonin, Unisom without improvement  Trazodone causes nightmare and suicidal ideation   - Exam + fatigue and depressed mood   Patient because tearful when discussed her symptoms  - Counseled on sleep hygiene  - Will trial short course of Ambien 5mg qHS for sleep aid  - Referral to psych

## 2022-07-07 NOTE — PROGRESS NOTES
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  -  from her  in 2019  - Pt talks to her Mother daily who lives in [place]  Other 7 siblings are not close  - Currently on Zoloft 100mg daily with some improvement  - Exam + fatigue and depressed mood  Patient because tearful when discussed her symptoms  - PHQ9 score 20 today  - Will increase Zoloft to 200mg daily (max dose)  - Referral to psych           Relevant Medications    sertraline (ZOLOFT) 100 mg tablet    zolpidem (AMBIEN) 5 mg tablet    Other Relevant Orders    Ambulatory Referral to Psychiatry    RADHA (generalized anxiety disorder)    Relevant Medications    sertraline (ZOLOFT) 100 mg tablet    zolpidem (AMBIEN) 5 mg tablet    Other Relevant Orders    Ambulatory Referral to Psychiatry    Adjustment insomnia - Primary     Pt c/o worsening insomia since death of her sister on 1/3/2022 due to suicide  - Pt admits to recurrent nightmares about death and accidents and has been awaken 2-3 times per night with panic attacks  - Tried Atarax, Ramelteon, Melatonin, Unisom without improvement  Trazodone causes nightmare and suicidal ideation   - Exam + fatigue and depressed mood   Patient because tearful when discussed her symptoms  - Counseled on sleep hygiene  - Will trial short course of Ambien 5mg qHS for sleep aid  - Referral to psych Relevant Medications    zolpidem (AMBIEN) 5 mg tablet    Other Relevant Orders    Ambulatory Referral to Psychiatry          Follow-up: Return in about 1 month (around 8/7/2022) for depression & insomnia  Health Maintenance Due   Topic Date Due    Pneumococcal Vaccine: Pediatrics (0 to 5 Years) and At-Risk Patients (6 to 59 Years) (1 - PCV) Never done    Annual Physical  06/03/2021    COVID-19 Vaccine (3 - Booster for Pfizer series) 12/02/2021    Influenza Vaccine (1) 09/01/2022         Patient Active Problem List   Diagnosis    Spontaneous vaginal delivery    IUD threads lost    Major depressive disorder    HGSIL (high grade squamous intraepithelial lesion) on Pap smear of cervix    Pelvic kidney    Abnormal uterine bleeding    RADHA (generalized anxiety disorder)    Adjustment insomnia    Overweight (BMI 25 0-29  9)    Tension headache         Recent Visits:     Recent Visits  No visits were found meeting these conditions  Showing recent visits within past 7 days and meeting all other requirements  Today's Visits  Date Type Provider Dept   07/07/22 Office Visit Dorian Zarate MD  [facility]   Showing today's visits and meeting all other requirements  Future Appointments  No visits were found meeting these conditions  Showing future appointments within next 150 days and meeting all other requirements      Subjective     Subjective:     Chief Complaint   Patient presents with    Follow up sleeping problems       HPI:  28 y o  female presents for c/o worsening insomia since death of her sister on 1/3/2022 due to suicide  Pt admits to recurrent nightmares about death and accidents and has been awaken 2-3 times per night with panic attacks  Tried Atarax, Ramelteon, Melatonin, Unisom without improvement  Trazodone causes nightmare and suicidal ideation  Review of System:     Review of Systems   Constitutional: Positive for activity change, appetite change and fatigue   Negative for chills and fever  HENT: Negative for congestion, rhinorrhea and sore throat  Eyes: Negative for pain and discharge  Respiratory: Negative for cough, chest tightness and shortness of breath  Cardiovascular: Negative for chest pain, palpitations and leg swelling  Gastrointestinal: Negative for abdominal pain, blood in stool, constipation, diarrhea, nausea and vomiting  Endocrine: Negative for polydipsia, polyphagia and polyuria  Genitourinary: Negative for dysuria, frequency and hematuria  Musculoskeletal: Negative for arthralgias and myalgias  Skin: Negative for rash and wound  Neurological: Negative for seizures, syncope and headaches  Psychiatric/Behavioral: Positive for decreased concentration, dysphoric mood and sleep disturbance  Negative for behavioral problems  Historical Information     History:     Past Medical History:   Diagnosis Date    Depression     History of positive PPD     Known health problems: none     Pelvic kidney     Pelvic kidney     left side     Past Surgical History:   Procedure Laterality Date    APPENDECTOMY      OK COLPOSCOPY,CERVIX W/ADJ VAG,W/LOOP BX N/A 7/31/2020    Procedure: BIOPSY CONE/COLD KNIFE CERVIX, ECC;  Surgeon: Jhonathan Munoz MD;  [facility];  Service: Gynecology     Social History   Social History     Substance and Sexual Activity   Alcohol Use Yes    Comment: Weekends only     Social History     Substance and Sexual Activity   Drug Use Never     Social History     Tobacco Use   Smoking Status Current Every Day Smoker    Packs/day: 0 25    Types: Cigarettes   Smokeless Tobacco Never Used   Tobacco Comment    4 cig / day        Meds/Allergies     Medications & Allergies:      Allergies   Allergen Reactions    Metoclopramide Hives       PTA Medications:  Current Outpatient Medications on File Prior to Visit   Medication Sig Dispense Refill    albuterol (PROVENTIL HFA,VENTOLIN HFA) 90 mcg/act inhaler TAKE 2 PUFFS BY MOUTH EVERY 6 HOURS AS NEEDED FOR WHEEZE OR FOR SHORTNESS OF BREATH      hydrOXYzine HCL (ATARAX) 25 mg tablet TAKE 1 TABLET BY MOUTH EVERY 6 HOURS AS NEEDED FOR ANXIETY  30 tablet 0    ondansetron (ZOFRAN-ODT) 4 mg disintegrating tablet TAKE 1 TABLET BY MOUTH EVERY 8 HOURS AS NEEDED FOR NAUSEA AND VOMITING      [DISCONTINUED] sertraline (ZOLOFT) 100 mg tablet Take 1 tablet (100 mg total) by mouth daily at bedtime 90 tablet 2    magnesium Oxide (MAG-OX) 400 mg TABS TAKE 1 TABLET (400 MG TOTAL) BY MOUTH 2 TIMES A DAY (Patient not taking: Reported on 7/7/2022) 60 tablet 2    propranolol (INDERAL) 10 mg tablet TAKE 1 TABLET BY MOUTH THREE TIMES A DAY (Patient not taking: Reported on 7/7/2022) 90 tablet 0    [DISCONTINUED] chlorhexidine (PERIDEX) 0 12 % solution Apply 15 mL to the mouth or throat in the morning and 15 mL in the evening  Swish and spit  120 mL 0    [DISCONTINUED] ramelteon (ROZEREM) 8 mg tablet Take 1 tablet (8 mg total) by mouth daily at bedtime (Patient not taking: No sig reported) 30 tablet 0     No current facility-administered medications on file prior to visit  Objective     Objective & Vitals:   Vitals: /71 (BP Location: Left arm, Patient Position: Sitting, Cuff Size: Standard)   Pulse 87   Temp 98 4 °F (36 9 °C) (Temporal)   Resp 19   Ht 5' 5" (1 651 m)   Wt 77 1 kg (170 lb)   SpO2 98%   BMI 28 29 kg/m²       Labs, Imagings, and other studies:   I have personally reviewed pertinent reports  No results found for this or any previous visit (from the past 24 hour(s))  No results found  Physical Exam   Physical Exam  Vitals and nursing note reviewed  Constitutional:       General: She is not in acute distress  Appearance: Normal appearance  She is well-developed and normal weight  She is not ill-appearing, toxic-appearing or diaphoretic  HENT:      Head: Normocephalic and atraumatic        Right Ear: External ear normal       Left Ear: External ear normal       Nose: Nose normal       Mouth/Throat:      Mouth: Mucous membranes are moist       Pharynx: Oropharynx is clear  No oropharyngeal exudate or posterior oropharyngeal erythema  Eyes:      General: No scleral icterus  Right eye: No discharge  Left eye: No discharge  Extraocular Movements: Extraocular movements intact  Conjunctiva/sclera: Conjunctivae normal    Cardiovascular:      Rate and Rhythm: Normal rate and regular rhythm  Pulses: Normal pulses  Heart sounds: Normal heart sounds  No murmur heard  No gallop  Pulmonary:      Effort: Pulmonary effort is normal  No respiratory distress  Breath sounds: Normal breath sounds  No stridor  No wheezing, rhonchi or rales  Abdominal:      General: Abdomen is flat  Bowel sounds are normal  There is no distension  Palpations: Abdomen is soft  Tenderness: There is no abdominal tenderness  There is no guarding  Musculoskeletal:         General: Normal range of motion  Cervical back: Normal range of motion  Skin:     General: Skin is warm  Neurological:      General: No focal deficit present  Mental Status: She is alert  Mental status is at baseline  Psychiatric:         Attention and Perception: Attention normal          Mood and Affect: Mood is depressed  Affect is tearful  Speech: Speech normal          Behavior: Behavior normal          Thought Content: Thought content normal  Thought content does not include homicidal or suicidal ideation  Thought content does not include homicidal or suicidal plan                  Health Maintenance:     Health Maintenance Due   Topic Date Due    Pneumococcal Vaccine: Pediatrics (0 to 5 Years) and At-Risk Patients (6 to 59 Years) (1 - PCV) Never done    Annual Physical  06/03/2021    COVID-19 Vaccine (3 - Booster for Pfizer series) 12/02/2021    Influenza Vaccine (1) 09/01/2022         Future Labs & Appointments:     FUTURE LABS:  Lab Frequency Next Occurrence FUTURE CONFIRMED APPOINTMENTS:  No future appointments      Gus Hunt MD  PGY-2, Family Medicine  07/07/22, 7:06 PM

## 2022-07-08 RX ORDER — PROPRANOLOL HYDROCHLORIDE 10 MG/1
TABLET ORAL
Qty: 90 TABLET | Refills: 0 | Status: ON HOLD | OUTPATIENT
Start: 2022-07-08 | End: 2022-08-04

## 2022-07-11 RX ORDER — HYDROXYZINE HYDROCHLORIDE 25 MG/1
TABLET, FILM COATED ORAL
Qty: 30 TABLET | Refills: 0 | Status: SHIPPED | OUTPATIENT
Start: 2022-07-11 | End: 2022-09-15 | Stop reason: SDUPTHER

## 2022-07-12 DIAGNOSIS — F41.1 GAD (GENERALIZED ANXIETY DISORDER): ICD-10-CM

## 2022-07-12 DIAGNOSIS — F32.1 CURRENT MODERATE EPISODE OF MAJOR DEPRESSIVE DISORDER, UNSPECIFIED WHETHER RECURRENT (HCC): ICD-10-CM

## 2022-07-12 RX ORDER — SERTRALINE HYDROCHLORIDE 100 MG/1
200 TABLET, FILM COATED ORAL
Qty: 90 TABLET | Refills: 2 | Status: SHIPPED | OUTPATIENT
Start: 2022-07-12

## 2022-07-12 NOTE — TELEPHONE ENCOUNTER
Pt saw Dr Telly Zavala 7/7, Zoloft was set to "no print" instead of being sent electronically, she would like sent today if possible does not have any medication

## 2022-07-14 ENCOUNTER — TELEPHONE (OUTPATIENT)
Dept: PSYCHIATRY | Facility: CLINIC | Age: 32
End: 2022-07-14

## 2022-07-22 ENCOUNTER — TELEPHONE (OUTPATIENT)
Dept: PSYCHIATRY | Facility: CLINIC | Age: 32
End: 2022-07-22

## 2022-07-27 ENCOUNTER — ANNUAL EXAM (OUTPATIENT)
Dept: OBGYN CLINIC | Facility: CLINIC | Age: 32
End: 2022-07-27

## 2022-07-27 VITALS
SYSTOLIC BLOOD PRESSURE: 104 MMHG | HEIGHT: 65 IN | HEART RATE: 88 BPM | BODY MASS INDEX: 27.79 KG/M2 | WEIGHT: 166.8 LBS | DIASTOLIC BLOOD PRESSURE: 68 MMHG

## 2022-07-27 DIAGNOSIS — Z12.39 ENCOUNTER FOR BREAST CANCER SCREENING USING NON-MAMMOGRAM MODALITY: ICD-10-CM

## 2022-07-27 DIAGNOSIS — Z59.9 FINANCIAL DIFFICULTIES: ICD-10-CM

## 2022-07-27 DIAGNOSIS — Z59.41 FOOD INSECURITY: ICD-10-CM

## 2022-07-27 DIAGNOSIS — Z11.3 SCREEN FOR STD (SEXUALLY TRANSMITTED DISEASE): ICD-10-CM

## 2022-07-27 DIAGNOSIS — Z01.419 WOMEN'S ANNUAL ROUTINE GYNECOLOGICAL EXAMINATION: Primary | ICD-10-CM

## 2022-07-27 DIAGNOSIS — Z71.6 ENCOUNTER FOR SMOKING CESSATION COUNSELING: ICD-10-CM

## 2022-07-27 PROCEDURE — 87491 CHLMYD TRACH DNA AMP PROBE: CPT | Performed by: NURSE PRACTITIONER

## 2022-07-27 PROCEDURE — 87591 N.GONORRHOEAE DNA AMP PROB: CPT | Performed by: NURSE PRACTITIONER

## 2022-07-27 PROCEDURE — G0145 SCR C/V CYTO,THINLAYER,RESCR: HCPCS | Performed by: NURSE PRACTITIONER

## 2022-07-27 PROCEDURE — 87661 TRICHOMONAS VAGINALIS AMPLIF: CPT | Performed by: NURSE PRACTITIONER

## 2022-07-27 PROCEDURE — 87563 M. GENITALIUM AMP PROBE: CPT | Performed by: NURSE PRACTITIONER

## 2022-07-27 PROCEDURE — G0476 HPV COMBO ASSAY CA SCREEN: HCPCS | Performed by: NURSE PRACTITIONER

## 2022-07-27 PROCEDURE — 99395 PREV VISIT EST AGE 18-39: CPT | Performed by: NURSE PRACTITIONER

## 2022-07-27 SDOH — ECONOMIC STABILITY - FOOD INSECURITY: FOOD INSECURITY: Z59.41

## 2022-07-27 SDOH — ECONOMIC STABILITY - INCOME SECURITY: PROBLEM RELATED TO HOUSING AND ECONOMIC CIRCUMSTANCES, UNSPECIFIED: Z59.9

## 2022-07-27 NOTE — PROGRESS NOTES
ANNUAL GYNECOLOGICAL EXAMINATION    Elvia Martinez is a 28 y o  female who presents today for annual GYN exam   Her last pap smear was performed 2020 and result was HSIL with +HPV16  She had a follow up colposcopy showing HSIL/MESFIN II-III  She then had a Cold knife cone 2020 showing MESFIN 3 with negative margins  She has not had any pap follow up since  She has not received the HPV vaccine series  She reports menses as abnormal and heavy, states this has been a long term problem  She has previously tried OCPs, depo-provera, Mirena and currently has a Liletta  [place] was placed in [place] (around 2019), strings have been lost since at least , TVUS showed IUD in uterus in   She was consented for a hysterectomy in 2020 but did not complete follow up  She reports she is ready to proceed with hysterectomy pending results of this pap smear  Patient's last menstrual period was 2022 (exact date)    Her general medical history has been reviewed and she reports it as follows:    Past Medical History:   Diagnosis Date    Depression     History of positive PPD     Known health problems: none     Pelvic kidney     Pelvic kidney     left side     Past Surgical History:   Procedure Laterality Date    APPENDECTOMY      NE COLPOSCOPY,CERVIX W/ADJ VAG,W/LOOP BX N/A 2020    Procedure: BIOPSY CONE/COLD KNIFE CERVIX, ECC;  Surgeon: Yonatan Daniel MD;  [facility];  Service: Gynecology     OB History        4    Para   4    Term   3       1    AB        Living   4       SAB        IAB        Ectopic        Multiple   0    Live Births   4               Social History     Tobacco Use    Smoking status: Current Every Day Smoker     Packs/day: 0 25     Types: Cigarettes    Smokeless tobacco: Never Used    Tobacco comment: 4 cig / day    Vaping Use    Vaping Use: Never used   Substance Use Topics    Alcohol use: Yes     Comment: Weekends only    Drug use: Never     Social History     Substance and Sexual Activity   Sexual Activity Yes    Partners: Male    Birth control/protection: I U D  Cancer-related family history is not on file  Current Outpatient Medications   Medication Instructions    albuterol (PROVENTIL HFA,VENTOLIN HFA) 90 mcg/act inhaler TAKE 2 PUFFS BY MOUTH EVERY 6 HOURS AS NEEDED FOR WHEEZE OR FOR SHORTNESS OF BREATH    hydrOXYzine HCL (ATARAX) 25 mg tablet TAKE 1 TABLET BY MOUTH EVERY 6 HOURS AS NEEDED FOR ANXIETY    magnesium Oxide (MAG-OX) 400 mg TABS TAKE 1 TABLET (400 MG TOTAL) BY MOUTH 2 TIMES A DAY    ondansetron (ZOFRAN-ODT) 4 mg disintegrating tablet TAKE 1 TABLET BY MOUTH EVERY 8 HOURS AS NEEDED FOR NAUSEA AND VOMITING    propranolol (INDERAL) 10 mg tablet TAKE 1 TABLET BY MOUTH THREE TIMES A DAY    sertraline (ZOLOFT) 200 mg, Oral, Daily at bedtime    zolpidem (AMBIEN) 5 mg, Oral, Daily at bedtime PRN       Review of Systems:  Review of Systems   Constitutional: Negative  Gastrointestinal: Negative  Genitourinary: Negative  Skin: Negative  Physical Exam:  /68   Pulse 88   Ht 5' 5" (1 651 m)   Wt 75 7 kg (166 lb 12 8 oz)   LMP 07/20/2022 (Exact Date)   BMI 27 76 kg/m²   Physical Exam  Constitutional:       General: She is not in acute distress  Appearance: Normal appearance  Genitourinary:      Vulva and bladder normal       No lesions in the vagina  No vaginal erythema or ulceration  Right Adnexa: not tender and no mass present  Left Adnexa: not tender and no mass present  No cervical motion tenderness or lesion  Uterus is not enlarged or tender  No uterine mass detected  Breasts:      Right: No mass, nipple discharge or skin change  Left: No mass, nipple discharge or skin change  Cardiovascular:      Rate and Rhythm: Normal rate and regular rhythm  Pulmonary:      Effort: Pulmonary effort is normal       Breath sounds: Normal breath sounds     Abdominal:      General: Abdomen is flat  Palpations: Abdomen is soft  Musculoskeletal:      Cervical back: Neck supple  Neurological:      Mental Status: She is alert  Skin:     General: Skin is warm and dry  Psychiatric:         Mood and Affect: Mood normal          Behavior: Behavior normal    Vitals reviewed  Assessment/Plan:   1  Normal well-woman GYN exam   2  Cervical cancer screening:  Normal cervical exam   Pap smear done with HPV co-testing  3  STD screening: Orders placed for vaginal GC/CT cultures  4  Breast cancer screening:  Normal breast exam  Reviewed breast self-awareness  5  Depression Screening: Patient's depression screening was assessed with a PHQ-9 score of 18  Continue regular follow-up with their psychologist/therapist/psychiatrist who is managing their mental health condition(s)  Referral placed for  consultation  6  BMI Counseling: Body mass index is 27 76 kg/m²  Discussed the patient's BMI with her  The BMI is above normal  Nutrition recommendations include reducing portion sizes, decreasing overall calorie intake, 3-5 servings of fruits/vegetables daily, moderation in carbohydrate intake and increasing intake of lean protein  Exercise recommendations include moderate aerobic physical activity for 150 minutes/week and exercising 3-5 times per week  Patient has successfully lost around 30 pounds with diet  7  Tobacco Cessation Counseling: Tobacco cessation counseling and education was provided  The patient is sincerely urged to quit consumption of tobacco  She is ready to quit tobacco  The numerous health risks of tobacco consumption were discussed  Patient was provided a referral for tobacco cessation management  8  Contraception:  Currently has Liletta, strings still not visualized  Desires BTL vs  Hysterectomy  9  Will return to office in two weeks to further discuss surgical plan for management of heavy menses and history of abnormal pap smears         Reviewed with patient that test results are available in MyChart immediately, but that they will not necessarily be reviewed by me immediately  Explained that I will review results at my earliest opportunity and contact patient appropriately

## 2022-07-28 ENCOUNTER — PATIENT OUTREACH (OUTPATIENT)
Dept: OBGYN CLINIC | Facility: CLINIC | Age: 32
End: 2022-07-28

## 2022-07-28 LAB
C TRACH DNA SPEC QL NAA+PROBE: NEGATIVE
M GENITALIUM DNA SPEC QL NAA+PROBE: NEGATIVE
N GONORRHOEA DNA SPEC QL NAA+PROBE: NEGATIVE
T VAGINALIS DNA SPEC QL NAA+PROBE: NEGATIVE

## 2022-07-29 LAB
HPV HR 12 DNA CVX QL NAA+PROBE: NEGATIVE
HPV16 DNA CVX QL NAA+PROBE: NEGATIVE
HPV18 DNA CVX QL NAA+PROBE: NEGATIVE

## 2022-08-02 ENCOUNTER — APPOINTMENT (EMERGENCY)
Dept: RADIOLOGY | Facility: HOSPITAL | Age: 32
DRG: 197 | End: 2022-08-02
Payer: MEDICARE

## 2022-08-02 ENCOUNTER — HOSPITAL ENCOUNTER (INPATIENT)
Facility: HOSPITAL | Age: 32
LOS: 2 days | Discharge: HOME | DRG: 197 | End: 2022-08-04
Attending: EMERGENCY MEDICINE | Admitting: OBSTETRICS & GYNECOLOGY
Payer: MEDICARE

## 2022-08-02 DIAGNOSIS — I82.890 THROMBOSIS OF OVARIAN VEIN: Primary | ICD-10-CM

## 2022-08-02 DIAGNOSIS — R05.9 COUGH: ICD-10-CM

## 2022-08-02 PROBLEM — R10.9 ABDOMINAL PAIN: Status: ACTIVE | Noted: 2022-08-02

## 2022-08-02 LAB
ALBUMIN SERPL BCP-MCNC: 3.7 G/DL (ref 3.5–5)
ALP SERPL-CCNC: 78 U/L (ref 46–116)
ALT SERPL W P-5'-P-CCNC: 28 U/L (ref 12–78)
ANION GAP SERPL CALCULATED.3IONS-SCNC: 2 MMOL/L (ref 4–13)
APTT PPP: 26 SECONDS (ref 23–37)
AST SERPL W P-5'-P-CCNC: 17 U/L (ref 5–45)
BASOPHILS # BLD AUTO: 0.05 THOUSANDS/ΜL (ref 0–0.1)
BASOPHILS NFR BLD AUTO: 1 % (ref 0–1)
BILIRUB SERPL-MCNC: 0.34 MG/DL (ref 0.2–1)
BILIRUB UR QL STRIP: NEGATIVE
BUN SERPL-MCNC: 9 MG/DL (ref 5–25)
CALCIUM SERPL-MCNC: 9.3 MG/DL (ref 8.3–10.1)
CHLORIDE SERPL-SCNC: 111 MMOL/L (ref 96–108)
CLARITY UR: CLEAR
CO2 SERPL-SCNC: 26 MMOL/L (ref 21–32)
COLOR UR: NORMAL
CREAT SERPL-MCNC: 0.82 MG/DL (ref 0.6–1.3)
EOSINOPHIL # BLD AUTO: 0.16 THOUSAND/ΜL (ref 0–0.61)
EOSINOPHIL NFR BLD AUTO: 2 % (ref 0–6)
ERYTHROCYTE [DISTWIDTH] IN BLOOD BY AUTOMATED COUNT: 12.3 % (ref 11.6–15.1)
EXT PREG TEST URINE: NEGATIVE
EXT. CONTROL ED NAV: NORMAL
FIBRINOGEN PPP-MCNC: 376 MG/DL (ref 227–495)
FLUAV RNA RESP QL NAA+PROBE: NEGATIVE
FLUBV RNA RESP QL NAA+PROBE: NEGATIVE
GFR SERPL CREATININE-BSD FRML MDRD: 94 ML/MIN/1.73SQ M
GLUCOSE SERPL-MCNC: 120 MG/DL (ref 65–140)
GLUCOSE UR STRIP-MCNC: NEGATIVE MG/DL
HCT VFR BLD AUTO: 42.3 % (ref 34.8–46.1)
HGB BLD-MCNC: 13.8 G/DL (ref 11.5–15.4)
HGB UR QL STRIP.AUTO: NEGATIVE
IMM GRANULOCYTES # BLD AUTO: 0.04 THOUSAND/UL (ref 0–0.2)
IMM GRANULOCYTES NFR BLD AUTO: 0 % (ref 0–2)
INR PPP: 0.97 (ref 0.84–1.19)
KETONES UR STRIP-MCNC: NEGATIVE MG/DL
LAB AP GYN PRIMARY INTERPRETATION: NORMAL
LEUKOCYTE ESTERASE UR QL STRIP: NEGATIVE
LIPASE SERPL-CCNC: 245 U/L (ref 73–393)
LYMPHOCYTES # BLD AUTO: 1.37 THOUSANDS/ΜL (ref 0.6–4.47)
LYMPHOCYTES NFR BLD AUTO: 15 % (ref 14–44)
Lab: NORMAL
MCH RBC QN AUTO: 31.2 PG (ref 26.8–34.3)
MCHC RBC AUTO-ENTMCNC: 32.6 G/DL (ref 31.4–37.4)
MCV RBC AUTO: 96 FL (ref 82–98)
MONOCYTES # BLD AUTO: 0.65 THOUSAND/ΜL (ref 0.17–1.22)
MONOCYTES NFR BLD AUTO: 7 % (ref 4–12)
NEUTROPHILS # BLD AUTO: 7.15 THOUSANDS/ΜL (ref 1.85–7.62)
NEUTS SEG NFR BLD AUTO: 75 % (ref 43–75)
NITRITE UR QL STRIP: NEGATIVE
NRBC BLD AUTO-RTO: 0 /100 WBCS
PH UR STRIP.AUTO: 6.5 [PH]
PLATELET # BLD AUTO: 190 THOUSANDS/UL (ref 149–390)
PMV BLD AUTO: 10.8 FL (ref 8.9–12.7)
POTASSIUM SERPL-SCNC: 3.5 MMOL/L (ref 3.5–5.3)
PROT SERPL-MCNC: 6.9 G/DL (ref 6.4–8.4)
PROT UR STRIP-MCNC: NEGATIVE MG/DL
PROTHROMBIN TIME: 13.1 SECONDS (ref 11.6–14.5)
RBC # BLD AUTO: 4.42 MILLION/UL (ref 3.81–5.12)
RSV RNA RESP QL NAA+PROBE: NEGATIVE
SARS-COV-2 RNA RESP QL NAA+PROBE: NEGATIVE
SODIUM SERPL-SCNC: 139 MMOL/L (ref 135–147)
SP GR UR STRIP.AUTO: 1.02 (ref 1–1.03)
UROBILINOGEN UR STRIP-ACNC: <2 MG/DL
WBC # BLD AUTO: 9.42 THOUSAND/UL (ref 4.31–10.16)

## 2022-08-02 PROCEDURE — G1004 CDSM NDSC: HCPCS

## 2022-08-02 PROCEDURE — 85384 FIBRINOGEN ACTIVITY: CPT | Performed by: OBSTETRICS & GYNECOLOGY

## 2022-08-02 PROCEDURE — 71046 X-RAY EXAM CHEST 2 VIEWS: CPT

## 2022-08-02 PROCEDURE — 36415 COLL VENOUS BLD VENIPUNCTURE: CPT | Performed by: EMERGENCY MEDICINE

## 2022-08-02 PROCEDURE — 80053 COMPREHEN METABOLIC PANEL: CPT | Performed by: EMERGENCY MEDICINE

## 2022-08-02 PROCEDURE — 99222 1ST HOSP IP/OBS MODERATE 55: CPT | Performed by: OBSTETRICS & GYNECOLOGY

## 2022-08-02 PROCEDURE — 81003 URINALYSIS AUTO W/O SCOPE: CPT | Performed by: EMERGENCY MEDICINE

## 2022-08-02 PROCEDURE — 96374 THER/PROPH/DIAG INJ IV PUSH: CPT

## 2022-08-02 PROCEDURE — 74176 CT ABD & PELVIS W/O CONTRAST: CPT

## 2022-08-02 PROCEDURE — 87591 N.GONORRHOEAE DNA AMP PROB: CPT | Performed by: OBSTETRICS & GYNECOLOGY

## 2022-08-02 PROCEDURE — 96375 TX/PRO/DX INJ NEW DRUG ADDON: CPT

## 2022-08-02 PROCEDURE — 87086 URINE CULTURE/COLONY COUNT: CPT | Performed by: OBSTETRICS & GYNECOLOGY

## 2022-08-02 PROCEDURE — 83690 ASSAY OF LIPASE: CPT | Performed by: EMERGENCY MEDICINE

## 2022-08-02 PROCEDURE — 87491 CHLMYD TRACH DNA AMP PROBE: CPT | Performed by: OBSTETRICS & GYNECOLOGY

## 2022-08-02 PROCEDURE — 87040 BLOOD CULTURE FOR BACTERIA: CPT | Performed by: OBSTETRICS & GYNECOLOGY

## 2022-08-02 PROCEDURE — 81025 URINE PREGNANCY TEST: CPT | Performed by: EMERGENCY MEDICINE

## 2022-08-02 PROCEDURE — 85025 COMPLETE CBC W/AUTO DIFF WBC: CPT | Performed by: EMERGENCY MEDICINE

## 2022-08-02 PROCEDURE — 0241U HB NFCT DS VIR RESP RNA 4 TRGT: CPT | Performed by: OBSTETRICS & GYNECOLOGY

## 2022-08-02 PROCEDURE — 99285 EMERGENCY DEPT VISIT HI MDM: CPT | Performed by: EMERGENCY MEDICINE

## 2022-08-02 PROCEDURE — 85610 PROTHROMBIN TIME: CPT | Performed by: OBSTETRICS & GYNECOLOGY

## 2022-08-02 PROCEDURE — 99285 EMERGENCY DEPT VISIT HI MDM: CPT

## 2022-08-02 PROCEDURE — 96376 TX/PRO/DX INJ SAME DRUG ADON: CPT

## 2022-08-02 PROCEDURE — 85730 THROMBOPLASTIN TIME PARTIAL: CPT | Performed by: OBSTETRICS & GYNECOLOGY

## 2022-08-02 RX ORDER — OXYCODONE HYDROCHLORIDE 5 MG/1
5 TABLET ORAL EVERY 4 HOURS PRN
Status: DISCONTINUED | OUTPATIENT
Start: 2022-08-02 | End: 2022-08-04 | Stop reason: HOSPADM

## 2022-08-02 RX ORDER — HYDROMORPHONE HCL/PF 1 MG/ML
0.4 SYRINGE (ML) INJECTION ONCE
Status: COMPLETED | OUTPATIENT
Start: 2022-08-02 | End: 2022-08-02

## 2022-08-02 RX ORDER — KETOROLAC TROMETHAMINE 30 MG/ML
15 INJECTION, SOLUTION INTRAMUSCULAR; INTRAVENOUS ONCE
Status: COMPLETED | OUTPATIENT
Start: 2022-08-02 | End: 2022-08-02

## 2022-08-02 RX ORDER — OXYCODONE HYDROCHLORIDE 10 MG/1
10 TABLET ORAL EVERY 4 HOURS PRN
Status: DISCONTINUED | OUTPATIENT
Start: 2022-08-02 | End: 2022-08-04 | Stop reason: HOSPADM

## 2022-08-02 RX ORDER — ENOXAPARIN SODIUM 100 MG/ML
1 INJECTION SUBCUTANEOUS EVERY 12 HOURS SCHEDULED
Status: DISCONTINUED | OUTPATIENT
Start: 2022-08-02 | End: 2022-08-03

## 2022-08-02 RX ORDER — HYDROMORPHONE HCL/PF 1 MG/ML
0.5 SYRINGE (ML) INJECTION EVERY 4 HOURS PRN
Status: DISCONTINUED | OUTPATIENT
Start: 2022-08-02 | End: 2022-08-03

## 2022-08-02 RX ORDER — ONDANSETRON 2 MG/ML
4 INJECTION INTRAMUSCULAR; INTRAVENOUS ONCE
Status: COMPLETED | OUTPATIENT
Start: 2022-08-02 | End: 2022-08-02

## 2022-08-02 RX ORDER — ACETAMINOPHEN 325 MG/1
650 TABLET ORAL EVERY 6 HOURS SCHEDULED
Status: DISCONTINUED | OUTPATIENT
Start: 2022-08-02 | End: 2022-08-04 | Stop reason: HOSPADM

## 2022-08-02 RX ORDER — HYDROMORPHONE HCL/PF 1 MG/ML
0.6 SYRINGE (ML) INJECTION ONCE
Status: COMPLETED | OUTPATIENT
Start: 2022-08-02 | End: 2022-08-02

## 2022-08-02 RX ADMIN — HYDROMORPHONE HYDROCHLORIDE 0.6 MG: 1 INJECTION, SOLUTION INTRAMUSCULAR; INTRAVENOUS; SUBCUTANEOUS at 17:50

## 2022-08-02 RX ADMIN — ENOXAPARIN SODIUM 80 MG: 80 INJECTION SUBCUTANEOUS at 23:03

## 2022-08-02 RX ADMIN — HYDROMORPHONE HYDROCHLORIDE 0.5 MG: 1 INJECTION, SOLUTION INTRAMUSCULAR; INTRAVENOUS; SUBCUTANEOUS at 22:06

## 2022-08-02 RX ADMIN — ACETAMINOPHEN 650 MG: 325 TABLET ORAL at 23:03

## 2022-08-02 RX ADMIN — KETOROLAC TROMETHAMINE 15 MG: 30 INJECTION, SOLUTION INTRAMUSCULAR at 15:33

## 2022-08-02 RX ADMIN — OXYCODONE HYDROCHLORIDE 10 MG: 10 TABLET ORAL at 20:21

## 2022-08-02 RX ADMIN — HYDROMORPHONE HYDROCHLORIDE 0.4 MG: 1 INJECTION, SOLUTION INTRAMUSCULAR; INTRAVENOUS; SUBCUTANEOUS at 16:22

## 2022-08-02 RX ADMIN — ACETAMINOPHEN 650 MG: 325 TABLET ORAL at 20:21

## 2022-08-02 RX ADMIN — ONDANSETRON 4 MG: 2 INJECTION INTRAMUSCULAR; INTRAVENOUS at 15:21

## 2022-08-02 NOTE — ED ATTENDING ATTESTATION
8/2/2022  I, Silvia Gong MD, saw and evaluated the patient  I have discussed the patient with the resident/non-physician practitioner and agree with the resident's/non-physician practitioner's findings, Plan of Care, and MDM as documented in the resident's/non-physician practitioner's note, except where noted  All available labs and Radiology studies were reviewed  I was present for key portions of any procedure(s) performed by the resident/non-physician practitioner and I was immediately available to provide assistance  At this point I agree with the current assessment done in the Emergency Department  I have conducted an independent evaluation of this patient a history and physical is as follows:    80-year-old woman presenting with sudden onset abdominal and flank pain this afternoon, pain is worst in the left lower quadrant but also present in right lower quadrant and right flank  No fever  There is some nausea but no vomiting  No pain or burning with urination  No vaginal bleeding or discharge  On exam patient awake alert, uncomfortable appearing  Head atraumatic normocephalic  Oropharynx clear  Neck is supple  Heart regular rate rhythm, no murmurs rubs gallops  Lungs clear auscultation bilaterally  Abdomen soft nondistended  There is left lower quadrant right lower quadrant tenderness with no rebound or guarding  There is right CVA tenderness  Labs and imaging were done  The patient has an acute ovarian vein thrombosis  OBGYN consulted and they will come evaluate the patient      ED Course         Critical Care Time  Procedures

## 2022-08-02 NOTE — Clinical Note
Case was discussed with Dr Jessica Renteria, and the patient's admission status was agreed to be Admission Status: observation status to the service of Dr Joseph Collier

## 2022-08-02 NOTE — ED PROVIDER NOTES
History  Chief Complaint   Patient presents with    Abdominal Pain     With right flank pain and wraps around to lower abd and pain in LLQ of abd, just started about 1 hour ago  Pt shaking with pain  Denies nausea or vomiting      29-year-old female with a past medical history of anxiety and a left-sided pelvic kidney presents with abdominal pain  Patient reports that this began suddenly about an hour prior to arrival   She is having left lower quadrant abdominal pain and right-sided flank pain  She states that it has been constant and sharp  Patient is very uncomfortable secondary to the pain  She has not had any fever, chills nausea, vomiting, or diarrhea  Patient has not had any recent urinary symptoms  She is unsure of when her last menstrual period was  She did not try any medications for her symptoms  She has never had any pain like this before  She has not been able to eat or drink and he secondary to the pain  Prior to Admission Medications   Prescriptions Last Dose Informant Patient Reported? Taking?    albuterol (PROVENTIL HFA,VENTOLIN HFA) 90 mcg/act inhaler  Self Yes No   Sig: TAKE 2 PUFFS BY MOUTH EVERY 6 HOURS AS NEEDED FOR WHEEZE OR FOR SHORTNESS OF BREATH   hydrOXYzine HCL (ATARAX) 25 mg tablet   No No   Sig: TAKE 1 TABLET BY MOUTH EVERY 6 HOURS AS NEEDED FOR ANXIETY   magnesium Oxide (MAG-OX) 400 mg TABS   No No   Sig: TAKE 1 TABLET (400 MG TOTAL) BY MOUTH 2 TIMES A DAY   Patient not taking: No sig reported   ondansetron (ZOFRAN-ODT) 4 mg disintegrating tablet  Self Yes No   Sig: TAKE 1 TABLET BY MOUTH EVERY 8 HOURS AS NEEDED FOR NAUSEA AND VOMITING   propranolol (INDERAL) 10 mg tablet   No No   Sig: TAKE 1 TABLET BY MOUTH THREE TIMES A DAY   sertraline (ZOLOFT) 100 mg tablet   No No   Sig: Take 2 tablets (200 mg total) by mouth daily at bedtime   zolpidem (AMBIEN) 5 mg tablet   No No   Sig: Take 1 tablet (5 mg total) by mouth daily at bedtime as needed for sleep Facility-Administered Medications: None       Past Medical History:   Diagnosis Date    Depression     History of positive PPD     Known health problems: none     Pelvic kidney     Pelvic kidney     left side       Past Surgical History:   Procedure Laterality Date    APPENDECTOMY      HI COLPOSCOPY,CERVIX W/ADJ VAG,W/LOOP BX N/A 7/31/2020    Procedure: BIOPSY CONE/COLD KNIFE CERVIX, ECC;  Surgeon: Leobardo Hoyos MD;  Location: [facility] OR;  Service: Gynecology       Family History   Problem Relation Age of Onset    Crohn's disease Brother     Crohn's disease Maternal Aunt     Diabetes Maternal Grandmother     Diabetes Maternal Grandfather     Diabetes Paternal Grandmother     Diabetes Paternal Grandfather     COPD Mother     Diabetes Father     Hypertension Father     Depression Sister      I have reviewed and agree with the history as documented  E-Cigarette/Vaping    E-Cigarette Use Never User      E-Cigarette/Vaping Substances    Nicotine No     THC No     CBD No     Flavoring No     Other No     Unknown No      Social History     Tobacco Use    Smoking status: Current Every Day Smoker     Packs/day: 0 25     Types: Cigarettes    Smokeless tobacco: Never Used    Tobacco comment: 4 cig / day    Vaping Use    Vaping Use: Never used   Substance Use Topics    Alcohol use: Yes     Comment: Weekends only    Drug use: Never        Review of Systems   Constitutional: Negative for chills, fatigue and fever  HENT: Negative for congestion, rhinorrhea and sore throat  Eyes: Negative for pain and redness  Respiratory: Negative for cough, chest tightness, shortness of breath and wheezing  Cardiovascular: Negative for chest pain and palpitations  Gastrointestinal: Positive for abdominal pain  Negative for diarrhea, nausea and vomiting  Endocrine: Negative  Genitourinary: Positive for flank pain  Negative for difficulty urinating, hematuria and vaginal bleeding  Musculoskeletal: Negative for back pain and myalgias  Skin: Negative for pallor and rash  Allergic/Immunologic: Negative  Neurological: Negative for dizziness, weakness, light-headedness and headaches  Hematological: Negative  Physical Exam  ED Triage Vitals [08/02/22 1404]   Temperature Pulse Respirations Blood Pressure SpO2   98 °F (36 7 °C) 105 20 119/79 100 %      Temp Source Heart Rate Source Patient Position - Orthostatic VS BP Location FiO2 (%)   Temporal Monitor Sitting Left arm --      Pain Score       10 - Worst Possible Pain             Orthostatic Vital Signs  Vitals:    08/02/22 2156 08/02/22 2317 08/02/22 2319 08/03/22 0545   BP: (!) 106/43 (!) 103/46 (!) 103/46 102/51   Pulse:  71 69 66   Patient Position - Orthostatic VS:           Physical Exam  Vitals and nursing note reviewed  Constitutional:       Appearance: Normal appearance  She is well-developed  Comments: Uncomfortable appearing   HENT:      Head: Normocephalic and atraumatic  Eyes:      Conjunctiva/sclera: Conjunctivae normal    Cardiovascular:      Rate and Rhythm: Normal rate and regular rhythm  Pulmonary:      Effort: Pulmonary effort is normal  No respiratory distress  Abdominal:      General: Abdomen is flat  There is no distension  Palpations: Abdomen is soft  Tenderness: There is abdominal tenderness in the left lower quadrant  There is right CVA tenderness  There is no guarding or rebound  Musculoskeletal:         General: Normal range of motion  Cervical back: Normal range of motion and neck supple  Skin:     General: Skin is warm and dry  Neurological:      General: No focal deficit present  Mental Status: She is alert and oriented to person, place, and time           ED Medications  Medications   enoxaparin (LOVENOX) subcutaneous injection 80 mg (80 mg Subcutaneous Given 8/2/22 2303)   acetaminophen (TYLENOL) tablet 650 mg (650 mg Oral Given 8/3/22 9760)   oxyCODONE (ROXICODONE) IR tablet 5 mg (has no administration in time range)   oxyCODONE (ROXICODONE) immediate release tablet 10 mg (10 mg Oral Given 8/3/22 0637)   HYDROmorphone (DILAUDID) injection 0 5 mg (0 5 mg Intravenous Given 8/2/22 2206)   ondansetron (ZOFRAN) injection 4 mg (4 mg Intravenous Given 8/2/22 1521)   ketorolac (TORADOL) injection 15 mg (15 mg Intravenous Given 8/2/22 1533)   HYDROmorphone (DILAUDID) injection 0 4 mg (0 4 mg Intravenous Given 8/2/22 1622)   HYDROmorphone (DILAUDID) injection 0 6 mg (0 6 mg Intravenous Given 8/2/22 1750)       Diagnostic Studies  Results Reviewed     Procedure Component Value Units Date/Time    Blood culture [[id number]] Collected: 08/02/22 1907    Lab Status: Preliminary result Specimen: Blood from Hand, Left Updated: 08/02/22 2304     Blood Culture Received in Microbiology Lab  Culture in Progress  Blood culture [[id number]] Collected: 08/02/22 1907    Lab Status: Preliminary result Specimen: Blood from Arm, Left Updated: 08/02/22 2304     Blood Culture Received in Microbiology Lab  Culture in Progress  FLU/RSV/COVID - if FLU/RSV clinically relevant [[id number]]  (Normal) Collected: 08/02/22 1946    Lab Status: Final result Specimen: Nares from Nose Updated: 08/02/22 2052     SARS-CoV-2 Negative     INFLUENZA A PCR Negative     INFLUENZA B PCR Negative     RSV PCR Negative    Narrative:      FOR PEDIATRIC PATIENTS - copy/paste COVID Guidelines URL to browser: [email or web address]    SARS-CoV-2 assay is a Nucleic Acid Amplification assay intended for the  qualitative detection of nucleic acid from SARS-CoV-2 in nasopharyngeal  swabs  Results are for the presumptive identification of SARS-CoV-2 RNA  Positive results are indicative of infection with SARS-CoV-2, the virus  causing COVID-19, but do not rule out bacterial infection or co-infection  with other viruses   Laboratories within the [place] and its  territories are required to report all positive results to the appropriate  public health authorities  Negative results do not preclude SARS-CoV-2  infection and should not be used as the sole basis for treatment or other  patient management decisions  Negative results must be combined with  clinical observations, patient history, and epidemiological information  This test has not been FDA cleared or approved  This test has been authorized by FDA under an Emergency Use Authorization  (EUA)  This test is only authorized for the duration of time the  declaration that circumstances exist justifying the authorization of the  emergency use of an in vitro diagnostic tests for detection of SARS-CoV-2  virus and/or diagnosis of COVID-19 infection under section 564(b)(1) of  the Act, 21 U  S C  451OJJ-3(O)(8), unless the authorization is terminated  or revoked sooner  The test has been validated but independent review by FDA  and CLIA is pending  Test performed using [employer] GeneXpert: This RT-PCR assay targets N2,  a region unique to SARS-CoV-2  A conserved region in the E-gene was chosen  for pan-Sarbecovirus detection which includes SARS-CoV-2  Mariah Fair [[id number]]  (Normal) Collected: 08/02/22 1859    Lab Status: Final result Specimen: Blood from Arm, Left Updated: 08/02/22 1938     Protime 13 1 seconds      INR 0 97    APTT [[id number]]  (Normal) Collected: 08/02/22 1859    Lab Status: Final result Specimen: Blood from Arm, Left Updated: 08/02/22 1938     PTT 26 seconds     Fibrinogen [[id number]]  (Normal) Collected: 08/02/22 1859    Lab Status: Final result Specimen: Blood from Arm, Left Updated: 08/02/22 1938     Fibrinogen 376 mg/dL     Chlamydia/GC amplified DNA by PCR [[id number]] Collected: 08/02/22 1859    Lab Status: In process Updated: 08/02/22 1921    Urine culture [[id number]] Collected: 08/02/22 1842    Lab Status:  In process Specimen: Urine Updated: 08/02/22 1842    Comprehensive metabolic panel [[id number]]  (Abnormal) Collected: 08/02/22 1520    Lab Status: Final result Specimen: Blood from Arm, Right Updated: 08/02/22 1556     Sodium 139 mmol/L      Potassium 3 5 mmol/L      Chloride 111 mmol/L      CO2 26 mmol/L      ANION GAP 2 mmol/L      BUN 9 mg/dL      Creatinine 0 82 mg/dL      Glucose 120 mg/dL      Calcium 9 3 mg/dL      AST 17 U/L      ALT 28 U/L      Alkaline Phosphatase 78 U/L      Total Protein 6 9 g/dL      Albumin 3 7 g/dL      Total Bilirubin 0 34 mg/dL      eGFR 94 ml/min/1 73sq m     Narrative:      [place] guidelines for Chronic Kidney Disease (CKD):     Stage 1 with normal or high GFR (GFR > 90 mL/min/1 73 square meters)    Stage 2 Mild CKD (GFR = 60-89 mL/min/1 73 square meters)    Stage 3A Moderate CKD (GFR = 45-59 mL/min/1 73 square meters)    Stage 3B Moderate CKD (GFR = 30-44 mL/min/1 73 square meters)    Stage 4 Severe CKD (GFR = 15-29 mL/min/1 73 square meters)    Stage 5 End Stage CKD (GFR <15 mL/min/1 73 square meters)  Note: GFR calculation is accurate only with a steady state creatinine    Lipase [[id number]]  (Normal) Collected: 08/02/22 1520    Lab Status: Final result Specimen: Blood from Arm, Right Updated: 08/02/22 1555     Lipase 245 u/L     UA w Reflex to Microscopic w Reflex to Culture [[id number]] Collected: 08/02/22 1527    Lab Status: Final result Specimen: Urine, Clean Catch Updated: 08/02/22 1540     Color, UA Light Yellow     Clarity, UA Clear     Specific Gravity, UA 1 018     pH, UA 6 5     Leukocytes, UA Negative     Nitrite, UA Negative     Protein, UA Negative mg/dl      Glucose, UA Negative mg/dl      Ketones, UA Negative mg/dl      Urobilinogen, UA <2 0 mg/dl      Bilirubin, UA Negative     Occult Blood, UA Negative    CBC and differential [[id number]] Collected: 08/02/22 1520    Lab Status: Final result Specimen: Blood from Arm, Right Updated: 08/02/22 1534     WBC 9 42 Thousand/uL      RBC 4 42 Million/uL Hemoglobin 13 8 g/dL      Hematocrit 42 3 %      MCV 96 fL      MCH 31 2 pg      MCHC 32 6 g/dL      RDW 12 3 %      MPV 10 8 fL      Platelets 947 Thousands/uL      nRBC 0 /100 WBCs      Neutrophils Relative 75 %      Immat GRANS % 0 %      Lymphocytes Relative 15 %      Monocytes Relative 7 %      Eosinophils Relative 2 %      Basophils Relative 1 %      Neutrophils Absolute 7 15 Thousands/µL      Immature Grans Absolute 0 04 Thousand/uL      Lymphocytes Absolute 1 37 Thousands/µL      Monocytes Absolute 0 65 Thousand/µL      Eosinophils Absolute 0 16 Thousand/µL      Basophils Absolute 0 05 Thousands/µL     POCT pregnancy, urine [[id number]]  (Normal) Resulted: 08/02/22 1532    Lab Status: Final result Updated: 08/02/22 1532     EXT PREG TEST UR (Ref: Negative) Negative     Control Valid                 CT renal stone study abdomen pelvis wo contrast   Final Result by Sita Perea MD (08/02 1713)      1  Findings of acute right ovarian vein thrombosis, with hyperdense material expanding the vein to the level of the IVC and associated perivascular inflammatory stranding  The thrombus does not appear to extend to the IVC but is incompletely evaluated    on a noncontrast exam  Otherwise no acute findings in the abdomen or pelvis  2   No obstructive uropathy  There are punctate nonobstructive stones versus small vascular calcifications in the ectopic left iliac kidney  3   Stable malpositioning of intrauterine contraceptive device  I personally discussed this study with Adrianna De Leon on 8/2/2022 at 5:00 PM                Workstation performed: [id number]         XR chest pa & lateral    (Results Pending)         Procedures  Procedures      ED Course  ED Course as of 08/03/22 0657   e Aug 02, 2022   1710 Radiology tested that patient has acute thrombosis of the right ovarian vein  Gynecology texted for further recommendations  [facility] [place] [facility] [place]   Will see pt MDM  Number of Diagnoses or Management Options  Thrombosis of ovarian vein: new and requires workup  Diagnosis management comments: [de-identified] year female with past medical history of a left-sided pelvic kidney presents with sudden onset abdominal pain and flank pain  Patient is uncomfortable and very tender on exam   Check labs, urine, and CT abdomen pelvis  Will give Toradol and Zofran and re-evaluate  Amount and/or Complexity of Data Reviewed  Clinical lab tests: ordered and reviewed  Tests in the radiology section of CPT®: ordered and reviewed  Review and summarize past medical records: yes  Discuss the patient with other providers: yes    Risk of Complications, Morbidity, and/or Mortality  Presenting problems: moderate  Diagnostic procedures: moderate  Management options: moderate    Patient Progress  Patient progress: improved    Patient was admitted to [facility] [place] for further workup and management  Disposition  Final diagnoses: Thrombosis of ovarian vein - right     Time reflects when diagnosis was documented in both MDM as applicable and the Disposition within this note     Time User Action Codes Description Comment    8/2/2022  7:25 PM Pippa Gardner Add [E43 732] Thrombosis of ovarian vein     8/2/2022  7:49 PM Vee Garcia Modify [P09 337] Thrombosis of ovarian vein     8/2/2022  7:49 PM Vee Garcia Modify [T74 [id number]] Thrombosis of ovarian vein right      ED Disposition     ED Disposition   Admit    Condition   Stable    Date/Time   Tue Aug 2, 2022  7:48 PM    Comment   Case was discussed with Dr Yoko Patricio, and the patient's admission status was agreed to be Admission Status: inpatient status to the service of Dr Geovanni Peña             Follow-up Information    None         Current Discharge Medication List      CONTINUE these medications which have NOT CHANGED    Details   albuterol (PROVENTIL HFA,VENTOLIN HFA) 90 mcg/act inhaler TAKE 2 PUFFS BY MOUTH EVERY 6 HOURS AS NEEDED FOR WHEEZE OR FOR SHORTNESS OF BREATH      hydrOXYzine HCL (ATARAX) 25 mg tablet TAKE 1 TABLET BY MOUTH EVERY 6 HOURS AS NEEDED FOR ANXIETY  Qty: 30 tablet, Refills: 0    Associated Diagnoses: RADHA (generalized anxiety disorder)      magnesium Oxide (MAG-OX) 400 mg TABS TAKE 1 TABLET (400 MG TOTAL) BY MOUTH 2 TIMES A DAY  Qty: 60 tablet, Refills: 2    Associated Diagnoses: Tension headache      ondansetron (ZOFRAN-ODT) 4 mg disintegrating tablet TAKE 1 TABLET BY MOUTH EVERY 8 HOURS AS NEEDED FOR NAUSEA AND VOMITING      propranolol (INDERAL) 10 mg tablet TAKE 1 TABLET BY MOUTH THREE TIMES A DAY  Qty: 90 tablet, Refills: 0    Associated Diagnoses: RADHA (generalized anxiety disorder); Tension headache      sertraline (ZOLOFT) 100 mg tablet Take 2 tablets (200 mg total) by mouth daily at bedtime  Qty: 90 tablet, Refills: 2    Associated Diagnoses: RADHA (generalized anxiety disorder); Current moderate episode of major depressive disorder, unspecified whether recurrent ([facility])      zolpidem (AMBIEN) 5 mg tablet Take 1 tablet (5 mg total) by mouth daily at bedtime as needed for sleep  Qty: 30 tablet, Refills: 0    Associated Diagnoses: Adjustment insomnia           No discharge procedures on file  PDMP Review       Value Time User    PDMP Reviewed  Yes 7/7/2022  6:03 PM Yuliana Crump MD           ED Provider  Attending physically available and evaluated Joseleeann Teixeira  ABRAHAM managed the patient along with the ED Attending      Electronically Signed by         Lida Stokes DO  08/03/22 3839

## 2022-08-02 NOTE — H&P
For questions/concerns on this patient, please reach out to the following:  [facility]       H&P Exam - Gynecology  Valerie Parsons 28 y o  female MRN: [id number]  Unit/Bed#: ED 01 Encounter: [id number]        Assessment/Plan      Problem List        Cardiovascular and Mediastinum    * (Principal) Thrombosis of ovarian vein    Overview     Patient presented with sudden onset of abdominal pain and CTAP is suggestive of ovarian vein thrombosis  This is a rare finding, particuarly in the absence of pregnancy, recent postpartum state or infection  Findings of acute right ovarian vein thrombosis, with hyperdense material expanding the vein to the level of the IVC and associated perivascular inflammatory stranding  The thrombus does not appear to extend to the IVC but is incompletely evaluated on a noncontrast exam  Otherwise no acute findings in the abdomen or pelvis  Current Assessment & Plan     · Patient is afebrile with normal vitals and a normal WBC  Infection therefore does not seem likely  However, will evaluate blood culture, urine culture and CXR to evaluate for other sources of infection  · PT, PTT and Fibrinogen to asess coagulation status  · Hematology consult  · Therapeutic Lovenox 1mg/kg bid will be started to treat thrombosis  · Regular diet            Genitourinary    Pelvic kidney    Current Assessment & Plan     · Patient has never had ramifications or seen anyone for this  It was first noted during laparoscopy as per patient  · Creatinine and GFR WNL          IUD threads lost    Current Assessment & Plan     · Henrine Avella IUD placed in 2019 for AUB  · IUD is malpositioned in the uterus but appears to be functional for contraception  Abdominal pain    Current Assessment & Plan     · Schedule Tylenol with Roxicodone 5/10 for moderate and severe pain  · Zofran PRN             Management discussed with Dr Constanza Ramey       History of Present Illness       [phone]    HPI:  Kings Guzman is a 28 y o  [id number] female who presents with acute onset abdominal pain  Yesterday, she had an episode of nausea and vomiting at work and was sent home early  This morning she was feeling better and was at the grocery store, when she had sudden onset sharp, "pinching" abdominal pain supra-pubically that wrapped around to her back  The flank pain is more of an intense aching pain  The patient states that she has never had pain like this in the past  She continues to have intermittent pain accompanied by nausea secondary to the intensity of the pain  The patient does note feeling fever and chills yesterday  She does have a remote history of sexually transmitted infections, however she recently had an annual GYN exam and was negative for all STIs with a negative urinalysis  She denies any other recent illness or sick exposures  The patient had four uncomplicated vaginal deliveries  The patient did have several sexually transmitted infections, but could not specify which ones specifically  She also had pyelonephritis in her third pregnancy  The patient has a history of a left pelvic kidney that was first identified on laparoscopy for an appendectomy and has been seen on earlier imaging  The patient has a [place] IUD that has been in place for 3 years  It was initially placed for management of abnormal uterine bleeding as well as contraception  It has been known to be malpositioned for several years as seen on earlier imaging  The patient has a history of abnormal cervical cytology and HPV 16  She had a cold knife cone in 2020, which resulted in MESFIN 3 with negative margins  She has had no other gynecologic surgery, although a hysterectomy had been planned in 2020 for AUB, but was never scheduled  The patient denies any history of bleeding or clotting disorders  Her maternal aunt and grandmother have histories of DVT and stroke   The patient has a history of anxiety, depression and insomnia  Review of Systems   Constitutional: Positive for activity change, appetite change, chills and fever  HENT: Positive for congestion  Negative for postnasal drip, rhinorrhea, sinus pain, sneezing and sore throat  Eyes: Negative  Respiratory: Positive for shortness of breath  Negative for cough, chest tightness and wheezing  Cardiovascular: Positive for chest pain  Negative for palpitations and leg swelling  Gastrointestinal: Positive for abdominal pain, nausea and vomiting  Negative for abdominal distention, constipation and diarrhea  Endocrine: Negative  Genitourinary: Positive for flank pain  Negative for difficulty urinating, dyspareunia, dysuria, frequency, hematuria, menstrual problem, vaginal bleeding, vaginal discharge and vaginal pain  Skin: Negative for color change, pallor, rash and wound  Allergic/Immunologic: Negative  Neurological: Positive for weakness  Negative for dizziness, syncope and light-headedness  Hematological: Negative  Psychiatric/Behavioral: Negative for agitation and suicidal ideas  The patient is nervous/anxious          Historical Information   Past Medical History:   Diagnosis Date    Depression     History of positive PPD     Known health problems: none     Pelvic kidney     Pelvic kidney     left side     Past Surgical History:   Procedure Laterality Date    APPENDECTOMY      NV COLPOSCOPY,CERVIX W/ADJ VAG,W/LOOP BX N/A 2020    Procedure: BIOPSY CONE/COLD KNIFE CERVIX, ECC;  Surgeon: Wilmer Blackburn MD;  Location: [facility];  Service: Gynecology     OB History    Para Term  AB Living   4 4 3 1   4   SAB IAB Ectopic Multiple Live Births         0 4      # Outcome Date GA Lbr Oscar/2nd Weight Sex Delivery Anes PTL Lv   4 Term 16 40w1d / 00:08 3005 g (6 lb 10 oz) F Vag-Spont None N QUYNH   3 Term 04/10/15 39w2d  2920 g (6 lb 7 oz) M Vag-Spont  N QUYNH   2  13 36w0d  2268 g (5 lb) F Vag-Spont Y QUYNH      Complications: IUGR (intrauterine growth restriction) affecting care of mother, Pyelonephritis   1 Term 06/14/09 37w0d  2665 g (5 lb 14 oz) M Vag-Spont  N QUYNH     Family History   Problem Relation Age of Onset    Crohn's disease Brother     Crohn's disease Maternal Aunt     Diabetes Maternal Grandmother     Diabetes Maternal Grandfather     Diabetes Paternal Grandmother     Diabetes Paternal Grandfather     COPD Mother     Diabetes Father     Hypertension Father     Depression Sister      Social History   Social History     Substance and Sexual Activity   Alcohol Use Yes    Comment: Weekends only     Social History     Substance and Sexual Activity   Drug Use Never     Social History     Tobacco Use   Smoking Status Current Every Day Smoker    Packs/day: 0 25    Types: Cigarettes   Smokeless Tobacco Never Used   Tobacco Comment    4 cig / day        Meds/Allergies   (Not in a hospital admission)    Allergies   Allergen Reactions    Metoclopramide Hives       Objective     /62   Pulse 76   Temp 98 °F (36 7 °C) (Temporal)   Resp 18   LMP 07/20/2022 (Exact Date)   SpO2 97%     No intake/output data recorded  No intake/output data recorded  Lab Results   Component Value Date    WBC 9 42 08/02/2022    HGB 13 8 08/02/2022    HCT 42 3 08/02/2022    MCV 96 08/02/2022     08/02/2022       Lab Results   Component Value Date    GLUCOSE 88 09/22/2016    CALCIUM 9 3 08/02/2022     (L) 04/10/2015    K 3 5 08/02/2022    CO2 26 08/02/2022     (H) 08/02/2022    BUN 9 08/02/2022    CREATININE 0 82 08/02/2022       Physical Exam  Constitutional:       General: She is in acute distress  Appearance: Normal appearance  She is normal weight  She is not ill-appearing, toxic-appearing or diaphoretic  HENT:      Head: Normocephalic and atraumatic  Cardiovascular:      Rate and Rhythm: Normal rate and regular rhythm  Pulses: Normal pulses        Heart sounds: Normal heart sounds  No murmur heard  No friction rub  No gallop  Pulmonary:      Effort: Pulmonary effort is normal  No respiratory distress  Breath sounds: Normal breath sounds  No wheezing  Abdominal:      General: Abdomen is flat  Bowel sounds are normal  There is no distension  Palpations: Abdomen is soft  There is no mass  Tenderness: There is abdominal tenderness in the right lower quadrant, suprapubic area and left lower quadrant  There is right CVA tenderness  There is no guarding or rebound  Hernia: No hernia is present  Genitourinary:     Labia:         Right: No rash, tenderness, lesion or injury  Left: No rash, tenderness, lesion or injury  Vagina: Tenderness present  Cervix: No discharge, lesion or cervical bleeding  Uterus: Tender  Not enlarged and not fixed  Adnexa:         Right: No mass, tenderness or fullness  Left: No mass, tenderness or fullness  Comments: IUD strings not visualized  Diffuse pelvic tenderness, difficult to assess for CMT because of diffuse tenderness  Suprapubic tenderness is most extreme   Musculoskeletal:         General: No swelling, tenderness, deformity or signs of injury  Right lower leg: No edema  Left lower leg: No edema  Skin:     General: Skin is warm and dry  Coloration: Skin is pale  Skin is not jaundiced  Findings: No erythema or rash  Neurological:      General: No focal deficit present  Mental Status: She is alert and oriented to person, place, and time  Psychiatric:         Mood and Affect: Mood normal          Behavior: Behavior normal          Thought Content: Thought content normal          Judgment: Judgment normal        Imaging:     CT renal stone study abdomen pelvis wo contrast    Result Date: 8/2/2022  Impression: 1    Findings of acute right ovarian vein thrombosis, with hyperdense material expanding the vein to the level of the IVC and associated perivascular inflammatory stranding  The thrombus does not appear to extend to the IVC but is incompletely evaluated on a noncontrast exam  Otherwise no acute findings in the abdomen or pelvis  2   No obstructive uropathy  There are punctate nonobstructive stones versus small vascular calcifications in the ectopic left iliac kidney  3   Stable malpositioning of intrauterine contraceptive device       Code Status: Full Code    Gabrielle Puga MD  8/2/2022  6:44 PM

## 2022-08-03 ENCOUNTER — TELEPHONE (OUTPATIENT)
Dept: OBGYN CLINIC | Facility: CLINIC | Age: 32
End: 2022-08-03

## 2022-08-03 ENCOUNTER — TELEPHONE (OUTPATIENT)
Dept: HEMATOLOGY ONCOLOGY | Facility: CLINIC | Age: 32
End: 2022-08-03

## 2022-08-03 ENCOUNTER — APPOINTMENT (INPATIENT)
Dept: RADIOLOGY | Facility: HOSPITAL | Age: 32
DRG: 197 | End: 2022-08-03
Payer: MEDICARE

## 2022-08-03 LAB
ATRIAL RATE: 69 BPM
C TRACH DNA SPEC QL NAA+PROBE: NEGATIVE
N GONORRHOEA DNA SPEC QL NAA+PROBE: NEGATIVE
P AXIS: 54 DEGREES
PR INTERVAL: 132 MS
QRS AXIS: 68 DEGREES
QRSD INTERVAL: 92 MS
QT INTERVAL: 410 MS
QTC INTERVAL: 439 MS
T WAVE AXIS: 48 DEGREES
VENTRICULAR RATE: 69 BPM

## 2022-08-03 PROCEDURE — 93005 ELECTROCARDIOGRAM TRACING: CPT

## 2022-08-03 PROCEDURE — 99232 SBSQ HOSP IP/OBS MODERATE 35: CPT | Performed by: STUDENT IN AN ORGANIZED HEALTH CARE EDUCATION/TRAINING PROGRAM

## 2022-08-03 PROCEDURE — 71275 CT ANGIOGRAPHY CHEST: CPT

## 2022-08-03 PROCEDURE — 74177 CT ABD & PELVIS W/CONTRAST: CPT

## 2022-08-03 PROCEDURE — 99222 1ST HOSP IP/OBS MODERATE 55: CPT | Performed by: INTERNAL MEDICINE

## 2022-08-03 PROCEDURE — 93010 ELECTROCARDIOGRAM REPORT: CPT | Performed by: INTERNAL MEDICINE

## 2022-08-03 RX ORDER — SERTRALINE HYDROCHLORIDE 100 MG/1
200 TABLET, FILM COATED ORAL
Status: DISCONTINUED | OUTPATIENT
Start: 2022-08-03 | End: 2022-08-04 | Stop reason: HOSPADM

## 2022-08-03 RX ORDER — GABAPENTIN 300 MG/1
300 CAPSULE ORAL 3 TIMES DAILY
Status: DISCONTINUED | OUTPATIENT
Start: 2022-08-03 | End: 2022-08-04 | Stop reason: HOSPADM

## 2022-08-03 RX ORDER — HYDROMORPHONE HCL/PF 1 MG/ML
0.5 SYRINGE (ML) INJECTION
Status: DISCONTINUED | OUTPATIENT
Start: 2022-08-03 | End: 2022-08-03

## 2022-08-03 RX ORDER — ALBUTEROL SULFATE 90 UG/1
2 AEROSOL, METERED RESPIRATORY (INHALATION) EVERY 4 HOURS PRN
Status: DISCONTINUED | OUTPATIENT
Start: 2022-08-03 | End: 2022-08-04 | Stop reason: HOSPADM

## 2022-08-03 RX ORDER — PROPRANOLOL HYDROCHLORIDE 10 MG/1
10 TABLET ORAL 3 TIMES DAILY
Status: DISCONTINUED | OUTPATIENT
Start: 2022-08-03 | End: 2022-08-04 | Stop reason: HOSPADM

## 2022-08-03 RX ORDER — ONDANSETRON 2 MG/ML
4 INJECTION INTRAMUSCULAR; INTRAVENOUS EVERY 4 HOURS PRN
Status: DISCONTINUED | OUTPATIENT
Start: 2022-08-03 | End: 2022-08-04 | Stop reason: HOSPADM

## 2022-08-03 RX ADMIN — ACETAMINOPHEN 650 MG: 325 TABLET ORAL at 05:35

## 2022-08-03 RX ADMIN — ONDANSETRON 4 MG: 2 INJECTION INTRAMUSCULAR; INTRAVENOUS at 19:51

## 2022-08-03 RX ADMIN — OXYCODONE HYDROCHLORIDE 10 MG: 10 TABLET ORAL at 19:55

## 2022-08-03 RX ADMIN — ENOXAPARIN SODIUM 80 MG: 80 INJECTION SUBCUTANEOUS at 09:04

## 2022-08-03 RX ADMIN — IOHEXOL 100 ML: 350 INJECTION, SOLUTION INTRAVENOUS at 14:14

## 2022-08-03 RX ADMIN — OXYCODONE HYDROCHLORIDE 10 MG: 10 TABLET ORAL at 10:36

## 2022-08-03 RX ADMIN — GABAPENTIN 300 MG: 300 CAPSULE ORAL at 12:14

## 2022-08-03 RX ADMIN — ACETAMINOPHEN 650 MG: 325 TABLET ORAL at 17:36

## 2022-08-03 RX ADMIN — ACETAMINOPHEN 650 MG: 325 TABLET ORAL at 22:20

## 2022-08-03 RX ADMIN — SERTRALINE 200 MG: 100 TABLET, FILM COATED ORAL at 22:20

## 2022-08-03 RX ADMIN — ACETAMINOPHEN 650 MG: 325 TABLET ORAL at 10:36

## 2022-08-03 RX ADMIN — OXYCODONE HYDROCHLORIDE 10 MG: 10 TABLET ORAL at 06:37

## 2022-08-03 RX ADMIN — RIVAROXABAN 15 MG: 15 TABLET, FILM COATED ORAL at 17:37

## 2022-08-03 RX ADMIN — ONDANSETRON 4 MG: 2 INJECTION INTRAMUSCULAR; INTRAVENOUS at 10:36

## 2022-08-03 RX ADMIN — GABAPENTIN 300 MG: 300 CAPSULE ORAL at 22:20

## 2022-08-03 RX ADMIN — HYDROMORPHONE HYDROCHLORIDE 0.5 MG: 1 INJECTION, SOLUTION INTRAMUSCULAR; INTRAVENOUS; SUBCUTANEOUS at 08:59

## 2022-08-03 RX ADMIN — HYDROMORPHONE HYDROCHLORIDE 0.5 MG: 1 INJECTION, SOLUTION INTRAMUSCULAR; INTRAVENOUS; SUBCUTANEOUS at 12:21

## 2022-08-03 RX ADMIN — OXYCODONE HYDROCHLORIDE 10 MG: 10 TABLET ORAL at 15:39

## 2022-08-03 RX ADMIN — GABAPENTIN 300 MG: 300 CAPSULE ORAL at 15:42

## 2022-08-03 RX ADMIN — OXYCODONE HYDROCHLORIDE 10 MG: 10 TABLET ORAL at 01:55

## 2022-08-03 NOTE — TELEPHONE ENCOUNTER
New Patient Intake Form   Patient Details:    John Gonzáles  1990    Appointment Information   Who is calling to schedule? Kyara Nguyen   If not self, what is the caller's name? NA   DID YOU CONFIRM INSURANCE WITH PATIENT? Routed to Finance   Referring provider Dr Morris Zelaya   What is the diagnosis? Ovarian vein thrombosis     Is there a confirmed tissue diagnosis? NA     Is there a biopsy ordered or pending? Please specify dates  If yes, route to NN/OCC   NA     Is patient aware of diagnosis? Yes     Have you had any imaging or labs done? If yes, where? (If imaging done outside of [facility], please remind patient to bring a disk ) Yes-[facility]     If imaging done at outside facility, did you instruct patient to obtain discs and bring to visit? NA   Have you been seen by another Oncologist/Hematologist?  If so, who and where? No   Are the records in [facility] or Care Everywhere? Yes   Does the patient have records at another facility/hospital?    If yes, Name of facility, city and state where facility is located  No     Did you instruct patient to have records faxed to [facility] and provide rightfax number? NA   Preferred Comfort   Is the patient willing to be seen by another provider? (This is for breast patients only) NA     Did you send new patient paperwork? Email or mail? NA   Miscellaneous Information: The patient is scheduled for a HFU appointment with Dr Brianna Barrett on 10/7 in the [facility] office at [phone]

## 2022-08-03 NOTE — CASE MANAGEMENT
Case Management Assessment & Discharge Planning Note    Patient name Harley [place]  Location 2 218/CW2 218-01 MRN [id number]  : 1990 Date 8/3/2022       Current Admission Date: 2022  Current Admission Diagnosis: Thrombosis of ovarian vein   Patient Active Problem List    Diagnosis Date Noted    Thrombosis of ovarian vein 2022    Abdominal pain 2022    Tension headache 04/15/2022    Overweight (BMI 25 0-29 9) 2022    RADHA (generalized anxiety disorder) 2022    Adjustment insomnia 2022    Abnormal uterine bleeding 2020    Pelvic kidney 2020    HGSIL (high grade squamous intraepithelial lesion) on Pap smear of cervix 2020    IUD threads lost 2020    Major depressive disorder 2020    Spontaneous vaginal delivery 2016      LOS (days): 1  Geometric Mean LOS (GMLOS) (days):   Days to GMLOS:     OBJECTIVE:    Risk of Unplanned Readmission Score: 7 93         Current admission status: Inpatient       Preferred Pharmacy:   [id number]  [address] [place] [address]  Phone: [phone] Fax: [place] [address] [address] [facility]  Phone: [phone] Fax: [phone]    Primary Care Provider: Obey Akbar MD    Primary Insurance: Latasha Gomez [place] [facility]  Secondary Insurance:     ASSESSMENT:  Eileen 26 Proxies    There are no active Health Care Proxies on file  Advance Directives  Does patient have a Health Care POA?: Yes  Does patient have Advance Directives?: Yes  Advance Directives: Living will, Power of  for health care (Chantell Chow [phone])  Primary Contact:  Chantell Chow [phone]         Readmission Root Cause  30 Day Readmission: No    Patient Information  Admitted from[de-identified] Home  Mental Status: Alert  During Assessment patient was accompanied by: Spouse, Other-Comment (4 young children)  Assessment information provided by[de-identified] Patient  Primary Caregiver: Self  Support Systems: Spouse/significant other, Children  What city do you live in?: [facility] entry access options   Select all that apply : No steps to enter home  Type of Current Residence: 2 story home  Upon entering residence, is there a bedroom on the main floor (no further steps)?: No  A bedroom is located on the following floor levels of residence (select all that apply):: 2nd Floor  Number of steps to 2nd floor from main floor: One Flight  In the last 12 months, was there a time when you were not able to pay the mortgage or rent on time?: No  In the last 12 months, how many places have you lived?: 1  In the last 12 months, was there a time when you did not have a steady place to sleep or slept in a shelter (including now)?: No  Homeless/housing insecurity resource given?: N/A  Living Arrangements: Lives w/ Spouse/significant other, Other (Comment) (4 children)    Activities of Daily Living Prior to Admission  Functional Status: Independent  Completes ADLs independently?: Yes  Ambulates independently?: Yes  Does patient use assisted devices?: No  Does patient currently own DME?: No  Does patient have a history of Outpatient Therapy (PT/OT)?: No  Does the patient have a history of Short-Term Rehab?: No  Does patient have a history of HHC?: No  Does patient currently have [address]?: No         Patient Information Continued  Income Source: Employed  Does patient have prescription coverage?: Yes  Within the past 12 months, you worried that your food would run out before you got the money to buy more : Never true  Within the past 12 months, the food you bought just didn't last and you didn't have money to get more : Never true  Food insecurity resource given?: N/A  Does patient receive dialysis treatments?: No  Does patient have a history of substance abuse?: No  Does patient have a history of Mental Health Diagnosis?: Yes (treated by PCP with medications sor dpression/anxiety)  Is patient receiving treatment for mental health?: Yes  Has patient received inpatient treatment related to mental health in the last 2 years?: No    PHQ 2/9 Screening   Reviewed PHQ 2/9 Depression Screening Score?: No    Means of Transportation  Means of Transport to Appts[de-identified] Drives Self  In the past 12 months, has lack of transportation kept you from medical appointments or from getting medications?: No  In the past 12 months, has lack of transportation kept you from meetings, work, or from getting things needed for daily living?: No  Was application for public transport provided?: N/A        DISCHARGE DETAILS:    Discharge planning discussed with[de-identified] patient and spouse at bedside        CM contacted family/caregiver?: Yes             Contacts  Patient Contacts: Mother, Chacho Mahan  Relationship to Patient[de-identified] Family  Contact Method: Phone  Phone Number: [phone]  Reason/Outcome: Continuity of [address]         Is the patient interested in [place] [address] at discharge?: No    DME Referral Provided  Referral made for DME?: No       Additional Comments: Pt reports she was told by physician she will need to be on a blood thinner upon d/c  Pt does not have prescription coverage for same  After assessment this CM noted progress note stating pt to be started on Xarelto   CM will provide savings card p/t d/c

## 2022-08-03 NOTE — TELEPHONE ENCOUNTER
Attempted to call, and left a detailed message, ok per communication consent form  Informed her the pap and STI screening are negative  Provided office number in message in case she had any questions

## 2022-08-03 NOTE — CONSULTS
Hematology Consult Note  Laurie Pickett 28 y o  female MRN: [id number]  Unit/Bed#: CW2 218-01 Encounter: [id number]      Presenting Complaint: acute onset of abdominal/pelvic discomfort    History of Presenting Illness:     Ms Lauren Patel is a 28year old female with underlying HLD who is presented to [facility] yesterday with CC of acute onset of pelvic/abdominal discomfort  On Monday (8/1/22), patient woke up feeling nauseous and had small 1 episode of vomiting (NBNB) while she was at work (patient works as an [place])  After going home and getting some rest, patient started feeling better  She was doing fine yesterday morning also, however around 2 in the afternoon, patient developed a sharp pinching like pain in the pelvic area and cramping-like pain in the lower abdomen which was wrapping around to her back  Patient continued feeling nauseous throughout the day yesterday since her onset of abdominal pain, but denied any episodes of vomiting  Patient denied any dysuria, urinary frequency, hematuria, recent fevers or chills  ROS was positive for intermittent episodes of LLE tenderness/cramping for the past 1 month  Patient also reported occasional chest discomfort and CURRY in recent months, which she was attributing to her smoking (smokes 4-5 cigs daily since age 13)  Denied any recent travel or personal history of clotting disorders  Did report a history of DVT in her maternal grandmother and maternal aunt, in addition to history of cervical cancer in her maternal grandmother  Multiple family members in her maternal side also had history of stroke  Regarding gyn hx, patient has had 4 pregnancies (all vaginal deliveries, June 2009, November 2013, April 2015, and April 6348) with no complications  Patient does have an IUD Khanh Dipesh), which was initially placed in 2018, has been reported be malpositioned    Patient does have a history of abnormal Pap result several years ago, however the most recent Pap results from July 27th was normal   Denied any other recent gynecological procedures  Review of Systems - As stated in the HPI otherwise the fourteen point review of systems was negative  Past Medical History:   Diagnosis Date    Depression     History of positive PPD     Known health problems: none     Pelvic kidney     Pelvic kidney     left side       Social History     Socioeconomic History    Marital status: Single     Spouse name: None    Number of children: 4    Years of education: 15    Highest education level: High school graduate   Occupational History    None   Tobacco Use    Smoking status: Current Every Day Smoker     Packs/day: 0 25     Types: Cigarettes    Smokeless tobacco: Never Used    Tobacco comment: 4 cig / day    Vaping Use    Vaping Use: Never used   Substance and Sexual Activity    Alcohol use: Yes     Comment: Weekends only    Drug use: Never    Sexual activity: Yes     Partners: Male     Birth control/protection: I U D  Other Topics Concern    None   Social History Narrative    None     Social Determinants of Health     Financial Resource Strain: Medium Risk    Difficulty of Paying Living Expenses: Somewhat hard   Food Insecurity: Food Insecurity Present    Worried About Running Out of Food in the Last Year: Sometimes true    Asad of Food in the Last Year: Sometimes true   Transportation Needs: No Transportation Needs    Lack of Transportation (Medical): No    Lack of Transportation (Non-Medical):  No   Physical Activity: Not on file   Stress: Not on file   Social Connections: Not on file   Intimate Partner Violence: Not on file   Housing Stability: Not on file       Family History   Problem Relation Age of Onset    Crohn's disease Brother     Crohn's disease Maternal Aunt     Diabetes Maternal Grandmother     Diabetes Maternal Grandfather     Diabetes Paternal Grandmother     Diabetes Paternal Grandfather     COPD Mother     Diabetes Father    Carmenfranklyn Eller Hypertension Father     Depression Sister        Allergies   Allergen Reactions    Metoclopramide Hives         Current Facility-Administered Medications:     acetaminophen (TYLENOL) tablet 650 mg, 650 mg, Oral, Q6H [address], Trang Sung MD, 650 mg at 08/03/22 0535    enoxaparin (LOVENOX) subcutaneous injection 80 mg, 1 mg/kg, Subcutaneous, Q12H [address], Trang Sung MD, 80 mg at 08/03/22 0904    HYDROmorphone (DILAUDID) injection 0 5 mg, 0 5 mg, Intravenous, Q3H PRN, Praveen Garcia MD, 0 5 mg at 08/03/22 0859    ondansetron (ZOFRAN) injection 4 mg, 4 mg, Intravenous, Q4H PRN, Praveen Garcia MD    oxyCODONE (ROXICODONE) immediate release tablet 10 mg, 10 mg, Oral, Q4H PRN, Trang Sung MD, 10 mg at 08/03/22 6009    oxyCODONE (ROXICODONE) IR tablet 5 mg, 5 mg, Oral, Q4H PRN, Trang Sung MD      /51   Pulse 66   Temp (!) 97 2 °F (36 2 °C)   Resp 16   Ht 5' 5" (1 651 m)   Wt 74 9 kg (165 lb 3 2 oz)   LMP 07/20/2022 (Exact Date)   SpO2 96%   BMI 27 49 kg/m²       General Appearance:   NAD during my initial encounter in the morning, however patient was tachypneic appearing and actively vomiting during my 2nd counter later in the morning       Eyes:    PERRL   Ears:    Normal external ear canals, both ears   Nose:   Nares normal, septum midline   Throat:   Mucosa moist  Pharynx without injection  Neck:   Supple       Lungs:     Clear to auscultation bilaterally   Chest Wall:    No tenderness or deformity    Heart:    Regular rate and rhythm       Abdomen: Active bowel sounds, tenderness elicited with palpation of the lower abdomen, no organomegaly           Extremities:   Extremities no cyanosis or edema       Skin:   no rash or icterus      Lymph nodes:   Cervical, supraclavicular, and axillary nodes normal   Neurologic:   CNII-XII intact, normal strength, sensation and reflexes     Throughout     Recent Results (from the past 48 hour(s))   CBC and differential    Collection Time: 08/02/22 3:20 PM   Result Value Ref Range    WBC 9 42 4 31 - 10 16 Thousand/uL    RBC 4 42 3 81 - 5 12 Million/uL    Hemoglobin 13 8 11 5 - 15 4 g/dL    Hematocrit 42 3 34 8 - 46 1 %    MCV 96 82 - 98 fL    MCH 31 2 26 8 - 34 3 pg    MCHC 32 6 31 4 - 37 4 g/dL    RDW 12 3 11 6 - 15 1 %    MPV 10 8 8 9 - 12 7 fL    Platelets 328 078 - 298 Thousands/uL    nRBC 0 /100 WBCs    Neutrophils Relative 75 43 - 75 %    Immat GRANS % 0 0 - 2 %    Lymphocytes Relative 15 14 - 44 %    Monocytes Relative 7 4 - 12 %    Eosinophils Relative 2 0 - 6 %    Basophils Relative 1 0 - 1 %    Neutrophils Absolute 7 15 1 85 - 7 62 Thousands/µL    Immature Grans Absolute 0 04 0 00 - 0 20 Thousand/uL    Lymphocytes Absolute 1 37 0 60 - 4 47 Thousands/µL    Monocytes Absolute 0 65 0 17 - 1 22 Thousand/µL    Eosinophils Absolute 0 16 0 00 - 0 61 Thousand/µL    Basophils Absolute 0 05 0 00 - 0 10 Thousands/µL   Comprehensive metabolic panel    Collection Time: 08/02/22  3:20 PM   Result Value Ref Range    Sodium 139 135 - 147 mmol/L    Potassium 3 5 3 5 - 5 3 mmol/L    Chloride 111 (H) 96 - 108 mmol/L    CO2 26 21 - 32 mmol/L    ANION GAP 2 (L) 4 - 13 mmol/L    BUN 9 5 - 25 mg/dL    Creatinine 0 82 0 60 - 1 30 mg/dL    Glucose 120 65 - 140 mg/dL    Calcium 9 3 8 3 - 10 1 mg/dL    AST 17 5 - 45 U/L    ALT 28 12 - 78 U/L    Alkaline Phosphatase 78 46 - 116 U/L    Total Protein 6 9 6 4 - 8 4 g/dL    Albumin 3 7 3 5 - 5 0 g/dL    Total Bilirubin 0 34 0 20 - 1 00 mg/dL    eGFR 94 ml/min/1 73sq m   Lipase    Collection Time: 08/02/22  3:20 PM   Result Value Ref Range    Lipase 245 73 - 393 u/L   UA w Reflex to Microscopic w Reflex to Culture    Collection Time: 08/02/22  3:27 PM    Specimen: Urine, Clean Catch   Result Value Ref Range    Color, UA Light Yellow     Clarity, UA Clear     Specific Bradley, UA 1 018 1 003 - 1 030    pH, UA 6 5 4 5, 5 0, 5 5, 6 0, 6 5, 7 0, 7 5, 8 0    Leukocytes, UA Negative Negative    Nitrite, UA Negative Negative    Protein, UA Negative Negative mg/dl    Glucose, UA Negative Negative mg/dl    Ketones, UA Negative Negative mg/dl    Urobilinogen, UA <2 0 <2 0 mg/dl mg/dl    Bilirubin, UA Negative Negative    Occult Blood, UA Negative Negative   POCT pregnancy, urine    Collection Time: 08/02/22  3:32 PM   Result Value Ref Range    EXT PREG TEST UR (Ref: Negative) Negative     Control Valid    Protime-INR    Collection Time: 08/02/22  6:59 PM   Result Value Ref Range    Protime 13 1 11 6 - 14 5 seconds    INR 0 97 0 84 - 1 19   APTT    Collection Time: 08/02/22  6:59 PM   Result Value Ref Range    PTT 26 23 - 37 seconds   Fibrinogen    Collection Time: 08/02/22  6:59 PM   Result Value Ref Range    Fibrinogen 376 227 - 495 mg/dL   Blood culture    Collection Time: 08/02/22  7:07 PM    Specimen: Hand, Left; Blood   Result Value Ref Range    Blood Culture Received in Microbiology Lab  Culture in Progress  Blood culture    Collection Time: 08/02/22  7:07 PM    Specimen: Arm, Left; Blood   Result Value Ref Range    Blood Culture Received in Microbiology Lab  Culture in Progress  FLU/RSV/COVID - if FLU/RSV clinically relevant    Collection Time: 08/02/22  7:46 PM    Specimen: Nose; Nares   Result Value Ref Range    SARS-CoV-2 Negative Negative    INFLUENZA A PCR Negative Negative    INFLUENZA B PCR Negative Negative    RSV PCR Negative Negative         XR chest pa & lateral    Result Date: 8/3/2022  Narrative: CHEST INDICATION:   Right flank pain  COMPARISON:  4/19/2019 EXAM PERFORMED/VIEWS:  XR CHEST PA & LATERAL FINDINGS: Cardiomediastinal silhouette appears unremarkable  The lungs are clear  No pneumothorax or pleural effusion  Osseous structures appear within normal limits for patient age  Impression: No acute cardiopulmonary disease   Workstation performed: [id number]     CT renal stone study abdomen pelvis wo contrast    Result Date: 8/2/2022  Narrative: CT ABDOMEN AND PELVIS WITHOUT IV CONTRAST - LOW DOSE RENAL STONE INDICATION:   left abd and right flank pain  COMPARISON:  CT abdomen/pelvis 9/13/2005  MR pelvis 12/19/2020 20  TECHNIQUE:  Low radiation dose thin section CT examination of the abdomen and pelvis was performed without intravenous or oral contrast according to a protocol specifically designed to evaluate for urinary tract calculus  Axial, sagittal, and coronal 2D  reformatted images were created from the source data and submitted for interpretation  Evaluation for pathology in the abdomen and pelvis that is unrelated to urinary tract calculi is limited  Radiation dose length product (DLP) for this visit:  445 mGy-cm   This examination, like all CT scans performed in the [facility], was performed utilizing techniques to minimize radiation dose exposure, including the use of iterative reconstruction and automated exposure control  URINARY TRACT FINDINGS: RIGHT KIDNEY AND URETER:  Normal size and position  No gross mass within the limitations of a noncontrast exam  No calculi  No hydronephrosis  LEFT KIDNEY AND URETER:  Ipsilateral renal ectopia with iliac positioning  Normal size  No gross mass within the limitations of a noncontrast exam  Multiple small vascular calcifications versus punctate nonobstructive calculi  No hydronephrosis  URINARY BLADDER:  Within normal limits  No calculi  ADDITIONAL FINDINGS: LOWER CHEST: No clinically significant abnormality is identified in the visualized lower chest  No consolidation or effusion  SOLID VISCERA:  Single punctate calcified granuloma within the spleen  Otherwise, limited low radiation dose noncontrast CT evaluation demonstrates no clinically significant abnormality of the imaged portions of the liver, spleen, pancreas, or adrenal glands  BILIARY: No intrahepatic biliary ductal dilatation  Normal caliber common bile duct  GALLBLADDER: Decompressed  No calcified stones  Normal wall thickness   STOMACH AND BOWEL: Stomach is grossly within normal limits  Normal caliber small bowel  Normal caliber large bowel  No evidence of active small or large bowel inflammatory process  APPENDIX: Appendectomy  ABDOMINOPELVIC CAVITY: No ascites  No intraperitoneal free air  No lymphadenopathy  No retroperitoneal hematoma  VESSELS: Normal caliber abdominal aorta with no detectable atherosclerotic plaque  The right ovarian vein is dilated measuring 11 mm with perivascular inflammatory fat stranding, and demonstrates areas of linear hyperattenuation both in the pelvis (2/137) and in the abdomen extending to the junction with the IVC (601/75), findings compatible with acute thrombosis  The thrombus does not appear to extend into the IVC, but is incompletely evaluated on noncontrast exam  REPRODUCTIVE ORGANS:  Anteverted uterus  Again seen is malpositioned IUD with caudal stem oriented towards the left fundus, the arms directed towards the lower uterine segment; no perforation  Normal ovaries  ABDOMINAL WALL/INGUINAL REGIONS:  Tiny fat-containing umbilical hernia  BONES:  Vertebral body height is maintained  No acute fracture or destructive osseous lesion  No significant degenerative changes  Impression: 1  Findings of acute right ovarian vein thrombosis, with hyperdense material expanding the vein to the level of the IVC and associated perivascular inflammatory stranding  The thrombus does not appear to extend to the IVC but is incompletely evaluated on a noncontrast exam  Otherwise no acute findings in the abdomen or pelvis  2   No obstructive uropathy  There are punctate nonobstructive stones versus small vascular calcifications in the ectopic left iliac kidney  3   Stable malpositioning of intrauterine contraceptive device   I personally discussed this study with Sarahi Rolle on 8/2/2022 at 5:00 PM  Workstation performed: [id number]       Assessment and Plan:    # Right Ovarian vein Thrombosis:    70-year-old female with no major past medical history presenting for evaluation of acute onset of abdominal pain, incidentally found to have right ovarian thrombosis  Patient with no history of clotting disorders, no clinical signs or symptoms of  infections, and no recent GI/ procedures  Viral gastroenteritis is possible as the etiology causing patient's nausea and abdominal discomfort  Recommend obtaining a CT abdomen and pelvis with contrast (this has been ordered) to better evaluate for presence of ovarian vein thrombosis  If CT with contrast does confirm thrombosis, patient can likely be transitioned from Lovenox to a DOAC with DVT dosing (Eliquis 10mg BID x 7 days followed by Eliquis 5mg BID afterwards or Xarelto 15 mg a day times 21 days, followed by Xarelto 20 mg daily afterwards)  This would be considered a case of unprovoked DVT given that patient has not had any recent travel, recent surgeries, underlying malignancies, or use of estrogen containing medications/products (aware of the progestin containing IUD, however the dose of progestin in the IUD does not pose any elevated risk for VTE)  Patient should be on [facility] for at least 3 months, after which she should f/u with hematologist in the OP setting in 3 months to discuss about whether she should have hypercoagulable workup and to determine whether she should be on [facility] for greater than 3 months  # Acute onset of SOB/chest discomfort     Patient was reporting SOB and chest discomfort during my 2nd encounter  In the setting of recent finding of ovarian vein thrombosis, CTA chest is recommended to evaluate for PE causing patient's symptoms (this has been ordered)

## 2022-08-03 NOTE — UTILIZATION REVIEW
Initial Clinical Review    Admission: Date/Time/Statement:   Admission Orders (From admission, onward)     Ordered        08/02/22 1925  Inpatient Admission  Once                      Orders Placed This Encounter   Procedures    Inpatient Admission     Standing Status:   Standing     Number of Occurrences:   1     Order Specific Question:   Level of Care     Answer:   Med Surg [16]     Order Specific Question:   Estimated length of stay     Answer:   More than 2 Midnights     Order Specific Question:   Certification     Answer:   I certify that inpatient services are medically necessary for this patient for a duration of greater than two midnights  See H&P and MD Progress Notes for additional information about the patient's course of treatment  ED Arrival Information     Expected   -    Arrival   8/2/2022 14:01    Acuity   Urgent            Means of arrival   Walk-In    Escorted by   Self    Service   OB/GYN    Admission type   Urgent            Arrival complaint   Flank pain           Chief Complaint   Patient presents with    Abdominal Pain     With right flank pain and wraps around to lower abd and pain in LLQ of abd, just started about 1 hour ago  Pt shaking with pain  Denies nausea or vomiting        Initial Presentation: 28 y o  female presents to [place] ED via personal vehicle with c/o acute onset abdominal pain radiating to back and flank pain with associated nausea and vomiting, fever and chills noted at home  CTAP is suggestive of ovarian vein thrombosis  Admit Inpatient med surg, GYN admit, blood and urine cxs, order chest XR, PT/PTT and fibrinogen ordered, hematology consult, antiemetics and pain control  Date: 8/3   Day 2:   Patient reports continued to abdominal pain overnight with only mild relief with pain medications  Diffuse abdominal tenderness, mostly in lower quadrants  Pt has not eaten since admission, continue regular diet    Continue pain and nausea control, f/u on labs and cxs       8/3 Hematology Consult: This is unprovoked venous thromboembolism in such as rare location  Agree with current treatment with Lovenox  Pt started to have some chest pain and shortness of breath  Stat CTA of the chest to be done to rule out pulmonary embolism  Since ovarian vein thrombosis was suggested based on noncontrast CT scan, confirm with CT scan with IV contrast of pelvis  Regarding the long-term anticoagulation, DOAC is appropriate  Length of anticoagulation is yet to be determined  Recommended her to see hematologist in 3 months, I which time further workup, especially hypercoagulable workup to be considered then determine length of anticoagulation  ED Triage Vitals [08/02/22 1404]   Temperature Pulse Respirations Blood Pressure SpO2   98 °F (36 7 °C) 105 20 119/79 100 %      Temp Source Heart Rate Source Patient Position - Orthostatic VS BP Location FiO2 (%)   Temporal Monitor Sitting Left arm --      Pain Score       10 - Worst Possible Pain          Wt Readings from Last 1 Encounters:   08/02/22 74 9 kg (165 lb 3 2 oz)     Additional Vital Signs:   Date/Time Temp Pulse Resp BP MAP (mmHg) SpO2 O2 Device   08/03/22 0545 -- 66 -- 102/51 68 96 % --   08/02/22 23:19:05 97 2 °F (36 2 °C) Abnormal  69 -- 103/46 Abnormal  65 98 % --   08/02/22 23:17:09 97 2 °F (36 2 °C) Abnormal  71 16 103/46 Abnormal  65 96 % --   08/02/22 2156 -- -- -- 106/43 Abnormal  64 Abnormal  -- --   08/02/22 2130 -- 80 -- -- -- 98 % None (Room air)   08/02/22 1915 -- -- -- 102/55 77 93 % --   08/02/22 1745 -- 76 18 104/62 -- 97 % --   08/02/22 1619 -- 73 18 101/47 Abnormal  -- 98 % --   08/02/22 1515 -- -- -- -- -- -- None (Room air)   08/02/22 1404 98 °F (36 7 °C) 105 20 119/79 -- 100 % None (Room air)     Pertinent Labs/Diagnostic Test Results:   XR chest pa & lateral   Final Result by Barbara Dominguez MD (08/03 3250)      No acute cardiopulmonary disease     CT renal stone study abdomen pelvis wo contrast   Final Result by Lisette Wisdom MD (08/02 1713)      1  Findings of acute right ovarian vein thrombosis, with hyperdense material expanding the vein to the level of the IVC and associated perivascular inflammatory stranding  The thrombus does not appear to extend to the IVC but is incompletely evaluated    on a noncontrast exam  Otherwise no acute findings in the abdomen or pelvis  2   No obstructive uropathy  There are punctate nonobstructive stones versus small vascular calcifications in the ectopic left iliac kidney  3   Stable malpositioning of intrauterine contraceptive device     CT abdomen pelvis w contrast    (Results Pending)     Results from last 7 days   Lab Units 08/02/22  1946   SARS-COV-2  Negative     Results from last 7 days   Lab Units 08/02/22  1520   WBC Thousand/uL 9 42   HEMOGLOBIN g/dL 13 8   HEMATOCRIT % 42 3   PLATELETS Thousands/uL 190   NEUTROS ABS Thousands/µL 7 15     Results from last 7 days   Lab Units 08/02/22  1520   SODIUM mmol/L 139   POTASSIUM mmol/L 3 5   CHLORIDE mmol/L 111*   CO2 mmol/L 26   ANION GAP mmol/L 2*   BUN mg/dL 9   CREATININE mg/dL 0 82   EGFR ml/min/1 73sq m 94   CALCIUM mg/dL 9 3     Results from last 7 days   Lab Units 08/02/22  1520   AST U/L 17   ALT U/L 28   ALK PHOS U/L 78   TOTAL PROTEIN g/dL 6 9   ALBUMIN g/dL 3 7   TOTAL BILIRUBIN mg/dL 0 34     Results from last 7 days   Lab Units 08/02/22  1520   GLUCOSE RANDOM mg/dL 120       Results from last 7 days   Lab Units 08/02/22  1859   PROTIME seconds 13 1   INR  0 97   PTT seconds 26     Results from last 7 days   Lab Units 08/02/22  1520   LIPASE u/L 245     Results from last 7 days   Lab Units 08/02/22  1527   CLARITY UA  Clear   COLOR UA  Light Yellow   SPEC GRAV UA  1 018   PH UA  6 5   GLUCOSE UA mg/dl Negative   KETONES UA mg/dl Negative   BLOOD UA  Negative   PROTEIN UA mg/dl Negative   NITRITE UA  Negative   BILIRUBIN UA  Negative   UROBILINOGEN UA (BE) mg/dl <2 0   LEUKOCYTES UA  Negative Results from last 7 days   Lab Units 08/02/22  1946   INFLUENZA A PCR  Negative   INFLUENZA B PCR  Negative   RSV PCR  Negative     Results from last 7 days   Lab Units 08/02/22  1907   BLOOD CULTURE  Received in Microbiology Lab  Culture in Progress  Received in Microbiology Lab  Culture in Progress  ED Treatment:   Medication Administration from 08/02/2022 1401 to 08/02/2022 2155       Date/Time Order Dose Route Action     08/02/2022 1521 ondansetron (ZOFRAN) injection 4 mg 4 mg Intravenous Given     08/02/2022 1533 ketorolac (TORADOL) injection 15 mg 15 mg Intravenous Given     08/02/2022 1622 HYDROmorphone (DILAUDID) injection 0 4 mg 0 4 mg Intravenous Given     08/02/2022 1750 HYDROmorphone (DILAUDID) injection 0 6 mg 0 6 mg Intravenous Given     08/02/2022 2021 acetaminophen (TYLENOL) tablet 650 mg 650 mg Oral Given     08/02/2022 2021 oxyCODONE (ROXICODONE) immediate release tablet 10 mg 10 mg Oral Given        Past Medical History:   Diagnosis Date    Depression     History of positive PPD     Known health problems: none     Pelvic kidney     Pelvic kidney     left side     Present on Admission:   IUD threads lost   Thrombosis of ovarian vein   Abdominal pain      Admitting Diagnosis: Flank pain [R10 9]  Thrombosis of ovarian vein [I82 890]  Age/Sex: 28 y o  female  Admission Orders:  Scheduled Medications:  acetaminophen, 650 mg, Oral, Q6H [facility] & longterm  enoxaparin, 1 mg/kg, Subcutaneous, Q12H [facility]      Continuous IV Infusions: none     PRN Meds:  HYDROmorphone, 0 5 mg, Intravenous, Q3H PRN x1 thus far  HYDROmorphone, 0 5 mg, Intravenous, Q4H PRN x1 then dcd'd  ondansetron, 4 mg, Intravenous, Q4H PRN  oxyCODONE, 10 mg, Oral, Q4H PRN x3 thus far  oxyCODONE, 5 mg, Oral, Q4H PRN        IP CONSULT TO HEMATOLOGY    Network Utilization Review Department  ATTENTION: Please call with any questions or concerns to [phone] and carefully listen to the prompts so that you are directed to the right person  All voicemails are confidential   Clara Shirley all requests for admission clinical reviews, approved or denied determinations and any other requests to dedicated fax number below belonging to the campus where the patient is receiving treatment   List of dedicated fax numbers for the Facilities:  [address] DENIALS (Administrative/Medical Necessity) [phone]   [address] (Maternity/NICU/Pediatrics) [phone]   [address]  [address] [address] [place] [address] [address] [place] [address] [address] [facility] [place] [address] [phone]

## 2022-08-03 NOTE — UTILIZATION REVIEW
Inpatient Admission Authorization Request   NOTIFICATION OF INPATIENT ADMISSION/INPATIENT AUTHORIZATION REQUEST   SERVICING FACILITY:   [facility]  Address: [address] [place] [address], [address] [place] [address]  Tax ID: [id number]  NPI: [id number]  Place of Service: Inpatient [address] [facility] Code: 24     ATTENDING PROVIDER:  Attending Name and NPI#: Judson Sosa Md [[id number]]  Address: [address] [place] [address], [address] [place] [address]  Phone: [phone]     UTILIZATION REVIEW CONTACT:  Yao Umanzor Utilization   Network Utilization Review Department  Phone: [phone]  Fax: [phone]  Email: [email or web address]     PHYSICIAN ADVISORY SERVICES:  FOR QJYY-PH-QPFN REVIEW - MEDICAL NECESSITY DENIAL  Phone: [phone]  Fax: [phone]  Email: [email or web address]     TYPE OF REQUEST:  Inpatient Status     ADMISSION INFORMATION:  ADMISSION DATE/TIME: 8/2/22  7:25 PM  PATIENT DIAGNOSIS CODE/DESCRIPTION:  Flank pain [R10 9]  Thrombosis of ovarian vein [I82 890]  DISCHARGE DATE/TIME: No discharge date for patient encounter  IMPORTANT INFORMATION:  Please contact Yao Umanzor directly with any questions or concerns regarding this request  Department voicemails are confidential     Send requests for admission clinical reviews, concurrent reviews, approvals, and administrative denials due to lack of clinical to fax [phone]

## 2022-08-03 NOTE — ASSESSMENT & PLAN NOTE
· Patient has never had ramifications or seen anyone for this  It was first noted during laparoscopy as per patient     · Creatinine and GFR WNL

## 2022-08-03 NOTE — PROGRESS NOTES
Gyn Progress Note   Yancy Rust 28 y o  female MRN: [id number]  Unit/Bed#: CW2 218-01 Encounter: [id number]      A/P:31 yo presenting with abdominal pain, found to have ovarian vein thrombosis  Abdominal pain  Assessment & Plan  · Schedule Tylenol with Roxicodone 5/10 for moderate and severe pain  · Zofran PRN    Pelvic kidney  Assessment & Plan  · Patient has never had ramifications or seen anyone for this  It was first noted during laparoscopy as per patient  · Creatinine and GFR WNL     IUD threads lost  Assessment & Plan  · [place] Brood IUD placed in 2019 for AUB  · IUD is malpositioned in the uterus but appears to be functional for contraception  * Thrombosis of ovarian vein  Assessment & Plan  · Patient is afebrile with normal vitals and a normal WBC  Infection therefore does not seem likely  However, will evaluate blood culture, urine culture and CXR to evaluate for other sources of infection  · PT, PTT and Fibrinogen wnl  · Hematology consult  · Therapeutic Lovenox 1mg/kg bid will be started to treat thrombosis  · Regular diet        Dispo: Remain inpatient    Patient reports continued to abdominal pain overnight with only mild relief with pain medications  No new events over night  She has not attempted PO yet since being here   She denies fevers, chills, N/V       /51   Pulse 66   Temp (!) 97 2 °F (36 2 °C)   Resp 16   Ht 5' 5" (1 651 m)   Wt 74 9 kg (165 lb 3 2 oz)   LMP 07/20/2022 (Exact Date)   SpO2 96%   BMI 27 49 kg/m²     Lab Results   Component Value Date    WBC 9 42 08/02/2022    HGB 13 8 08/02/2022    HCT 42 3 08/02/2022    MCV 96 08/02/2022     08/02/2022       Lab Results   Component Value Date    GLUCOSE 88 09/22/2016    CALCIUM 9 3 08/02/2022     (L) 04/10/2015    K 3 5 08/02/2022    CO2 26 08/02/2022     (H) 08/02/2022    BUN 9 08/02/2022    CREATININE 0 82 08/02/2022       Lab Results   Component Value Date/Time    POCGLU 91 03/10/2014 07:43 PM No intake/output data recorded  No intake/output data recorded  Physical Exam  Physical Exam  Vitals reviewed  Cardiovascular:      Rate and Rhythm: Normal rate and regular rhythm  Pulmonary:      Effort: Pulmonary effort is normal  No respiratory distress  Abdominal:      Palpations: There is no mass  Comments: Diffuse abdominal tenderness, mostly in lower quadrants   Musculoskeletal:         General: No swelling or tenderness  Skin:     General: Skin is warm and dry  Neurological:      Mental Status: She is alert and oriented to person, place, and time             Bennett Robertson MD   OB/Gyn PGY-4  8/3/2022 7:26 AM

## 2022-08-03 NOTE — ASSESSMENT & PLAN NOTE
· Patient is afebrile with normal vitals and a normal WBC  Infection therefore does not seem likely  However, will evaluate blood culture, urine culture and CXR to evaluate for other sources of infection    · PT, PTT and Fibrinogen wnl  · CT scan w/ IV contrast 8/3 PM showed no ovarian vein thrombosis, after discussion with hematology, likely that lovenox resolved clot   · S/p two doses of therapeutic Lovenox, transitioned to xarelto 15 mg BID 8/3 PM per hematology   · Regular diet

## 2022-08-03 NOTE — TELEPHONE ENCOUNTER
----- Message from Pamela Albert, [address] sent at 8/3/2022  8:06 AM EDT -----  Please let her know that her pap and sti screening are negative  Thank you!

## 2022-08-03 NOTE — QUICK NOTE
At bedside with Dr Deepthi Pinon (Chief resident) to discuss findings of CT scan with IV contrast  Discussed with patient that CT scan was negative for PE or ovarian vein thrombosis at this time  Discussed that per hematology, it is likely that Lovenox dosing resolved the thromobsis  Patient still with mild tenderness to palpation in the suprapubic and lower quadrants without any rebound or guarding  Discussed that pain control is the current barrier to discharge and plan to discontinue dilaudid at this time in an attempt to transition to solely oral analgesics  Will plan to continue tylenol, gabapentin and roxicodone for pain control  After discussion with hematology, will plan to begin xarelto 15 mg BID for 21 days followed by 20 mg daily starting on day 22 for the remainder of her three months until follow up with hematology  Case management consult placed to aid with obtaining scripts  RX sent to [facility]           Vitals:    08/03/22 1548   BP: 100/61   Pulse:    Resp:    Temp: 98 °F (36 7 °C)   SpO2:          Shirin Cordova MD  Obstetrics & Gynecology PGY-2  8/3/2022  4:25 PM

## 2022-08-04 ENCOUNTER — PATIENT OUTREACH (OUTPATIENT)
Dept: OBGYN CLINIC | Facility: CLINIC | Age: 32
End: 2022-08-04

## 2022-08-04 VITALS
WEIGHT: 165.2 LBS | SYSTOLIC BLOOD PRESSURE: 95 MMHG | OXYGEN SATURATION: 92 % | TEMPERATURE: 98.1 F | HEIGHT: 65 IN | RESPIRATION RATE: 18 BRPM | HEART RATE: 91 BPM | BODY MASS INDEX: 27.52 KG/M2 | DIASTOLIC BLOOD PRESSURE: 61 MMHG

## 2022-08-04 DIAGNOSIS — G44.209 TENSION HEADACHE: ICD-10-CM

## 2022-08-04 DIAGNOSIS — F41.1 GAD (GENERALIZED ANXIETY DISORDER): ICD-10-CM

## 2022-08-04 LAB — BACTERIA UR CULT: NORMAL

## 2022-08-04 PROCEDURE — NC001 PR NO CHARGE: Performed by: OBSTETRICS & GYNECOLOGY

## 2022-08-04 PROCEDURE — 99238 HOSP IP/OBS DSCHRG MGMT 30/<: CPT | Performed by: OBSTETRICS & GYNECOLOGY

## 2022-08-04 RX ORDER — ALBUTEROL SULFATE 90 UG/1
2 AEROSOL, METERED RESPIRATORY (INHALATION) EVERY 6 HOURS PRN
Qty: 18 G | Refills: 1 | Status: SHIPPED | OUTPATIENT
Start: 2022-08-04 | End: 2022-09-03

## 2022-08-04 RX ORDER — PROPRANOLOL HYDROCHLORIDE 10 MG/1
TABLET ORAL
Qty: 90 TABLET | Refills: 0 | Status: SHIPPED | OUTPATIENT
Start: 2022-08-04 | End: 2022-08-04

## 2022-08-04 RX ORDER — OXYCODONE HYDROCHLORIDE 5 MG/1
5 TABLET ORAL EVERY 4 HOURS PRN
Qty: 10 TABLET | Refills: 0 | Status: SHIPPED | OUTPATIENT
Start: 2022-08-04 | End: 2022-08-14

## 2022-08-04 RX ADMIN — GABAPENTIN 300 MG: 300 CAPSULE ORAL at 09:52

## 2022-08-04 RX ADMIN — OXYCODONE HYDROCHLORIDE 5 MG: 5 TABLET ORAL at 09:54

## 2022-08-04 RX ADMIN — ACETAMINOPHEN 650 MG: 325 TABLET ORAL at 12:28

## 2022-08-04 RX ADMIN — RIVAROXABAN 15 MG: 15 TABLET, FILM COATED ORAL at 09:54

## 2022-08-04 RX ADMIN — OXYCODONE HYDROCHLORIDE 10 MG: 10 TABLET ORAL at 03:48

## 2022-08-04 NOTE — QUICK NOTE
Quick note:  - Confirmed with Dr Eve Salgado that CTA CAP with "Residual thrombus is present in the right ovarian vein adjacent to the IVC  "  - recommendations remain same as 8/3 note for hematology       VILLA Albrecht-BC  Hematology/Oncology Nurse Practitioner

## 2022-08-04 NOTE — CASE MANAGEMENT
Case Management Discharge Planning Note    Patient name Morgan Bauman  Location 2 218/2 218-01 MRN [id number]  : 1990 Date 2022       Current Admission Date: 2022  Current Admission Diagnosis: Thrombosis of ovarian vein   Patient Active Problem List    Diagnosis Date Noted    Thrombosis of ovarian vein 2022    Abdominal pain 2022    Tension headache 04/15/2022    Overweight (BMI 25 0-29 9) 2022    RADHA (generalized anxiety disorder) 2022    Adjustment insomnia 2022    Abnormal uterine bleeding 2020    Pelvic kidney 2020    HGSIL (high grade squamous intraepithelial lesion) on Pap smear of cervix 2020    IUD threads lost 2020    Major depressive disorder 2020    Spontaneous vaginal delivery 2016      LOS (days): 2  Geometric Mean LOS (GMLOS) (days):   Days to GMLOS:     OBJECTIVE:  Risk of Unplanned Readmission Score: 8 52         Current admission status: Inpatient   Preferred Pharmacy:   [facility]/pharmacy #[id number] Luis Mock  [address] [place] [address] [address] [facility] [address]  Phone: [phone] Fax: [address] [address] [place] [address] [address] [facility]  Phone: [phone] Fax: [phone]    Primary Care Provider: Laverne Tejeda MD    Primary Insurance: Irwin[place] rosa [place] BRIANNA  Secondary Insurance:     DISCHARGE DETAILS:    Discharge planning discussed with[de-identified] Patient at bedside      Additional Comments: CM provided pt with a coupon card for Xarelto at bedside -- Pt appreciative  No further CM needs identified  CM will follow to d/c

## 2022-08-04 NOTE — PROGRESS NOTES
Progress Note - OB/GYN   Roy Apgar 28 y o  female MRN: [id number]  Unit/Bed#: CW2 218-01 Encounter: [id number]    Assessment:  28 y o  Who presented with abdominal pain, found to have ovarian vein thrombosis on arrival, now resolved on repeat CT scan, with continue pain  Plan:    Abdominal pain  Assessment & Plan  · Schedule Tylenol with Roxicodone 5/10 for moderate and severe pain  · Zofran PRN    Pelvic kidney  Assessment & Plan  · Patient has never had ramifications or seen anyone for this  It was first noted during laparoscopy as per patient  · Creatinine and GFR WNL     IUD threads lost  Assessment & Plan  · Can Paulina IUD placed in 2019 for AUB  · IUD is malpositioned in the uterus but appears to be functional for contraception  * Thrombosis of ovarian vein  Assessment & Plan  · Patient is afebrile with normal vitals and a normal WBC  Infection therefore does not seem likely  However, will evaluate blood culture, urine culture and CXR to evaluate for other sources of infection  · PT, PTT and Fibrinogen wnl  · CT scan w/ IV contrast 8/3 PM showed no ovarian vein thrombosis, after discussion with hematology, likely that lovenox resolved clot   · S/p two doses of therapeutic Lovenox, transitioned to xarelto 15 mg BID 8/3 PM per hematology   · Regular diet      Subjective/Objective     Subjective:      Patient reports that pain was initially improved yesterday however around 0300 this a m  She went to urinate and experienced a sudden onset of severe pain and has been in continued pain since then  Describes pain as stabbing in nature and constant baseline with waxing and waning severity  Reports that she continues to have nausea and was unable to eat dinner last night secondary to feeling unwell  Reports that the return of pain in her abdomen this morning associated with an onset of pain in her thighs    Patient states that she has had this pain in the past unrelated to her abdominal pain however notice that the began around the same time this morning  Pain is worse in the left thigh than right with a mild tenderness to palpation on the lateral thigh  Reports that thigh pain is sometimes associated with numbness in her toes, however reports her sensation is fine today  Pain: yes  Tolerating PO: no  Voiding: yes  Flatus: yes  BM: yes  Ambulating: yes      bjective:     Vitals: Blood pressure (!) 88/66, pulse 73, temperature 98 °F (36 7 °C), resp  rate 18, height 5' 5" (1 651 m), weight 74 9 kg (165 lb 3 2 oz), last menstrual period 07/20/2022, SpO2 99 %, not currently breastfeeding  Physical Exam:     Physical Exam  Constitutional:       Appearance: Normal appearance  Pulmonary:      Effort: Pulmonary effort is normal    Abdominal:      Palpations: Abdomen is soft  Tenderness: There is generalized abdominal tenderness  There is no guarding  Comments: Tenderness greatest in suprapubic area  Generalized back tenderness with greatest tenderness reported in Right CVA area    Musculoskeletal:      Right upper leg: No swelling or edema  Left upper leg: Tenderness present  No swelling or edema  Comments: Area of localized tenderness in left lateral thigh with no palpable mass and no skin changes    Skin:     General: Skin is warm and dry  Neurological:      General: No focal deficit present  Mental Status: She is alert  Psychiatric:         Mood and Affect: Mood normal            Lab, Imaging and other studies: I have personally reviewed pertinent reports        Lab Results   Component Value Date    WBC 9 42 08/02/2022    HGB 13 8 08/02/2022    HCT 42 3 08/02/2022    MCV 96 08/02/2022     08/02/2022               Eliseo Hoffman MD  08/04/22

## 2022-08-04 NOTE — PLAN OF CARE
Problem: PAIN - ADULT  Goal: Verbalizes/displays adequate comfort level or baseline comfort level  Description: Interventions:  - Encourage patient to monitor pain and request assistance  - Assess pain using appropriate pain scale  - Administer analgesics based on type and severity of pain and evaluate response  - Implement non-pharmacological measures as appropriate and evaluate response  - Consider cultural and social influences on pain and pain management  - Notify physician/advanced practitioner if interventions unsuccessful or patient reports new pain  Outcome: Adequate for Discharge     Problem: DISCHARGE PLANNING  Goal: Discharge to home or other facility with appropriate resources  Description: INTERVENTIONS:  - Identify barriers to discharge w/patient and caregiver  - Arrange for needed discharge resources and transportation as appropriate  - Identify discharge learning needs (meds, wound care, etc )  - Arrange for interpretive services to assist at discharge as needed  - Refer to Case Management Department for coordinating discharge planning if the patient needs post-hospital services based on physician/advanced practitioner order or complex needs related to functional status, cognitive ability, or social support system  Outcome: Adequate for Discharge

## 2022-08-04 NOTE — NURSING NOTE
Reviewed AVS with PT  Given opportunity to ask any questions  IV removed, covered with gauze, WDL  PT walked out of facility unaccompanied   Belongings sent with PT

## 2022-08-04 NOTE — PLAN OF CARE
Problem: PAIN - ADULT  Goal: Verbalizes/displays adequate comfort level or baseline comfort level  Description: Interventions:  - Encourage patient to monitor pain and request assistance  - Assess pain using appropriate pain scale  - Administer analgesics based on type and severity of pain and evaluate response  - Implement non-pharmacological measures as appropriate and evaluate response  - Consider cultural and social influences on pain and pain management  - Notify physician/advanced practitioner if interventions unsuccessful or patient reports new pain  8/4/2022 1613 by Marleta Ormond, RN  Outcome: Adequate for Discharge  8/4/2022 1351 by Marleta Ormond, RN  Outcome: Adequate for Discharge

## 2022-08-04 NOTE — QUICK NOTE
Patient seen and reevaluated  Reports a slight headache at this time for which she just took tylenol  States that abdominal pain is still present but improved  Patient with decreasing analgesic requirements  Discussed discharge home at this time with continued anticoagulation until follow up with hematology in October  Patient has follow up scheduled with [address] [place] [address] on 8/11/22  Patient expressed understanding       Naheed Almodovar MD  Obstetrics & Gynecology PGY-2  8/4/2022  1:29 PM

## 2022-08-04 NOTE — DISCHARGE SUMMARY
Discharge Summary - Gynecology  Olivier Modi 28 y o  female MRN: [id number]  Unit/Bed#: CW2 218-01 Encounter: [id number]    Admission Date: 2022   Discharge Date: 22    Attending Physician:   Saroj Doyle MD     Consulting Physician(s):   Marita Webster MD - Hematology     Admitting Diagnosis:   Abdominal Pain   Ovarian Vein Thrombosis     Discharge Diagnosis:   Same     Procedures Performed:   CT - Abd/Pelvic w/o contrast   CT - Chest Ct abd/pelvis w/ contrast     HPI:  Olivier Modi is a 28 y o   female who presented with acute onset abdominal pain  On arrival, CT scan without contrast was significant for a right ovarian vein thrombosis  She was admitted for pain control and therapeutic lovenox  She received two doses of therapeutic lovenox and on hospital day 2 underwent a CT scan with IV contrast which showed resolution of the thrombosis  She was transitioned to PO xarelto  Her pain was improving at the time of discharge and was controlled with PO analgesics  She was discharged home with plans for three months of PO anticoagulation and follow up with hematology       Lab Results:   Lab Results   Component Value Date    WBC 9 42 2022    HGB 13 8 2022    HCT 42 3 2022    MCV 96 2022     2022     Lab Results   Component Value Date    GLUCOSE 88 2016    CALCIUM 9 3 2022     (L) 04/10/2015    K 3 5 2022    CO2 26 2022     (H) 2022    BUN 9 2022    CREATININE 0 82 2022     Lab Results   Component Value Date/Time    POCGLU 91 03/10/2014 07:43 PM     Lab Results   Component Value Date    PTT 26 2022     Lab Results   Component Value Date    INR 0 97 2022    INR 0 97 2018    INR 1 14 01/10/2015    PROTIME 13 1 2022    PROTIME 12 9 2018    PROTIME 14 4 (H)        Complications:   None    Condition at Discharge:   good     Discharge Medications:   See after visit summary for reconciled discharge medications provided to patient and family  Discharge instructions: See after visit summary for complete information  Provisions for Follow-Up Care:   See after visit summary for information related to follow-up care and any pertinent home health orders  Disposition: See After Visit Summary for discharge disposition information    Planned Readmission: No    Code Status: Full Code   Hai Thompson MD  8/4/2022  1:03 PM

## 2022-08-05 ENCOUNTER — TELEPHONE (OUTPATIENT)
Dept: PSYCHIATRY | Facility: CLINIC | Age: 32
End: 2022-08-05

## 2022-08-05 ENCOUNTER — TRANSITIONAL CARE MANAGEMENT (OUTPATIENT)
Dept: FAMILY MEDICINE CLINIC | Facility: CLINIC | Age: 32
End: 2022-08-05

## 2022-08-05 NOTE — UTILIZATION REVIEW
Notification of Discharge   This is a Notification of Discharge from our facility [address]  Please be advised that this patient has been discharge from our facility  Below you will find the admission and discharge date and time including the patients disposition  UTILIZATION REVIEW CONTACT:  Gavino Lundberg  Utilization   Network Utilization Review Department  Phone: [phone] x carefully listen to the prompts  All voicemails are confidential   Email: [email or web address]     PHYSICIAN ADVISORY SERVICES:  FOR [id number]-[id number]-[id number] REVIEW - MEDICAL NECESSITY DENIAL  Phone: [phone]  Fax: [phone]  Email: [email or web address]     PRESENTATION DATE: 8/2/2022  2:17 PM  OBERVATION ADMISSION DATE: INPATIENT ADMISSION DATE: 8/2/22  7:25 PM   DISCHARGE DATE: 8/4/2022  4:16 PM  DISPOSITION: Home/Self Care Home/Self Care      IMPORTANT INFORMATION:  Send all requests for admission clinical reviews, approved or denied determinations and any other requests to dedicated fax number below belonging to the campus where the patient is receiving treatment   List of dedicated fax numbers:  [address] DENIALS (Administrative/Medical Necessity) [phone]   [address] (Maternity/NICU/Pediatrics) [phone]   Liz Blakely [phone]   130 [facility] [phone]   [address] [place] [address] [phone]   2000 [facility] [address] [place] [address],[address] [phone]   [facility]  [phone]   [address] [place] [address], [facility]  [address] [phone]

## 2022-08-05 NOTE — PROGRESS NOTES
OB/GYN VISIT  Chris Jennings  2022  6:31 PM    Subjective:     Chris Jennings is a 28 y o  [id number] female who presents for hysterectomy discussion  She has a Liletta IUD placed in 2019 for AUB in [place] at [facility] [employer]  The patient has a history of abnormal cervical cytology and HPV 16  She had a cold knife cone in , which resulted in MESFIN 3 with negative margins  Most recent pap was NSIL with negative HPV  She has had no other gynecologic surgery, although an LAVH had been planned in  for AUB, but was never scheduled  She declines all other medical and surgical management  She presents today to discuss proceeding with the hysterectomy  She was recently seen at [facility] for an Ovarian Vein Thrombosis  CT scan without contrast was significant for a right ovarian vein thrombosis  She was admitted for pain control and therapeutic lovenox  She received two doses of therapeutic lovenox and on hospital day 2 underwent a CT scan with IV contrast which showed resolution of the thrombosis  She was transitioned to PO xarelto  Her pain was improving at the time of discharge and was controlled with PO analgesics  She was discharged home with plans for three months of PO anticoagulation and follow up with hematology, she has an appointment in the fall (earliest she could get)  She also complains of left leg pain behind her thigh that wakes her up from sleep  Please see physical exam  She was sent to [facility] AND [facility] ED to r/o DVT, ADT21 completed  History of abnormal pap smears  2014: ASCUS, cannot exclude HSIL  2016: ASCUS  2020: HSIL, HPV 16 positive --> colposcopy showed MESFIN 2-3 at 11-12:00, 1:00 and ECC  2020: Cold knife cone --> MESFIN 3 with negative margins  2022: NSIL w/neg HPV    Desire for permanent sterilization    Will sign MA30 today    Pelvic Kidney  Patient also has a medical history significant for pelvic kidney   An MRI was obtained on 20 which showed a known left pelvic kidney seen centrally and toward left of midline near level of upper uterus  Will discuss with urology or GYN ONC place pre-operative stents  The MRI also indicated possibility of adenomyosis  Past Medical History:   Diagnosis Date    Depression     History of positive PPD     Known health problems: none     Pelvic kidney     Pelvic kidney     left side     Past Surgical History:   Procedure Laterality Date    APPENDECTOMY      [place] COLPOSCOPY,CERVIX W/ADJ VAG,W/LOOP BX N/A 7/31/2020    Procedure: BIOPSY CONE/COLD KNIFE CERVIX, ECC;  Surgeon: Pedro Luis Ortiz MD;  [facility];  Service: Gynecology       Objective:    Vitals: Blood pressure 115/57, pulse (!) 107, resp  rate 18, height 5' 6" (1 676 m), weight 75 3 kg (166 lb), last menstrual period 07/20/2022, not currently breastfeeding  Body mass index is 26 79 kg/m²  Physical Exam  Constitutional:       Appearance: She is normal weight  HENT:      Head: Normocephalic and atraumatic  Mouth/Throat:      Mouth: Mucous membranes are dry  Cardiovascular:      Rate and Rhythm: Tachycardia present  Pulmonary:      Effort: No respiratory distress  Abdominal:      General: Abdomen is flat  Palpations: Abdomen is soft  Genitourinary:     Comments: Normal vulva, vagina  Cervix w/ post surgical changes but well healed  Small cystocele  Cervix with good descensus on valsalva  Mild tenderness bimanual exam (recent g/c negative)  Musculoskeletal:         General: Tenderness (positive homans sign left leg, very tender posterior thigh which wakes her up from sleep) present  No swelling (calves b/l measure 36 cm)  Cervical back: Normal range of motion  Comments: Warm and well perfused bilaterally  Very tender left leg   Neurological:      Mental Status: She is alert and oriented to person, place, and time     Psychiatric:         Behavior: Behavior normal        Assessment/Plan:  Problem List Items Addressed This Visit Genitourinary    Abnormal uterine bleeding     - Hysterectomy counseling 22  - Needs anticoagulation plan (discuss with hematology)  - Needs plan for ureteral stents (discuss w/ Gyn Onc)           Other Visit Diagnoses     Homans sign present    -  Primary    Relevant Orders    Transfer to other facility (Completed)          Given her parity and  exam, patient is a good candidate for a laparoscopic assisted vaginal hysterectomy  I consented her for diagnostic laparoscopy, laparoscopic-assisted vaginal hysterectomy, bilateral salpingectomy, cystoscopy, ureteral stent placement, and all other indicated procedures  I discussed the risks of surgery including bleeding, infection, injury to surrounding organs including bowel, bladder, ureter, nerves, and vessels, complications from anesthesia, need for blood transfusion, need for laparotomy    The patient has previously attempted medical management, and declines future attempts at this  We discussed alternatives to hysterectomy such as medical management with nonhormonal and hormonal pills, hormonal IUD, or endometrial ablation  In general these conservative measures carry lower risk than invasive surgery  She understands that she will no longer be able to bear children following the completion of this procedure  Risks, benefits, and alternatives have been reviewed with the patient, including but not limited to infection, bleeding, injury to the uterus and surrounding organs, such as bowel, bladder, blood vessels, nerves and ureters, risks of anesthesia, blood clots and death          Possible Complications of Hysterectomy:  - Conversion to laparotomy (3 9%)  - UTI (1 2-2 6%)  - Vaginal cuff dehiscence: (0 64-1 1%)  - Bowel injury (0 34-0 45%)  - Urinary tract injury (0 7-1 1% or 2 9-4 7% if prior )  - Wound infection (1 4%)    Given her history of abnormal Pap smears and recent cold knife cone biopsy resulting in MESFIN 3, patient is aware that she will need further Pap smears of her vaginal cuff per ASCCP guidelines  She has been counseled regarding the possible need for a blood transfusion and she accepts blood products  The patient is aware that if the procedure cannot be safely completed laparoscopically, I will proceed with an open procedure  The patients fallopian tubes will be removed at this time  The patients ovaries will be maintained at this time unless there is pathology noted intraoperatively  The patients medical insurance is federally funded/medical assistance, therefore, the MA-30 form has been signed and on the chart        D/w Dr Babita Franklin,   8/11/2022  6:31 PM

## 2022-08-07 LAB
BACTERIA BLD CULT: NORMAL
BACTERIA BLD CULT: NORMAL

## 2022-08-10 ENCOUNTER — PATIENT OUTREACH (OUTPATIENT)
Dept: OBGYN CLINIC | Facility: CLINIC | Age: 32
End: 2022-08-10

## 2022-08-11 ENCOUNTER — HOSPITAL ENCOUNTER (EMERGENCY)
Facility: HOSPITAL | Age: 32
Discharge: HOME | End: 2022-08-11
Attending: EMERGENCY MEDICINE
Payer: MEDICARE

## 2022-08-11 ENCOUNTER — OFFICE VISIT (OUTPATIENT)
Dept: OBGYN CLINIC | Facility: CLINIC | Age: 32
End: 2022-08-11

## 2022-08-11 VITALS
HEIGHT: 66 IN | DIASTOLIC BLOOD PRESSURE: 66 MMHG | RESPIRATION RATE: 18 BRPM | BODY MASS INDEX: 26.68 KG/M2 | SYSTOLIC BLOOD PRESSURE: 95 MMHG | WEIGHT: 166 LBS | TEMPERATURE: 97.5 F | OXYGEN SATURATION: 98 % | HEART RATE: 86 BPM

## 2022-08-11 VITALS
SYSTOLIC BLOOD PRESSURE: 115 MMHG | WEIGHT: 166 LBS | DIASTOLIC BLOOD PRESSURE: 57 MMHG | HEIGHT: 66 IN | RESPIRATION RATE: 18 BRPM | BODY MASS INDEX: 26.68 KG/M2 | HEART RATE: 107 BPM

## 2022-08-11 DIAGNOSIS — R09.89 HOMANS SIGN PRESENT: Primary | ICD-10-CM

## 2022-08-11 DIAGNOSIS — M79.662 PAIN OF LEFT CALF: Primary | ICD-10-CM

## 2022-08-11 DIAGNOSIS — N93.9 ABNORMAL UTERINE BLEEDING: ICD-10-CM

## 2022-08-11 PROBLEM — R87.613 HGSIL (HIGH GRADE SQUAMOUS INTRAEPITHELIAL LESION) ON PAP SMEAR OF CERVIX: Status: RESOLVED | Noted: 2020-06-25 | Resolved: 2022-08-11

## 2022-08-11 PROCEDURE — NC001 PR NO CHARGE: Performed by: EMERGENCY MEDICINE

## 2022-08-11 PROCEDURE — 99214 OFFICE O/P EST MOD 30 MIN: CPT | Performed by: OBSTETRICS & GYNECOLOGY

## 2022-08-11 PROCEDURE — 99283 EMERGENCY DEPT VISIT LOW MDM: CPT

## 2022-08-11 RX ORDER — PROPRANOLOL HYDROCHLORIDE 10 MG/1
TABLET ORAL
COMMUNITY
Start: 2022-08-04 | End: 2022-09-15 | Stop reason: ALTCHOICE

## 2022-08-11 NOTE — ED PROVIDER NOTES
History  Chief Complaint   Patient presents with    Leg Pain     Pt c/o left posterior leg pain since admission to the hospital  Denies injury  55-year-old female past medical history significant for ovarian vein thrombosis that has been treated with Lovenox inpatient and Xarelto as an outpatient  She was being seen by her OB Gyne today and discussed with them pain in her left lower extremity  The center to the ED for DVT rule out  Patient said that she has had left lower leg pain for about a month  Due to the history however of the ovarian vein thrombosis there has been concern for blood clot in the left lower extremity  She denies pain in the lower extremities before  She denies any history of blood clot prior to the ovarian vein thrombus  Denies any chest pain or shortness breath  No fevers or chills  Prior to Admission Medications   Prescriptions Last Dose Informant Patient Reported? Taking?    albuterol (PROVENTIL HFA,VENTOLIN HFA) 90 mcg/act inhaler  Self No No   Sig: Inhale 2 puffs every 6 (six) hours as needed for wheezing   hydrOXYzine HCL (ATARAX) 25 mg tablet  Self No No   Sig: TAKE 1 TABLET BY MOUTH EVERY 6 HOURS AS NEEDED FOR ANXIETY   magnesium Oxide (MAG-OX) 400 mg TABS  Self No No   Sig: TAKE 1 TABLET (400 MG TOTAL) BY MOUTH 2 TIMES A DAY   Patient not taking: No sig reported   ondansetron (ZOFRAN-ODT) 4 mg disintegrating tablet  Self Yes No   Sig: TAKE 1 TABLET BY MOUTH EVERY 8 HOURS AS NEEDED FOR NAUSEA AND VOMITING   oxyCODONE (Roxicodone) 5 immediate release tablet  Self No No   Sig: Take 1 tablet (5 mg total) by mouth every 4 (four) hours as needed for moderate pain for up to 10 days Max Daily Amount: 30 mg   propranolol (INDERAL) 10 mg tablet  Self Yes No   rivaroxaban (XARELTO) 15 mg tablet  Self No No   Sig: Take 1 tablet (15 mg total) by mouth 2 (two) times a day with meals for 21 days   rivaroxaban (Xarelto) 20 mg tablet  Self No No   Sig: Take 1 tablet (20 mg total) by mouth daily with breakfast   Patient not taking: Reported on 8/11/2022   sertraline (ZOLOFT) 100 mg tablet  Self No No   Sig: Take 2 tablets (200 mg total) by mouth daily at bedtime   zolpidem (AMBIEN) 5 mg tablet  Self No No   Sig: Take 1 tablet (5 mg total) by mouth daily at bedtime as needed for sleep      Facility-Administered Medications: None       Past Medical History:   Diagnosis Date    Depression     History of positive PPD     Known health problems: none     Pelvic kidney     Pelvic kidney     left side       Past Surgical History:   Procedure Laterality Date    APPENDECTOMY      [place] COLPOSCOPY,CERVIX W/ADJ VAG,W/LOOP BX N/A 7/31/2020    Procedure: BIOPSY CONE/COLD KNIFE CERVIX, ECC;  Surgeon: Margaret Bates MD;  [facility];  Service: Gynecology       Family History   Problem Relation Age of Onset    Crohn's disease Brother     Crohn's disease Maternal Aunt     Diabetes Maternal Grandmother     Diabetes Maternal Grandfather     Diabetes Paternal Grandmother     Diabetes Paternal Grandfather     COPD Mother     Diabetes Father     Hypertension Father     Depression Sister      I have reviewed and agree with the history as documented  E-Cigarette/Vaping    E-Cigarette Use Never User      E-Cigarette/Vaping Substances    Nicotine No     THC No     CBD No     Flavoring No     Other No     Unknown No      Social History     Tobacco Use    Smoking status: Current Every Day Smoker     Packs/day: 0 25     Types: Cigarettes    Smokeless tobacco: Never Used    Tobacco comment: 4 cig / day    Vaping Use    Vaping Use: Never used   Substance Use Topics    Alcohol use: Yes     Comment: Weekends only    Drug use: Never        Review of Systems   Constitutional: Negative for chills and fever  HENT: Negative for hearing loss  Eyes: Negative for visual disturbance  Respiratory: Negative for shortness of breath  Cardiovascular: Negative for chest pain  Gastrointestinal: Negative for abdominal pain, constipation, diarrhea, nausea and vomiting  Genitourinary: Negative for difficulty urinating  Musculoskeletal: Negative for myalgias  Skin: Negative for color change  Neurological: Negative for dizziness and headaches  Psychiatric/Behavioral: Negative for agitation  All other systems reviewed and are negative  Physical Exam  ED Triage Vitals   Temperature Pulse Respirations Blood Pressure SpO2   08/11/22 1617 08/11/22 1617 08/11/22 1617 08/11/22 1618 08/11/22 1617   97 5 °F (36 4 °C) 86 18 95/66 98 %      Temp Source Heart Rate Source Patient Position - Orthostatic VS BP Location FiO2 (%)   08/11/22 1617 08/11/22 1617 08/11/22 1617 08/11/22 1617 --   Temporal Monitor Sitting Left arm       Pain Score       --                    Orthostatic Vital Signs  Vitals:    08/11/22 1617 08/11/22 1618   BP:  95/66   Pulse: 86    Patient Position - Orthostatic VS: Sitting        Physical Exam  Vitals and nursing note reviewed  Constitutional:       General: She is not in acute distress  Appearance: Normal appearance  She is well-developed  She is not ill-appearing  HENT:      Head: Normocephalic and atraumatic  Right Ear: External ear normal       Left Ear: External ear normal       Nose: Nose normal  No congestion  Mouth/Throat:      Mouth: Mucous membranes are moist       Pharynx: Oropharynx is clear  No oropharyngeal exudate  Eyes:      General:         Right eye: No discharge  Left eye: No discharge  Extraocular Movements: Extraocular movements intact  Conjunctiva/sclera: Conjunctivae normal       Pupils: Pupils are equal, round, and reactive to light  Cardiovascular:      Rate and Rhythm: Normal rate and regular rhythm  Heart sounds: Normal heart sounds  No murmur heard  No friction rub  No gallop  Pulmonary:      Effort: Pulmonary effort is normal  No respiratory distress        Breath sounds: Normal breath sounds  No stridor  No wheezing  Abdominal:      General: Bowel sounds are normal  There is no distension  Palpations: Abdomen is soft  Tenderness: There is no abdominal tenderness  Musculoskeletal:         General: No swelling  Normal range of motion  Cervical back: Normal range of motion and neck supple  No rigidity  Comments: Tenderness to palpation over the left lower leg venous system  Skin:     General: Skin is warm and dry  Capillary Refill: Capillary refill takes less than 2 seconds  Neurological:      General: No focal deficit present  Mental Status: She is alert and oriented to person, place, and time  Mental status is at baseline  Motor: No weakness  Gait: Gait normal    Psychiatric:         Mood and Affect: Mood normal          Behavior: Behavior normal          ED Medications  Medications - No data to display    Diagnostic Studies  Results Reviewed     None                 VAS lower limb venous duplex study, unilateral/limited    (Results Pending)         Procedures  Procedures      ED Course                                       MDM  Number of Diagnoses or Management Options  Pain of left calf  Diagnosis management comments: Discussed with the patient that we will order a left lower extremity duplex to rule out DVT  Patient eloped from the department after my discussion with her that prior to DVT study        Disposition  Final diagnoses:   Pain of left calf     Time reflects when diagnosis was documented in both MDM as applicable and the Disposition within this note     Time User Action Codes Description Comment    8/11/2022  6:44 PM Gus Steve Add [[id number]] Pain of left calf       ED Disposition     ED Disposition   Left from Room after Provider Exam    Condition   --    Date/Time   Thu Aug 11, 2022  6:44 PM    Comment   Patient left prior to DVT study         Follow-up Information    None         Patient's Medications   Discharge Prescriptions No medications on file     No discharge procedures on file  PDMP Review       Value Time User    PDMP Reviewed  Yes 7/7/2022  6:03 PM Rocio Ellison MD           ED Provider  Attending physically available and evaluated Brandee Avila  ABRAHAM managed the patient along with the ED Attending      Electronically Signed by         Michele Rodriguez MD  08/11/22 6234

## 2022-08-11 NOTE — ASSESSMENT & PLAN NOTE
- Hysterectomy counseling 8/11/22  - Needs anticoagulation plan (discuss with hematology)  - Needs plan for ureteral stents (discuss w/ Gyn Onc)

## 2022-08-12 NOTE — ED ATTENDING ATTESTATION
8/11/2022  I, Neptali Finnegan MD, saw and evaluated the patient  I have discussed the patient with the resident/non-physician practitioner and agree with the resident's/non-physician practitioner's findings, Plan of Care, and MDM as documented in the resident's/non-physician practitioner's note, except where noted  All available labs and Radiology studies were reviewed  I was present for key portions of any procedure(s) performed by the resident/non-physician practitioner and I was immediately available to provide assistance  At this point I agree with the current assessment done in the Emergency Department  I have conducted an independent evaluation of this patient a history and physical is as follows:    ED Course     Patient left emergency department prior to my evaluation      Critical Care Time  Procedures

## 2022-08-17 ENCOUNTER — TELEPHONE (OUTPATIENT)
Dept: OBGYN CLINIC | Facility: CLINIC | Age: 32
End: 2022-08-17

## 2022-08-17 NOTE — TELEPHONE ENCOUNTER
Left VM for patient that [address] [facility] attending physician Dr Shadia Hewitt agreed to staff surgery and he would recommend robotic hysterectomy approach  His surgical scheduler will put her on the schedule  [facility] surgical scheduler) was notified  Her last EMB was 2020, so will need to check w/ surgical review committee that this is not too far in the past as she may need an updated one

## 2022-08-23 ENCOUNTER — TELEPHONE (OUTPATIENT)
Dept: PSYCHIATRY | Facility: CLINIC | Age: 32
End: 2022-08-23

## 2022-09-02 ENCOUNTER — TELEPHONE (OUTPATIENT)
Dept: HEMATOLOGY ONCOLOGY | Facility: CLINIC | Age: 32
End: 2022-09-02

## 2022-09-02 NOTE — TELEPHONE ENCOUNTER
Called and left a message for the patient and asked that she come in 40 min later on 10/7222 with Dr Adamaris Wyman

## 2022-09-15 ENCOUNTER — OFFICE VISIT (OUTPATIENT)
Dept: FAMILY MEDICINE CLINIC | Facility: CLINIC | Age: 32
End: 2022-09-15

## 2022-09-15 VITALS
TEMPERATURE: 98 F | OXYGEN SATURATION: 98 % | HEIGHT: 66 IN | RESPIRATION RATE: 19 BRPM | BODY MASS INDEX: 27.16 KG/M2 | DIASTOLIC BLOOD PRESSURE: 63 MMHG | WEIGHT: 169 LBS | HEART RATE: 81 BPM | SYSTOLIC BLOOD PRESSURE: 97 MMHG

## 2022-09-15 DIAGNOSIS — F41.1 GAD (GENERALIZED ANXIETY DISORDER): ICD-10-CM

## 2022-09-15 DIAGNOSIS — F51.02 ADJUSTMENT INSOMNIA: Primary | ICD-10-CM

## 2022-09-15 PROCEDURE — 99214 OFFICE O/P EST MOD 30 MIN: CPT | Performed by: FAMILY MEDICINE

## 2022-09-15 RX ORDER — HYDROXYZINE 50 MG/1
25 TABLET, FILM COATED ORAL EVERY 6 HOURS PRN
Qty: 90 TABLET | Refills: 1 | Status: SHIPPED | OUTPATIENT
Start: 2022-09-15 | End: 2022-10-15

## 2022-09-15 NOTE — ASSESSMENT & PLAN NOTE
Here for follow-up of adjustment insomnia  Still uncontrolled despite trying ramelteon, melatonin, Unisom, and trazodone  However, now having less nightmares  Patient was given short trial of Ambien which helps a little but will not refill at this time  Patient is yet to establish with psychiatry but encouraged to do so  Referral to Sleep Medicine ordered  Refilled Atarax to aid with her panic attacks

## 2022-09-15 NOTE — PROGRESS NOTES
OFFICE VISIT NOTE - [address] 28 y o  21 ) female MRN: [id number]  Unit/Bed#:  Encounter: [id number]        Name: Brayan Frye      : 1990      MRN: [id number]  Encounter Provider: Lincoln Bey MD  Encounter Date: 9/15/2022   Encounter department: [address] [place] [address]  Adjustment insomnia  Assessment & Plan:  Here for follow-up of adjustment insomnia  Still uncontrolled despite trying ramelteon, melatonin, Unisom, and trazodone  However, now having less nightmares  Patient was given short trial of Ambien which helps a little but will not refill at this time  Patient is yet to establish with psychiatry but encouraged to do so  Referral to Sleep Medicine ordered  Refilled Atarax to aid with her panic attacks  Orders:  -     Ambulatory Referral to Sleep Medicine; Future  -     Ambulatory Referral to [address] [place] [address]; Future    2  RADHA (generalized anxiety disorder)  Assessment & Plan: Tolerating Zoloft 100 mg qHS which is moderately effective  However, continues to deal with insomnia and panic attacks  Will continue Zoloft for now  Atarax refilled at higher dose of 50 mg Q6 prn for anxiety  Referral to [address] [place] [address] ordered  Will schedule with Dr Cheryl Agrawal for psychotherapy which patient will highly benefit from    Orders:  -     hydrOXYzine HCL (ATARAX) 50 mg tablet; Take 0 5 tablets (25 mg total) by mouth every 6 (six) hours as needed for anxiety  -     Ambulatory Referral to [address] [place] [address]; Future         Subjective      Brayan Frye is a 28 y o  female here for follow up of insomia which started after the death of her sister on 1/3/2022 due to suicide  Pt admits to recurrent nightmares about death and is awakened 2-3 times per night with panic attacks  This has only slightly improved since incident   This has been the pattern despite trials of ramelteon, Melatonin, Unisom and Trazodone  She was recently started on Ambien which helped her sleep better but patient reports only when she is sleeping in a "relaxed state"  Patient has a lot of identifiable stresses in her life  She is a mother to 4 children, is  from  2019, sister passed away by suicide back in January of this year and she is preparing to get into nursing school  RADHA-7 and PHQ-9 not done at this visit  However, at last visit, PHQ-9 was 20 in July and RADHA-7 was 15  She is currently maintained on zoloft 100mg qd  Of note, patient was recently admitted to the hospital due to ovarian vein thrombosis for which she was treated with anticoagulation and started on Xarelto outpatient  She denies chest pain, palpitations, SOB, fever, nausea or leg swelling  She has never tried psychotherapy in the past for her behavioral problems  The following portions of the patient's history were reviewed and updated as appropriate: allergies, current medications, past family history, past medical history, past social history, past surgical history and problem list       Review of Systems     Review of Systems   Constitutional: Negative for chills and fever  HENT: Negative for ear pain and sore throat  Eyes: Negative for pain and visual disturbance  Respiratory: Negative for cough and shortness of breath  Cardiovascular: Negative for chest pain and palpitations  Gastrointestinal: Negative for abdominal pain, nausea and vomiting  Genitourinary: Negative for dysuria and hematuria  Musculoskeletal: Negative for arthralgias and back pain  Skin: Negative for color change and rash  Neurological: Negative for dizziness, seizures, syncope and headaches  Psychiatric/Behavioral: Positive for sleep disturbance  Negative for dysphoric mood and suicidal ideas  The patient is nervous/anxious  All other systems reviewed and are negative        Current Outpatient Medications on File Prior to Visit   Medication Sig    ondansetron (ZOFRAN-ODT) 4 mg disintegrating tablet TAKE 1 TABLET BY MOUTH EVERY 8 HOURS AS NEEDED FOR NAUSEA AND VOMITING    rivaroxaban (Xarelto) 20 mg tablet Take 1 tablet (20 mg total) by mouth daily with breakfast    sertraline (ZOLOFT) 100 mg tablet Take 2 tablets (200 mg total) by mouth daily at bedtime    zolpidem (AMBIEN) 5 mg tablet Take 1 tablet (5 mg total) by mouth daily at bedtime as needed for sleep    magnesium Oxide (MAG-OX) 400 mg TABS TAKE 1 TABLET BY MOUTH 2 TIMES A DAY  (Patient not taking: Reported on 9/15/2022)    rivaroxaban (XARELTO) 15 mg tablet Take 1 tablet (15 mg total) by mouth 2 (two) times a day with meals for 21 days (Patient not taking: Reported on 9/15/2022)    [DISCONTINUED] hydrOXYzine HCL (ATARAX) 25 mg tablet TAKE 1 TABLET BY MOUTH EVERY 6 HOURS AS NEEDED FOR ANXIETY    [DISCONTINUED] propranolol (INDERAL) 10 mg tablet  (Patient not taking: Reported on 9/15/2022)       Objective     BP 97/63 (BP Location: Left arm, Patient Position: Sitting, Cuff Size: Standard)   Pulse 81   Temp 98 °F (36 7 °C) (Temporal)   Resp 19   Ht 5' 6" (1 676 m)   Wt 76 7 kg (169 lb)   SpO2 98%   BMI 27 28 kg/m²     Physical Exam  Constitutional:       General: She is not in acute distress  Appearance: She is not ill-appearing  HENT:      Head: Normocephalic and atraumatic  Right Ear: External ear normal       Left Ear: External ear normal       Nose: Nose normal    Eyes:      Conjunctiva/sclera: Conjunctivae normal    Cardiovascular:      Rate and Rhythm: Normal rate and regular rhythm  Heart sounds: Normal heart sounds  Pulmonary:      Effort: Pulmonary effort is normal  No respiratory distress  Breath sounds: Normal breath sounds  No wheezing  Abdominal:      Palpations: Abdomen is soft  Tenderness: There is no abdominal tenderness  Musculoskeletal:         General: No swelling, tenderness or deformity  Normal range of motion  Right lower leg: No edema  Left lower leg: No edema  Skin:     General: Skin is warm  Findings: No rash  Neurological:      Mental Status: She is alert            Miroslava Saucedo MD

## 2022-09-15 NOTE — ASSESSMENT & PLAN NOTE
Tolerating Zoloft 100 mg qHS which is moderately effective    However, continues to deal with insomnia and panic attacks  Will continue Zoloft for now  Atarax refilled at higher dose of 50 mg Q6 prn for anxiety  Referral to [facility] ordered  Will schedule with Dr Harris for psychotherapy which patient will highly benefit from

## 2022-09-16 ENCOUNTER — TELEPHONE (OUTPATIENT)
Dept: OTHER | Facility: HOSPITAL | Age: 32
End: 2022-09-16

## 2022-09-16 DIAGNOSIS — N93.9 ABNORMAL UTERINE BLEEDING: ICD-10-CM

## 2022-09-16 DIAGNOSIS — Q63.2 PELVIC KIDNEY: Primary | ICD-10-CM

## 2022-09-16 NOTE — TELEPHONE ENCOUNTER
Formerly named [facility] Surgical Case Review  Date Reviewed: 9/9/22  Reviewed by: Dr Molly Way  Case request: MONIKA Hough, possible bilateral ureteral stent placement, cystoscopy, all other indicated procedures   Indication: Abnormal uterine bleeding   Surgical Consent: Will need new consent signed   MA30/31: 8/11/22    Case request approved: Yes, to be scheduled with Dr Rahul Jj pending tasks    Comments:  Case discussed with Dr Rahul Jj on 9/16/22  He would prefer to see the patient in his office first for a consult and to sign consent  His office will then schedule the case  Gyn Onc referral placed 9/16/22       [ ] Patient will need an EMB scheduled with [facility] office   [ ] Will need to determine when she should hold her Xarelto pre-operatively (to be discussed with Dr Rahul Jj)      Arnie Steele MD  PGY-4, OBGYN  09/16/22

## 2022-09-19 ENCOUNTER — TELEPHONE (OUTPATIENT)
Dept: GYNECOLOGIC ONCOLOGY | Facility: CLINIC | Age: 32
End: 2022-09-19

## 2022-09-19 DIAGNOSIS — N93.9 ABNORMAL UTERINE BLEEDING: Primary | ICD-10-CM

## 2022-09-22 ENCOUNTER — CLINICAL SUPPORT (OUTPATIENT)
Dept: FAMILY MEDICINE CLINIC | Facility: CLINIC | Age: 32
End: 2022-09-22

## 2022-09-22 DIAGNOSIS — Z87.828 HISTORY OF TRAUMA: ICD-10-CM

## 2022-09-22 DIAGNOSIS — F41.1 GAD (GENERALIZED ANXIETY DISORDER): Primary | ICD-10-CM

## 2022-09-22 DIAGNOSIS — Z87.828 HISTORY OF TRAUMA: Primary | ICD-10-CM

## 2022-09-22 DIAGNOSIS — F41.1 GAD (GENERALIZED ANXIETY DISORDER): ICD-10-CM

## 2022-09-22 NOTE — PROGRESS NOTES
Patient has a hx of trauma exposure that has never been addressed  She is interested in psychotherapy only      Thank you    Dr Hawk Perkins no

## 2022-09-22 NOTE — PROGRESS NOTES
Martina Pablo has a hx of trauma exposure  She is interested in pursuing therapy  She works for [employer] and would like to pursue therapy elsewhere  Please assist in connecting her with resources  I am placing a referral for nooked as well       Thanks    Dr Harry Olivas
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
## 2022-09-28 ENCOUNTER — TELEPHONE (OUTPATIENT)
Dept: GYNECOLOGIC ONCOLOGY | Facility: CLINIC | Age: 32
End: 2022-09-28

## 2022-09-29 ENCOUNTER — PROCEDURE VISIT (OUTPATIENT)
Dept: OBGYN CLINIC | Facility: CLINIC | Age: 32
End: 2022-09-29

## 2022-09-29 VITALS
SYSTOLIC BLOOD PRESSURE: 131 MMHG | DIASTOLIC BLOOD PRESSURE: 62 MMHG | WEIGHT: 170 LBS | BODY MASS INDEX: 27.32 KG/M2 | HEIGHT: 66 IN | HEART RATE: 97 BPM

## 2022-09-29 DIAGNOSIS — Z01.818 PRE-OP EXAM: ICD-10-CM

## 2022-09-29 DIAGNOSIS — N93.9 ABNORMAL UTERINE BLEEDING: Primary | ICD-10-CM

## 2022-09-29 LAB — SL AMB POCT URINE HCG: NEGATIVE

## 2022-09-29 PROCEDURE — 58100 BIOPSY OF UTERUS LINING: CPT | Performed by: OBSTETRICS & GYNECOLOGY

## 2022-09-29 PROCEDURE — 88305 TISSUE EXAM BY PATHOLOGIST: CPT | Performed by: STUDENT IN AN ORGANIZED HEALTH CARE EDUCATION/TRAINING PROGRAM

## 2022-09-29 PROCEDURE — 81025 URINE PREGNANCY TEST: CPT | Performed by: OBSTETRICS & GYNECOLOGY

## 2022-09-29 RX ORDER — MEGESTROL ACETATE 40 MG/1
40 TABLET ORAL DAILY
Qty: 30 TABLET | Refills: 0 | Status: SHIPPED | OUTPATIENT
Start: 2022-09-29

## 2022-09-29 NOTE — PROGRESS NOTES
Endometrial biopsy    Date/Time: 9/29/2022 4:14 PM  Performed by: Ruthie Gold DO  Authorized by: Ruthie Gold DO   Universal Protocol:  Procedure performed by: (Dr Lisa Montalvo)  Consent: Verbal consent obtained  Written consent obtained  Consent given by: patient  Patient understanding: patient states understanding of the procedure being performed  Patient consent: the patient's understanding of the procedure matches consent given  Procedure consent: procedure consent matches procedure scheduled  Patient identity confirmed: verbally with patient      Indication:     Indications: Other disorder of menstruation and other abnormal bleeding from female genital tract    Procedure:     Procedure: endometrial biopsy with Pipelle      A bivalve speculum was placed in the vagina: yes      Cervix cleaned and prepped: yes      The cervix was dilated: yes      Uterus sound depth (cm):  9    Specimen collected: specimen collected and sent to pathology      Patient tolerated procedure well with no complications: yes    Findings:     Uterus size:  9-10 weeks    Cervix: normal    Comments:     Procedure comments:  Has pre-op appt for [facility] with Dr Kwasi Aragon tomorrow      Patient is still bleeding heavily with xarelto, Megace 40 mg qD sent to her pharmacy to palliate bleeding until hysterectomy is scheduled  Rachel Steele 89, DO  Obstetrics & Gynecology PGY-3  9/29/2022  4:16 PM

## 2022-09-30 ENCOUNTER — CONSULT (OUTPATIENT)
Dept: GYNECOLOGIC ONCOLOGY | Facility: CLINIC | Age: 32
End: 2022-09-30
Payer: MEDICARE

## 2022-09-30 ENCOUNTER — PATIENT MESSAGE (OUTPATIENT)
Dept: GYNECOLOGIC ONCOLOGY | Facility: CLINIC | Age: 32
End: 2022-09-30

## 2022-09-30 VITALS
DIASTOLIC BLOOD PRESSURE: 98 MMHG | SYSTOLIC BLOOD PRESSURE: 140 MMHG | OXYGEN SATURATION: 99 % | BODY MASS INDEX: 27.32 KG/M2 | HEART RATE: 90 BPM | WEIGHT: 170 LBS | HEIGHT: 66 IN | TEMPERATURE: 96 F

## 2022-09-30 DIAGNOSIS — N93.9 ABNORMAL UTERINE BLEEDING: Primary | ICD-10-CM

## 2022-09-30 DIAGNOSIS — Q63.2 PELVIC KIDNEY: ICD-10-CM

## 2022-09-30 PROCEDURE — 99245 OFF/OP CONSLTJ NEW/EST HI 55: CPT | Performed by: OBSTETRICS & GYNECOLOGY

## 2022-09-30 RX ORDER — GABAPENTIN 100 MG/1
100 CAPSULE ORAL ONCE
OUTPATIENT
Start: 2022-09-30 | End: 2022-09-30

## 2022-09-30 RX ORDER — ACETAMINOPHEN 325 MG/1
975 TABLET ORAL ONCE
OUTPATIENT
Start: 2022-09-30 | End: 2022-09-30

## 2022-09-30 RX ORDER — SODIUM CHLORIDE, SODIUM LACTATE, POTASSIUM CHLORIDE, CALCIUM CHLORIDE 600; 310; 30; 20 MG/100ML; MG/100ML; MG/100ML; MG/100ML
125 INJECTION, SOLUTION INTRAVENOUS CONTINUOUS
OUTPATIENT
Start: 2022-09-30

## 2022-09-30 RX ORDER — CEFAZOLIN SODIUM 1 G/50ML
1000 SOLUTION INTRAVENOUS ONCE
OUTPATIENT
Start: 2022-09-30 | End: 2022-09-30

## 2022-09-30 NOTE — ASSESSMENT & PLAN NOTE
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  We discussed perioperative management of her therapeutic anticoagulation  2  I discussed the risks and benefits of robotic assisted total laparoscopic hysterectomy, bilateral salpingectomy, possible oophorectomy, possible exploratory laparotomy, possible cystoscopy with ureteral catheter placement, all other indicated procedures to treat her abnormal uterine bleeding, malpositioned IUD  She understands the risks and benefits of the operation and agrees to proceed as outlined  Consent for surgery was obtained by me in the office  3  She understands that hysterectomy is unlikely to affect her left leg pain  She will consider consultation with Orthopedics  4  We also discussed management of palliative megace  She understands she will not require palliative megace postoperatively  5  Will follow-up results of endometrial biopsy  Thank you for the courtesy of this consultation  All questions were answered by the end of the visit

## 2022-09-30 NOTE — PROGRESS NOTES
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  We discussed perioperative management of her therapeutic anticoagulation  2  I discussed the risks and benefits of robotic assisted total laparoscopic hysterectomy, bilateral salpingectomy, possible oophorectomy, possible exploratory laparotomy, possible cystoscopy with ureteral catheter placement, all other indicated procedures to treat her abnormal uterine bleeding, malpositioned IUD  She understands the risks and benefits of the operation and agrees to proceed as outlined  Consent for surgery was obtained by me in the office  3  She understands that hysterectomy is unlikely to affect her left leg pain  She will consider consultation with Orthopedics  4  We also discussed management of palliative megace  She understands she will not require palliative megace postoperatively  5  Will follow-up results of endometrial biopsy  Thank you for the courtesy of this consultation  All questions were answered by the end of the visit  Relevant Orders    Case request operating room: HYSTERECTOMY LAPAROSCOPIC TOTAL ([address] [place] [address]) W/ ROBOTICS, CYSTOSCOPY, INSERTION URETERAL CATHETERS (Completed)    Type and screen    Comprehensive metabolic panel    CBC and Platelet    HEMOGLOBIN A1C W/ EAG ESTIMATION              CHIEF COMPLAINT:  Abnormal uterine bleeding, right ovarian vein thrombosis          Patient ID: Kit Johnson is a 28 y o  female  79-year-old para 4 referred by Dr Brit Gonzales to discuss surgical treatment options for abnormal uterine bleeding  She was admitted from August 2nd to 8/4/2022 with abdominal pain and found to have a right ovarian vein thrombosis extending into the IVC  She was started on therapeutic anticoagulation  Hematology consultation was obtained  I reviewed the images with the radiologist   She has an IUD in Situ that is malpositioned  She has a long history of abnormal uterine bleeding which has affected her quality of life  Her last hemoglobin in August of 2022 is 13 8 grams/deciliter  Of note in the CT images, she has a pelvic kidney  She was therefore referred to Gynecologic Oncology to discuss the surgical management  She is currently not having significant vaginal bleeding  She does have left leg pain in the mid thigh  She notes significant stiffness of her leg after long drives or sitting for a long time  She notes occasional numbness in her foot  She is able to perform her activities of daily living  Review of Systems   Constitutional: Negative for activity change and unexpected weight change  HENT: Negative  Eyes: Negative  Respiratory: Negative  Cardiovascular: Negative  Gastrointestinal: Negative for abdominal distention and abdominal pain  Endocrine: Negative  Genitourinary: Positive for menstrual problem  Negative for pelvic pain and vaginal bleeding  Musculoskeletal: Positive for myalgias  Skin: Negative  Allergic/Immunologic: Negative  Neurological: Negative  Hematological: Negative  Psychiatric/Behavioral: Negative          Current Outpatient Medications   Medication Sig Dispense Refill    hydrOXYzine HCL (ATARAX) 50 mg tablet Take 0 5 tablets (25 mg total) by mouth every 6 (six) hours as needed for anxiety 90 tablet 1    megestrol (MEGACE) 40 mg tablet Take 1 tablet (40 mg total) by mouth daily 30 tablet 0    ondansetron (ZOFRAN-ODT) 4 mg disintegrating tablet TAKE 1 TABLET BY MOUTH EVERY 8 HOURS AS NEEDED FOR NAUSEA AND VOMITING      rivaroxaban (Xarelto) 20 mg tablet Take 1 tablet (20 mg total) by mouth daily with breakfast 70 tablet 0    sertraline (ZOLOFT) 100 mg tablet Take 2 tablets (200 mg total) by mouth daily at bedtime 90 tablet 2     No current facility-administered medications for this visit  Allergies   Allergen Reactions    Metoclopramide Hives       Past Medical History:   Diagnosis Date    Depression     History of positive PPD     Known health problems: none     Pelvic kidney     Pelvic kidney     left side       Past Surgical History:   Procedure Laterality Date    APPENDECTOMY      ME COLPOSCOPY,CERVIX W/ADJ VAG,W/LOOP BX N/A 2020    Procedure: BIOPSY CONE/COLD KNIFE CERVIX, ECC;  Surgeon: Sergei Nobles MD;  Location: [facility];  Service: Gynecology       OB History        4    Para   4    Term   3       1    AB        Living   4       SAB        IAB        Ectopic        Multiple   0    Live Births   4                 Family History   Problem Relation Age of Onset    Crohn's disease Brother     Crohn's disease Maternal Aunt     Diabetes Maternal Grandmother     Diabetes Maternal Grandfather     Diabetes Paternal Grandmother     Diabetes Paternal Grandfather     COPD Mother     Diabetes Father     Hypertension Father     Depression Sister        The following portions of the patient's history were reviewed and updated as appropriate: allergies, current medications, past family history, past medical history, past social history, past surgical history and problem list       Objective:    Blood pressure 140/98, pulse 90, temperature (!) 96 °F (35 6 °C), temperature source Temporal, height 5' 6" (1 676 m), weight 77 1 kg (170 lb), SpO2 99 %, not currently breastfeeding  Body mass index is 27 44 kg/m²  Physical Exam  Vitals reviewed     Constitutional:       General: She is not in acute distress  Appearance: Normal appearance  She is normal weight  She is not ill-appearing, toxic-appearing or diaphoretic  HENT:      Head: Normocephalic and atraumatic  Mouth/Throat:      Mouth: Mucous membranes are moist    Eyes:      General: No scleral icterus  Right eye: No discharge  Left eye: No discharge  Conjunctiva/sclera: Conjunctivae normal    Cardiovascular:      Rate and Rhythm: Normal rate and regular rhythm  Heart sounds: Normal heart sounds  Pulmonary:      Effort: Pulmonary effort is normal       Breath sounds: Normal breath sounds  Musculoskeletal:      Right lower leg: No edema  Left lower leg: No edema  Skin:     General: Skin is warm and dry  Coloration: Skin is not jaundiced  Findings: No rash  Neurological:      General: No focal deficit present  Mental Status: She is alert and oriented to person, place, and time  Cranial Nerves: No cranial nerve deficit  Motor: No weakness  Gait: Gait normal    Psychiatric:         Mood and Affect: Mood normal          Behavior: Behavior normal          Thought Content:  Thought content normal          Judgment: Judgment normal            No results found for:   Lab Results   Component Value Date    WBC 9 42 08/02/2022    HGB 13 8 08/02/2022    HCT 42 3 08/02/2022    MCV 96 08/02/2022     08/02/2022     Lab Results   Component Value Date     (L) 04/10/2015    K 3 5 08/02/2022     (H) 08/02/2022    CO2 26 08/02/2022    ANIONGAP 9 04/10/2015    BUN 9 08/02/2022    CREATININE 0 82 08/02/2022    GLUCOSE 88 09/22/2016    GLUF 94 02/05/2022    CALCIUM 9 3 08/02/2022    AST 17 08/02/2022    ALT 28 08/02/2022    ALKPHOS 78 08/02/2022    PROT 6 0 (L) 04/10/2015    BILITOT 0 29 04/10/2015    EGFR 94 08/02/2022        Trend:  No results found for:

## 2022-09-30 NOTE — PATIENT INSTRUCTIONS
1  Nothing to eat or drink after midnight prior to the operation  You are allowed clear liquids until 3 hours prior to the scheduled start time of surgery  No milk products or solid food for 8 hours prior to surgery  2   Please avoid ibuprofen, aspirin, fish oil for 7 days prior to surgery  3  Please stop your Xarelto 3 days prior to surgery  Do not take any medications the day of surgery  4  You may resume Xarelto two days after surgery

## 2022-10-03 ENCOUNTER — NURSE TRIAGE (OUTPATIENT)
Dept: OTHER | Facility: OTHER | Age: 32
End: 2022-10-03

## 2022-10-03 NOTE — TELEPHONE ENCOUNTER
Reason for Disposition   Taking Coumadin (warfarin) or other strong blood thinner, or known bleeding disorder (e g , thrombocytopenia)    Answer Assessment - Initial Assessment Questions  1  AMOUNT OF BLEEDING: "How bad is the bleeding?" "How much blood was lost?" "Has the bleeding stopped?"    - MILD: needed a couple tissues    - MODERATE: needed many tissues    - SEVERE: large blood clots, soaked many tissues, lasted more than 30 minutes       Both nostrils and and mouth (bright red and clots out mouth)  2  ONSET: "When did the nosebleed start?"     today  3  FREQUENCY: "How many nosebleeds have you had in the last 24 hours?"       Just   4  RECURRENT SYMPTOMS: "Have there been other recent nosebleeds?" If Yes, ask: "How long did it take you to stop the bleeding?" "What worked best?"       As a child  5  CAUSE: "What do you think caused this nosebleed?"      xeralto  6  LOCAL FACTORS: "Do you have any cold symptoms?", "Have you been rubbing or picking at your nose?"    Recent cold - sniffles   7  SYSTEMIC FACTORS: "Do you have high blood pressure or any bleeding problems?"  Denies   8  BLOOD THINNERS: "Do you take any blood thinners?" (e g , coumadin, heparin, aspirin, Plavix)      xarelto  9   OTHER SYMPTOMS: "Do you have any other symptoms?" (e g , lightheadedness)     Denies    Prot[facility]

## 2022-10-03 NOTE — TELEPHONE ENCOUNTER
The clot didn't disappear  It was reviewed by the radiologist and I reviewed it with them as well  It was still there 24hrs later  I left her a message stating that she will need to continue her blood thinners after the surgery

## 2022-10-03 NOTE — TELEPHONE ENCOUNTER
Regarding: nose bleed  ----- Message from Johanne Cruz sent at 10/3/2022  6:51 AM EDT -----  " My nose just started to bleed from both nostrils uncontrollably around 6:19 am, I'm not sure how to stop it "

## 2022-10-03 NOTE — TELEPHONE ENCOUNTER
Called patient, states her nosebleed did subside after approx 45 minutes, has had no reoccurrence since this episode  She states that she did see Gyn Oncology on Friday and they want her to continue Xarelto  She did state she has slight cold with sneezing  Counseled her on going to ER if she has nosebleed that is uncontrollable, she will treat her URI symptoms supportively

## 2022-10-03 NOTE — TELEPHONE ENCOUNTER
Patient called in stating around 617 started with nose bleed with bright red blood and it was also coming out of her mouth with clots  States she is on Xarelto which started in Aug  States nose bleed as a child  Bleeding stopped at 650  Advised patient on appointment with PCP  States she will call ob office due to them placing her on Xarelto  States due to her PCP not in office she will not see anyone else  Advise the importance to be seen then at [facility]

## 2022-10-05 ENCOUNTER — TELEPHONE (OUTPATIENT)
Dept: FAMILY MEDICINE CLINIC | Facility: CLINIC | Age: 32
End: 2022-10-05

## 2022-10-05 NOTE — TELEPHONE ENCOUNTER
Patient called this morning about nose bleeding  She states her nose keeps on bleeding and has not stopped  Patient is on Blood thinners  Call was transferred to Martha Ortiz

## 2022-10-05 NOTE — TELEPHONE ENCOUNTER
Received call from patient  Per patient, she has had daily nosebleeds that started on 10/03/2022  Patient states she takes Xarelto 20mg tabs daily  Patient states she had a nosebleed that lasted for 43 minutes on 10/03/2022, one on 10/04/2022 that lasted for 17 minutes, two on 10/05/2022 that lasted for 7&5 minutes (2 separate occurrences)  Patient states the blood is also going into the back of her throat  Patient has been scheduled for an appointment on 10/05/2022 @ 04:40PM with Dr Naresh Salas  Patient accepted appointment  There are no appointments available today  Patient was advised that if nosebleed persists, she should present to Urgent Care or ED for evaluation  Patient verbalized understanding

## 2022-10-06 ENCOUNTER — PATIENT OUTREACH (OUTPATIENT)
Dept: FAMILY MEDICINE CLINIC | Facility: CLINIC | Age: 32
End: 2022-10-06

## 2022-10-06 NOTE — PROGRESS NOTES
MATILDE LAZCANO received a return call from patient  MATILDE LAZCANO introduced self and explained role  Patient reported that she is interested in counseling and psychiatry  Patient reported that she is available for appointments 3pm or later and would like support with reaching out to agencies  MATILDE LAZCANO assisted with calling [facility] and [facility]  MATILDE LAZCANO also assisted with calling [facility] and [facility] CM will await returning call from agencies to schedule potential intake appointment  MATILDE LAZCANO will remain available and provide patient with support as necessary
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
## 2022-10-06 NOTE — PROGRESS NOTES
MATILDE LAZCANO received a return call from [employer]  Patient was scheduled for an appointment on November 15th at 3:30pm  Patient will need to bring a mask, ID, and insurance card  MATILDE LAZCANO outreached patient and informed her of scheduled appointment  Patient was receptive and thankful  Patient denied any further needs at this time  MATILDE LAZCANO will be closing case as goal of scheduling outpatient mental health has been accomplished  MATILDE LACZANO will remain available to provide psychosocial support as necessary

## 2022-10-07 ENCOUNTER — OFFICE VISIT (OUTPATIENT)
Dept: HEMATOLOGY ONCOLOGY | Facility: CLINIC | Age: 32
End: 2022-10-07
Payer: MEDICARE

## 2022-10-07 VITALS
SYSTOLIC BLOOD PRESSURE: 120 MMHG | HEIGHT: 66 IN | HEART RATE: 90 BPM | RESPIRATION RATE: 17 BRPM | WEIGHT: 167 LBS | OXYGEN SATURATION: 99 % | DIASTOLIC BLOOD PRESSURE: 78 MMHG | BODY MASS INDEX: 26.84 KG/M2

## 2022-10-07 DIAGNOSIS — M79.605 PAIN OF LEFT LOWER EXTREMITY: Primary | ICD-10-CM

## 2022-10-07 DIAGNOSIS — D50.0 IRON DEFICIENCY ANEMIA DUE TO CHRONIC BLOOD LOSS: ICD-10-CM

## 2022-10-07 DIAGNOSIS — I82.890 THROMBOSIS OF OVARIAN VEIN: ICD-10-CM

## 2022-10-07 PROCEDURE — 99244 OFF/OP CNSLTJ NEW/EST MOD 40: CPT | Performed by: INTERNAL MEDICINE

## 2022-10-11 NOTE — PROGRESS NOTES
HEMATOLOGY / [address] FOLLOW UP NOTE    Primary Care Provider: Keegan Miller MD  Referring Provider:    MRN: [id number]  : 1990    Reason for Encounter: ovarian vein thrombosis     Interval History: patient presents for evaluation of her ovarian vein thrombosis  She was admitted with left sided abdominal/flank pain  CT scan revealed a right sided ovarian vein thrombosis extending to the IVC  She is currently on xarelto  She is having heavy menstrual bleeding and is planning hysterectomy with Dr Shyanne Sanchez  There was no obvious provoking factor to the ovarian vein thrombosis  She did take a trip to [facility] after that and since that time states that she has been having L>R calf pain  She has also had 3 nosebleeds on xarelto, with the longest lasting 42 minutes  She did not need to go to the ER and has not seen an ENT  This was her first thrombotic event  She had four pregnancies with no clotting  She did take OCPs in the past with no clots  She smokes 5 cigarettes per day  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  She drinks Etoh socially  Famhx:    MGM - DVT  Mat aunt - DVT  GGM - CVA - age 39         REVIEW OF SYSTEMS:  Please note that a 14-point review of systems was performed to include Constitutional, HEENT, Respiratory, CVS, GI, , Musculoskeletal, Integumentary, Neurologic, Rheumatologic, Endocrinologic, Psychiatric, Lymphatic, and Hematologic/Oncologic systems were reviewed and are negative unless otherwise stated in HPI  Positive and negative findings pertinent to this evaluation are incorporated into the history of present illness  ECOG PS: 0    PROBLEM LIST:  Patient Active Problem List   Diagnosis   • Spontaneous vaginal delivery   • IUD threads lost   • Major depressive disorder   • Pelvic kidney   • Abnormal uterine bleeding   • RADHA (generalized anxiety disorder)   • Adjustment insomnia   • Overweight (BMI 25 0-29  9)   • Tension headache   • Thrombosis of ovarian vein   • Abdominal pain       Assessment / Plan: The fact that the patient survived 4 pregnancies without a thrombotic event speaks against an inherited clotting disorder  She does have a famhx , so I will check a PTGM and FVL if her insurance will allow  I also also check lupus ab titers  I am going to US her LE due to pain, although a DVT is unlikely while on xarelto  She states she has been compliant  If it wasn't for the pain she had in the LLQ , we would not have found the ovarian vein thrombosis, and her LLQ pain has not gotten better with treatment  I would lean towards a limited course of treatment  I am going to see her in follow up in 1 month when the workup is resulted  I will likely keep the xarelto going for 6 weeks post op because that will be high risk time for thrombosis  If she could also stop smoking that would take away another risk factor  I spent 45 minutes on chart review, face to face counseling time, coordination of care and documentation  Past Medical History:   has a past medical history of Depression, History of positive PPD, Known health problems: none, Pelvic kidney, and Pelvic kidney  PAST SURGICAL HISTORY:   has a past surgical history that includes Appendectomy and pr colposcopy,cervix w/adj vag,w/loop bx (N/A, 7/31/2020)      CURRENT MEDICATIONS  Current Outpatient Medications   Medication Sig Dispense Refill   • hydrOXYzine HCL (ATARAX) 50 mg tablet Take 0 5 tablets (25 mg total) by mouth every 6 (six) hours as needed for anxiety 90 tablet 1   • megestrol (MEGACE) 40 mg tablet Take 1 tablet (40 mg total) by mouth daily 30 tablet 0   • ondansetron (ZOFRAN-ODT) 4 mg disintegrating tablet TAKE 1 TABLET BY MOUTH EVERY 8 HOURS AS NEEDED FOR NAUSEA AND VOMITING     • rivaroxaban (Xarelto) 20 mg tablet Take 1 tablet (20 mg total) by mouth daily with breakfast 30 tablet 2   • sertraline (ZOLOFT) 100 mg tablet Take 2 tablets (200 mg total) by mouth daily at bedtime 90 tablet 2     No current facility-administered medications for this visit  [unfilled]    SOCIAL HISTORY:   reports that she has been smoking cigarettes  She has been smoking about 0 25 packs per day  She has never used smokeless tobacco  She reports current alcohol use  She reports that she does not use drugs  FAMILY HISTORY:  family history includes COPD in her mother; Crohn's disease in her brother and maternal aunt; Depression in her sister; Diabetes in her father, maternal grandfather, maternal grandmother, paternal grandfather, and paternal grandmother; Hypertension in her father  ALLERGIES:  is allergic to metoclopramide  Physical Exam:  Vital Signs:   Visit Vitals  /78 (BP Location: Left arm, Patient Position: Sitting, Cuff Size: Adult)   Pulse 90   Resp 17   Ht 5' 6" (1 676 m)   Wt 75 8 kg (167 lb)   LMP  (LMP Unknown)   SpO2 99%   BMI 26 95 kg/m²   OB Status Unknown   Smoking Status Current Some Day Smoker   BSA 1 85 m²     Body mass index is 26 95 kg/m²  Body surface area is 1 85 meters squared  GEN: Alert, awake oriented x3, in no acute distress  HEENT- No pallor, icterus, cyanosis, no oral mucosal lesions,   LAD - no palpable cervical, clavicle, axillary, inguinal LAD  Heart- normal S1 S2, regular rate and rhythm, No murmur, rubs     Lungs- clear breathing sound bilateral    Abdomen- soft, Non tender, bowel sounds present  Extremities- No cyanosis, clubbing, edema  Neuro- No focal neurological deficit    Labs:  Lab Results   Component Value Date    WBC 9 42 08/02/2022    HGB 13 8 08/02/2022    HCT 42 3 08/02/2022    MCV 96 08/02/2022     08/02/2022     Lab Results   Component Value Date     (L) 04/10/2015    SODIUM 139 08/02/2022    K 3 5 08/02/2022     (H) 08/02/2022    CO2 26 08/02/2022    ANIONGAP 9 04/10/2015    AGAP 2 (L) 08/02/2022    BUN 9 08/02/2022    CREATININE 0 82 08/02/2022    GLUC 120 08/02/2022    GLUF 94 02/05/2022    CALCIUM 9 3 08/02/2022    AST 17 08/02/2022    ALT 28 08/02/2022    ALKPHOS 78 08/02/2022    PROT 6 0 (L) 04/10/2015    TP 6 9 08/02/2022    BILITOT 0 29 04/10/2015    TBILI 0 34 08/02/2022    EGFR 94 08/02/2022

## 2022-10-12 ENCOUNTER — TELEPHONE (OUTPATIENT)
Dept: GYNECOLOGIC ONCOLOGY | Facility: CLINIC | Age: 32
End: 2022-10-12

## 2022-10-12 ENCOUNTER — PATIENT MESSAGE (OUTPATIENT)
Dept: GYNECOLOGIC ONCOLOGY | Facility: CLINIC | Age: 32
End: 2022-10-12

## 2022-10-19 ENCOUNTER — TELEPHONE (OUTPATIENT)
Dept: GYNECOLOGIC ONCOLOGY | Facility: CLINIC | Age: 32
End: 2022-10-19

## 2022-10-21 ENCOUNTER — HOSPITAL ENCOUNTER (OUTPATIENT)
Dept: NON INVASIVE DIAGNOSTICS | Facility: CLINIC | Age: 32
Discharge: HOME | End: 2022-10-21
Payer: MEDICARE

## 2022-10-21 DIAGNOSIS — M79.605 PAIN OF LEFT LOWER EXTREMITY: ICD-10-CM

## 2022-10-21 PROCEDURE — 93970 EXTREMITY STUDY: CPT

## 2022-10-21 PROCEDURE — 93970 EXTREMITY STUDY: CPT | Performed by: SURGERY

## 2022-10-27 ENCOUNTER — CLINICAL SUPPORT (OUTPATIENT)
Dept: FAMILY MEDICINE CLINIC | Facility: CLINIC | Age: 32
End: 2022-10-27

## 2022-10-27 DIAGNOSIS — Z87.828 HISTORY OF TRAUMA: Primary | ICD-10-CM

## 2022-10-27 PROBLEM — F43.10 PTSD (POST-TRAUMATIC STRESS DISORDER): Status: ACTIVE | Noted: 2022-10-27
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
## 2022-11-05 ENCOUNTER — APPOINTMENT (OUTPATIENT)
Dept: LAB | Facility: CLINIC | Age: 32
End: 2022-11-05

## 2022-11-05 DIAGNOSIS — I82.890 THROMBOSIS OF OVARIAN VEIN: ICD-10-CM

## 2022-11-05 DIAGNOSIS — D50.0 IRON DEFICIENCY ANEMIA DUE TO CHRONIC BLOOD LOSS: ICD-10-CM

## 2022-11-05 LAB
BASOPHILS # BLD AUTO: 0.08 THOUSANDS/ÂΜL (ref 0–0.1)
BASOPHILS NFR BLD AUTO: 1 % (ref 0–1)
EOSINOPHIL # BLD AUTO: 0.19 THOUSAND/ÂΜL (ref 0–0.61)
EOSINOPHIL NFR BLD AUTO: 2 % (ref 0–6)
ERYTHROCYTE [DISTWIDTH] IN BLOOD BY AUTOMATED COUNT: 12.9 % (ref 11.6–15.1)
FERRITIN SERPL-MCNC: 76 NG/ML (ref 8–388)
HCT VFR BLD AUTO: 43 % (ref 34.8–46.1)
HGB BLD-MCNC: 14.1 G/DL (ref 11.5–15.4)
IMM GRANULOCYTES # BLD AUTO: 0.02 THOUSAND/UL (ref 0–0.2)
IMM GRANULOCYTES NFR BLD AUTO: 0 % (ref 0–2)
IRON SATN MFR SERPL: 35 % (ref 15–50)
IRON SERPL-MCNC: 124 UG/DL (ref 50–170)
LYMPHOCYTES # BLD AUTO: 1.56 THOUSANDS/ÂΜL (ref 0.6–4.47)
LYMPHOCYTES NFR BLD AUTO: 18 % (ref 14–44)
MCH RBC QN AUTO: 30.6 PG (ref 26.8–34.3)
MCHC RBC AUTO-ENTMCNC: 32.8 G/DL (ref 31.4–37.4)
MCV RBC AUTO: 93 FL (ref 82–98)
MONOCYTES # BLD AUTO: 0.62 THOUSAND/ÂΜL (ref 0.17–1.22)
MONOCYTES NFR BLD AUTO: 7 % (ref 4–12)
NEUTROPHILS # BLD AUTO: 6.41 THOUSANDS/ÂΜL (ref 1.85–7.62)
NEUTS SEG NFR BLD AUTO: 72 % (ref 43–75)
NRBC BLD AUTO-RTO: 0 /100 WBCS
PLATELET # BLD AUTO: 171 THOUSANDS/UL (ref 149–390)
PMV BLD AUTO: 11.2 FL (ref 8.9–12.7)
RBC # BLD AUTO: 4.61 MILLION/UL (ref 3.81–5.12)
TIBC SERPL-MCNC: 358 UG/DL (ref 250–450)
WBC # BLD AUTO: 8.88 THOUSAND/UL (ref 4.31–10.16)

## 2022-11-08 LAB
B2 GLYCOPROT1 IGA SERPL IA-ACNC: 1.7
B2 GLYCOPROT1 IGG SERPL IA-ACNC: 0.8
B2 GLYCOPROT1 IGM SERPL IA-ACNC: <2.4
CARDIOLIPIN IGA SER IA-ACNC: 1.6
CARDIOLIPIN IGG SER IA-ACNC: 0.7
CARDIOLIPIN IGM SER IA-ACNC: 2.5

## 2022-11-10 LAB — F5 GENE MUT ANL BLD/T: NORMAL

## 2022-11-11 ENCOUNTER — OFFICE VISIT (OUTPATIENT)
Dept: HEMATOLOGY ONCOLOGY | Facility: CLINIC | Age: 32
End: 2022-11-11

## 2022-11-11 VITALS
TEMPERATURE: 97.9 F | WEIGHT: 171 LBS | DIASTOLIC BLOOD PRESSURE: 18 MMHG | BODY MASS INDEX: 27.48 KG/M2 | OXYGEN SATURATION: 99 % | SYSTOLIC BLOOD PRESSURE: 120 MMHG | HEIGHT: 66 IN | HEART RATE: 77 BPM

## 2022-11-11 DIAGNOSIS — D50.0 IRON DEFICIENCY ANEMIA DUE TO CHRONIC BLOOD LOSS: Primary | ICD-10-CM

## 2022-11-14 LAB — F2 GENE MUT ANL BLD/T: NORMAL

## 2022-11-18 NOTE — PROGRESS NOTES
HEMATOLOGY / [address] FOLLOW UP NOTE    Primary Care Provider: Dominique Espinoza MD  Referring Provider:    MRN: [id number]  : 1990    Reason for Encounter: follow up right ovarian vein thrombosis     Interval History: patient presents for follow up of her ovarian vein thrombosis  She is on xarelto and her periods have been heavy  Her ferritin prior to this visit was 76, %sat = 35, hgb = 14 1  She had lupus antibody testing that was negative  FVL gene mutation was negative  PTGM was negative  She was having some pain in the LLE after our last visit, so I repeated LE doppler that were negative  She is very tired  Her hysterectomy is scheduled for  22  She denies any CP/SOB  No other new complaints  REVIEW OF SYSTEMS:  Please note that a 14-point review of systems was performed to include Constitutional, HEENT, Respiratory, CVS, GI, , Musculoskeletal, Integumentary, Neurologic, Rheumatologic, Endocrinologic, Psychiatric, Lymphatic, and Hematologic/Oncologic systems were reviewed and are negative unless otherwise stated in HPI  Positive and negative findings pertinent to this evaluation are incorporated into the history of present illness  ECOG PS: 0    PROBLEM LIST:  Patient Active Problem List   Diagnosis   • Spontaneous vaginal delivery   • IUD threads lost   • Major depressive disorder   • Pelvic kidney   • Abnormal uterine bleeding   • RADHA (generalized anxiety disorder)   • Adjustment insomnia   • Overweight (BMI 25 0-29  9)   • Tension headache   • Thrombosis of ovarian vein   • Abdominal pain   • PTSD (post-traumatic stress disorder)       Assessment / Plan: 29 yo female with an incidentally found right ovarian vein thrombosis when she presented with LLQ pain  I am going to have her continue her xarelto for 6 weeks post op and I will see her in the office around that time  Her iron levels are not low enough to consider IV iron right now    I am going to see at the end of January with repeat iron studies  She may need IV iron after having blood loss with surgery  I spent 20 minutes on chart review, face to face counseling time, coordination of care and documentation  Past Medical History:   has a past medical history of Depression, History of positive PPD, Known health problems: none, Pelvic kidney, and Pelvic kidney  PAST SURGICAL HISTORY:   has a past surgical history that includes Appendectomy and pr colposcopy,cervix w/adj vag,w/loop bx (N/A, 7/31/2020)  CURRENT MEDICATIONS  Current Outpatient Medications   Medication Sig Dispense Refill   • hydrOXYzine HCL (ATARAX) 50 mg tablet Take 0 5 tablets (25 mg total) by mouth every 6 (six) hours as needed for anxiety 90 tablet 1   • ondansetron (ZOFRAN-ODT) 4 mg disintegrating tablet TAKE 1 TABLET BY MOUTH EVERY 8 HOURS AS NEEDED FOR NAUSEA AND VOMITING     • rivaroxaban (Xarelto) 20 mg tablet Take 1 tablet (20 mg total) by mouth daily with breakfast 30 tablet 2   • sertraline (ZOLOFT) 100 mg tablet Take 2 tablets (200 mg total) by mouth daily at bedtime 90 tablet 2   • megestrol (MEGACE) 40 mg tablet Take 1 tablet (40 mg total) by mouth daily (Patient not taking: Reported on 11/11/2022) 30 tablet 0     No current facility-administered medications for this visit  [unfilled]    SOCIAL HISTORY:   reports that she has been smoking cigarettes  She has been smoking an average of  25 packs per day  She has never used smokeless tobacco  She reports current alcohol use  She reports that she does not use drugs  FAMILY HISTORY:  family history includes COPD in her mother; Crohn's disease in her brother and maternal aunt; Depression in her sister; Diabetes in her father, maternal grandfather, maternal grandmother, paternal grandfather, and paternal grandmother; Hypertension in her father  ALLERGIES:  is allergic to metoclopramide        Physical Exam:  Vital Signs:   Visit Vitals  BP (!) 120/18 (BP Location: Left arm, Patient Position: Sitting, Cuff Size: Standard)   Pulse 77   Temp 97 9 °F (36 6 °C)   Ht 5' 6" (1 676 m)   Wt 77 6 kg (171 lb)   SpO2 99%   BMI 27 60 kg/m²   OB Status Unknown   Smoking Status Some Days   BSA 1 87 m²     Body mass index is 27 6 kg/m²  Body surface area is 1 87 meters squared  GEN: Alert, awake oriented x3, in no acute distress  HEENT- No pallor, icterus, cyanosis, no oral mucosal lesions,   LAD - no palpable cervical, clavicle, axillary, inguinal LAD  Heart- normal S1 S2, regular rate and rhythm, No murmur, rubs     Lungs- clear breathing sound bilateral    Abdomen- soft, Non tender, bowel sounds present  Extremities- No cyanosis, clubbing, edema  Neuro- No focal neurological deficit    Labs:  Lab Results   Component Value Date    WBC 8 88 11/05/2022    HGB 14 1 11/05/2022    HCT 43 0 11/05/2022    MCV 93 11/05/2022     11/05/2022     Lab Results   Component Value Date     (L) 04/10/2015    SODIUM 139 08/02/2022    K 3 5 08/02/2022     (H) 08/02/2022    CO2 26 08/02/2022    ANIONGAP 9 04/10/2015    AGAP 2 (L) 08/02/2022    BUN 9 08/02/2022    CREATININE 0 82 08/02/2022    GLUC 120 08/02/2022    GLUF 94 02/05/2022    CALCIUM 9 3 08/02/2022    AST 17 08/02/2022    ALT 28 08/02/2022    ALKPHOS 78 08/02/2022    PROT 6 0 (L) 04/10/2015    TP 6 9 08/02/2022    BILITOT 0 29 04/10/2015    TBILI 0 34 08/02/2022    EGFR 94 08/02/2022

## 2022-11-20 DIAGNOSIS — F41.1 GAD (GENERALIZED ANXIETY DISORDER): ICD-10-CM

## 2022-11-20 DIAGNOSIS — F32.1 CURRENT MODERATE EPISODE OF MAJOR DEPRESSIVE DISORDER, UNSPECIFIED WHETHER RECURRENT (HCC): ICD-10-CM

## 2022-11-21 RX ORDER — SERTRALINE HYDROCHLORIDE 100 MG/1
200 TABLET, FILM COATED ORAL
Qty: 90 TABLET | Refills: 2 | Status: SHIPPED | OUTPATIENT
Start: 2022-11-21

## 2022-11-25 ENCOUNTER — TELEPHONE (OUTPATIENT)
Dept: GYNECOLOGIC ONCOLOGY | Facility: CLINIC | Age: 32
End: 2022-11-25

## 2022-11-25 NOTE — TELEPHONE ENCOUNTER
Left message to have to call the office back to provide fax number to have FMLA Paper work sent to her employer

## 2022-12-05 ENCOUNTER — ANESTHESIA EVENT (OUTPATIENT)
Dept: PERIOP | Facility: HOSPITAL | Age: 32
End: 2022-12-05

## 2022-12-07 ENCOUNTER — APPOINTMENT (OUTPATIENT)
Dept: LAB | Facility: CLINIC | Age: 32
End: 2022-12-07

## 2022-12-07 DIAGNOSIS — D50.0 IRON DEFICIENCY ANEMIA DUE TO CHRONIC BLOOD LOSS: ICD-10-CM

## 2022-12-07 DIAGNOSIS — N93.9 ABNORMAL UTERINE BLEEDING: ICD-10-CM

## 2022-12-07 LAB
ABO GROUP BLD: NORMAL
ALBUMIN SERPL BCP-MCNC: 3.5 G/DL (ref 3.5–5)
ALP SERPL-CCNC: 72 U/L (ref 46–116)
ALT SERPL W P-5'-P-CCNC: 40 U/L (ref 12–78)
ANION GAP SERPL CALCULATED.3IONS-SCNC: 7 MMOL/L (ref 4–13)
AST SERPL W P-5'-P-CCNC: 13 U/L (ref 5–45)
BILIRUB SERPL-MCNC: 0.53 MG/DL (ref 0.2–1)
BLD GP AB SCN SERPL QL: NEGATIVE
BUN SERPL-MCNC: 15 MG/DL (ref 5–25)
CALCIUM SERPL-MCNC: 9.1 MG/DL (ref 8.3–10.1)
CHLORIDE SERPL-SCNC: 112 MMOL/L (ref 96–108)
CO2 SERPL-SCNC: 22 MMOL/L (ref 21–32)
CREAT SERPL-MCNC: 0.73 MG/DL (ref 0.6–1.3)
ERYTHROCYTE [DISTWIDTH] IN BLOOD BY AUTOMATED COUNT: 12.6 % (ref 11.6–15.1)
EST. AVERAGE GLUCOSE BLD GHB EST-MCNC: 97 MG/DL
GFR SERPL CREATININE-BSD FRML MDRD: 109 ML/MIN/1.73SQ M
GLUCOSE P FAST SERPL-MCNC: 95 MG/DL (ref 65–99)
HBA1C MFR BLD: 5 %
HCT VFR BLD AUTO: 42.3 % (ref 34.8–46.1)
HGB BLD-MCNC: 14.1 G/DL (ref 11.5–15.4)
MCH RBC QN AUTO: 31.2 PG (ref 26.8–34.3)
MCHC RBC AUTO-ENTMCNC: 33.3 G/DL (ref 31.4–37.4)
MCV RBC AUTO: 94 FL (ref 82–98)
PLATELET # BLD AUTO: 162 THOUSANDS/UL (ref 149–390)
PMV BLD AUTO: 11.3 FL (ref 8.9–12.7)
POTASSIUM SERPL-SCNC: 4 MMOL/L (ref 3.5–5.3)
PROT SERPL-MCNC: 7.2 G/DL (ref 6.4–8.4)
RBC # BLD AUTO: 4.52 MILLION/UL (ref 3.81–5.12)
RH BLD: POSITIVE
SODIUM SERPL-SCNC: 141 MMOL/L (ref 135–147)
SPECIMEN EXPIRATION DATE: NORMAL
WBC # BLD AUTO: 7.54 THOUSAND/UL (ref 4.31–10.16)

## 2022-12-12 DIAGNOSIS — N93.9 ABNORMAL UTERINE BLEEDING: ICD-10-CM

## 2022-12-12 RX ORDER — MEGESTROL ACETATE 40 MG/1
TABLET ORAL
Qty: 30 TABLET | Refills: 0 | Status: SHIPPED | OUTPATIENT
Start: 2022-12-12 | End: 2022-12-14

## 2022-12-14 RX ORDER — QUETIAPINE FUMARATE 25 MG/1
25 TABLET, FILM COATED ORAL
COMMUNITY

## 2022-12-14 RX ORDER — BUPROPION HYDROCHLORIDE 100 MG/1
100 TABLET, EXTENDED RELEASE ORAL
COMMUNITY

## 2022-12-14 NOTE — PRE-PROCEDURE INSTRUCTIONS
Pre-Surgery Instructions:   Medication Instructions   • buPROPion (WELLBUTRIN SR) 100 mg 12 hr tablet Take day of surgery  • hydrOXYzine HCL (ATARAX) 50 mg tablet Uses PRN- OK to take day of surgery   • ondansetron (ZOFRAN-ODT) 4 mg disintegrating tablet Uses PRN- OK to take day of surgery   • QUEtiapine (SEROquel) 25 mg tablet Take night before surgery   • rivaroxaban (Xarelto) 20 mg tablet Pt instructed by hematology to hold starting 12/15/22  • sertraline (ZOLOFT) 100 mg tablet Take day of surgery  Med list reviewed as above  Also instructed pt not to start any new vitamins/supplements preoperatively and to avoid NSAID's  7 days prior to surgery  Tylenol is acceptable if needed  Pt has and/or is getting CHG surgical soap and verbalizes understanding of preoperative showering protocol  Pt instructed to go to surgeon's office to  pre-surgical carb drinks x 3 and I explained timing of consumption  All information within "My Surgical Experience" pamphlet reviewed and patient verbalizes understanding and compliance  All questions answered

## 2022-12-19 ENCOUNTER — HOSPITAL ENCOUNTER (OUTPATIENT)
Facility: HOSPITAL | Age: 32
Setting detail: OUTPATIENT SURGERY
Discharge: HOME | End: 2022-12-19
Attending: OBSTETRICS & GYNECOLOGY | Admitting: OBSTETRICS & GYNECOLOGY

## 2022-12-19 ENCOUNTER — ANESTHESIA (OUTPATIENT)
Dept: PERIOP | Facility: HOSPITAL | Age: 32
End: 2022-12-19

## 2022-12-19 VITALS
RESPIRATION RATE: 18 BRPM | TEMPERATURE: 97.2 F | BODY MASS INDEX: 27 KG/M2 | HEART RATE: 78 BPM | HEIGHT: 66 IN | WEIGHT: 168 LBS | DIASTOLIC BLOOD PRESSURE: 53 MMHG | SYSTOLIC BLOOD PRESSURE: 90 MMHG | OXYGEN SATURATION: 98 %

## 2022-12-19 DIAGNOSIS — F41.1 GAD (GENERALIZED ANXIETY DISORDER): ICD-10-CM

## 2022-12-19 DIAGNOSIS — N93.9 ABNORMAL UTERINE BLEEDING: Primary | ICD-10-CM

## 2022-12-19 DIAGNOSIS — F32.1 CURRENT MODERATE EPISODE OF MAJOR DEPRESSIVE DISORDER, UNSPECIFIED WHETHER RECURRENT (HCC): ICD-10-CM

## 2022-12-19 LAB
EXT PREGNANCY TEST URINE: NEGATIVE
EXT. CONTROL: NORMAL
GLUCOSE SERPL-MCNC: 144 MG/DL (ref 65–140)

## 2022-12-19 RX ORDER — KETAMINE HYDROCHLORIDE 100 MG/ML
INJECTION INTRAMUSCULAR; INTRAVENOUS AS NEEDED
Status: DISCONTINUED | OUTPATIENT
Start: 2022-12-19 | End: 2022-12-19

## 2022-12-19 RX ORDER — OXYCODONE HYDROCHLORIDE 5 MG/1
5 TABLET ORAL EVERY 4 HOURS PRN
Status: DISCONTINUED | OUTPATIENT
Start: 2022-12-19 | End: 2022-12-19 | Stop reason: HOSPADM

## 2022-12-19 RX ORDER — IBUPROFEN 600 MG/1
600 TABLET ORAL EVERY 6 HOURS PRN
Status: DISCONTINUED | OUTPATIENT
Start: 2022-12-19 | End: 2022-12-19 | Stop reason: HOSPADM

## 2022-12-19 RX ORDER — OXYCODONE HYDROCHLORIDE 5 MG/1
5 TABLET ORAL EVERY 6 HOURS PRN
Qty: 15 TABLET | Refills: 0 | Status: SHIPPED | OUTPATIENT
Start: 2022-12-19 | End: 2022-12-29

## 2022-12-19 RX ORDER — CEFAZOLIN SODIUM 1 G/50ML
1000 SOLUTION INTRAVENOUS ONCE
Status: DISCONTINUED | OUTPATIENT
Start: 2022-12-19 | End: 2022-12-19 | Stop reason: HOSPADM

## 2022-12-19 RX ORDER — GLYCOPYRROLATE 0.2 MG/ML
INJECTION INTRAMUSCULAR; INTRAVENOUS AS NEEDED
Status: DISCONTINUED | OUTPATIENT
Start: 2022-12-19 | End: 2022-12-19

## 2022-12-19 RX ORDER — GABAPENTIN 100 MG/1
100 CAPSULE ORAL ONCE
Status: COMPLETED | OUTPATIENT
Start: 2022-12-19 | End: 2022-12-19

## 2022-12-19 RX ORDER — SODIUM CHLORIDE, SODIUM LACTATE, POTASSIUM CHLORIDE, CALCIUM CHLORIDE 600; 310; 30; 20 MG/100ML; MG/100ML; MG/100ML; MG/100ML
125 INJECTION, SOLUTION INTRAVENOUS CONTINUOUS
Status: DISCONTINUED | OUTPATIENT
Start: 2022-12-19 | End: 2022-12-19 | Stop reason: HOSPADM

## 2022-12-19 RX ORDER — DEXAMETHASONE SODIUM PHOSPHATE 10 MG/ML
INJECTION, SOLUTION INTRAMUSCULAR; INTRAVENOUS AS NEEDED
Status: DISCONTINUED | OUTPATIENT
Start: 2022-12-19 | End: 2022-12-19

## 2022-12-19 RX ORDER — BACLOFEN 10 MG/1
10 TABLET ORAL 3 TIMES DAILY
Status: DISCONTINUED | OUTPATIENT
Start: 2022-12-19 | End: 2022-12-19 | Stop reason: HOSPADM

## 2022-12-19 RX ORDER — ONDANSETRON 2 MG/ML
4 INJECTION INTRAMUSCULAR; INTRAVENOUS ONCE AS NEEDED
Status: DISCONTINUED | OUTPATIENT
Start: 2022-12-19 | End: 2022-12-19 | Stop reason: HOSPADM

## 2022-12-19 RX ORDER — MIDAZOLAM HYDROCHLORIDE 2 MG/2ML
INJECTION, SOLUTION INTRAMUSCULAR; INTRAVENOUS AS NEEDED
Status: DISCONTINUED | OUTPATIENT
Start: 2022-12-19 | End: 2022-12-19

## 2022-12-19 RX ORDER — ONDANSETRON 2 MG/ML
INJECTION INTRAMUSCULAR; INTRAVENOUS AS NEEDED
Status: DISCONTINUED | OUTPATIENT
Start: 2022-12-19 | End: 2022-12-19

## 2022-12-19 RX ORDER — FENTANYL CITRATE/PF 50 MCG/ML
50 SYRINGE (ML) INJECTION
Status: DISCONTINUED | OUTPATIENT
Start: 2022-12-19 | End: 2022-12-19 | Stop reason: HOSPADM

## 2022-12-19 RX ORDER — PROPOFOL 10 MG/ML
INJECTION, EMULSION INTRAVENOUS AS NEEDED
Status: DISCONTINUED | OUTPATIENT
Start: 2022-12-19 | End: 2022-12-19

## 2022-12-19 RX ORDER — KETOROLAC TROMETHAMINE 30 MG/ML
INJECTION, SOLUTION INTRAMUSCULAR; INTRAVENOUS AS NEEDED
Status: DISCONTINUED | OUTPATIENT
Start: 2022-12-19 | End: 2022-12-19

## 2022-12-19 RX ORDER — HYDROMORPHONE HCL/PF 1 MG/ML
0.5 SYRINGE (ML) INJECTION
Status: DISCONTINUED | OUTPATIENT
Start: 2022-12-19 | End: 2022-12-19 | Stop reason: HOSPADM

## 2022-12-19 RX ORDER — BUPIVACAINE HYDROCHLORIDE 5 MG/ML
INJECTION, SOLUTION EPIDURAL; INTRACAUDAL AS NEEDED
Status: DISCONTINUED | OUTPATIENT
Start: 2022-12-19 | End: 2022-12-19 | Stop reason: HOSPADM

## 2022-12-19 RX ORDER — ONDANSETRON 2 MG/ML
4 INJECTION INTRAMUSCULAR; INTRAVENOUS EVERY 6 HOURS PRN
Status: DISCONTINUED | OUTPATIENT
Start: 2022-12-19 | End: 2022-12-19 | Stop reason: HOSPADM

## 2022-12-19 RX ORDER — HYDROMORPHONE HCL/PF 1 MG/ML
SYRINGE (ML) INJECTION AS NEEDED
Status: DISCONTINUED | OUTPATIENT
Start: 2022-12-19 | End: 2022-12-19

## 2022-12-19 RX ORDER — ROCURONIUM BROMIDE 10 MG/ML
INJECTION, SOLUTION INTRAVENOUS AS NEEDED
Status: DISCONTINUED | OUTPATIENT
Start: 2022-12-19 | End: 2022-12-19

## 2022-12-19 RX ORDER — FENTANYL CITRATE/PF 50 MCG/ML
25 SYRINGE (ML) INJECTION
Status: DISCONTINUED | OUTPATIENT
Start: 2022-12-19 | End: 2022-12-19 | Stop reason: HOSPADM

## 2022-12-19 RX ORDER — MAGNESIUM HYDROXIDE 1200 MG/15ML
LIQUID ORAL AS NEEDED
Status: DISCONTINUED | OUTPATIENT
Start: 2022-12-19 | End: 2022-12-19 | Stop reason: HOSPADM

## 2022-12-19 RX ORDER — HYDROMORPHONE HCL/PF 1 MG/ML
0.5 SYRINGE (ML) INJECTION
Status: COMPLETED | OUTPATIENT
Start: 2022-12-19 | End: 2022-12-19

## 2022-12-19 RX ORDER — PROMETHAZINE HYDROCHLORIDE 25 MG/ML
25 INJECTION, SOLUTION INTRAMUSCULAR; INTRAVENOUS ONCE AS NEEDED
Status: DISCONTINUED | OUTPATIENT
Start: 2022-12-19 | End: 2022-12-19 | Stop reason: HOSPADM

## 2022-12-19 RX ORDER — ACETAMINOPHEN 325 MG/1
975 TABLET ORAL ONCE
Status: COMPLETED | OUTPATIENT
Start: 2022-12-19 | End: 2022-12-19

## 2022-12-19 RX ORDER — CEFAZOLIN SODIUM 2 G/50ML
SOLUTION INTRAVENOUS AS NEEDED
Status: DISCONTINUED | OUTPATIENT
Start: 2022-12-19 | End: 2022-12-19

## 2022-12-19 RX ORDER — SODIUM CHLORIDE, SODIUM LACTATE, POTASSIUM CHLORIDE, CALCIUM CHLORIDE 600; 310; 30; 20 MG/100ML; MG/100ML; MG/100ML; MG/100ML
INJECTION, SOLUTION INTRAVENOUS CONTINUOUS PRN
Status: DISCONTINUED | OUTPATIENT
Start: 2022-12-19 | End: 2022-12-19

## 2022-12-19 RX ORDER — HYDROXYZINE 50 MG/1
25 TABLET, FILM COATED ORAL EVERY 6 HOURS PRN
Qty: 90 TABLET | Refills: 1 | Status: SHIPPED | OUTPATIENT
Start: 2022-12-19 | End: 2023-01-18

## 2022-12-19 RX ORDER — FENTANYL CITRATE 50 UG/ML
INJECTION, SOLUTION INTRAMUSCULAR; INTRAVENOUS AS NEEDED
Status: DISCONTINUED | OUTPATIENT
Start: 2022-12-19 | End: 2022-12-19

## 2022-12-19 RX ORDER — DIPHENHYDRAMINE HYDROCHLORIDE 50 MG/ML
12.5 INJECTION INTRAMUSCULAR; INTRAVENOUS ONCE AS NEEDED
Status: DISCONTINUED | OUTPATIENT
Start: 2022-12-19 | End: 2022-12-19 | Stop reason: HOSPADM

## 2022-12-19 RX ORDER — LIDOCAINE HYDROCHLORIDE 10 MG/ML
INJECTION, SOLUTION EPIDURAL; INFILTRATION; INTRACAUDAL; PERINEURAL AS NEEDED
Status: DISCONTINUED | OUTPATIENT
Start: 2022-12-19 | End: 2022-12-19

## 2022-12-19 RX ORDER — ACETAMINOPHEN 325 MG/1
975 TABLET ORAL EVERY 6 HOURS PRN
Status: DISCONTINUED | OUTPATIENT
Start: 2022-12-19 | End: 2022-12-19 | Stop reason: HOSPADM

## 2022-12-19 RX ADMIN — OXYCODONE HYDROCHLORIDE 5 MG: 5 TABLET ORAL at 12:42

## 2022-12-19 RX ADMIN — ONDANSETRON 4 MG: 2 INJECTION INTRAMUSCULAR; INTRAVENOUS at 09:37

## 2022-12-19 RX ADMIN — ROCURONIUM BROMIDE 30 MG: 50 INJECTION INTRAVENOUS at 08:20

## 2022-12-19 RX ADMIN — ACETAMINOPHEN 975 MG: 325 TABLET, FILM COATED ORAL at 06:30

## 2022-12-19 RX ADMIN — PROPOFOL 200 MG: 10 INJECTION, EMULSION INTRAVENOUS at 07:49

## 2022-12-19 RX ADMIN — FENTANYL CITRATE 50 MCG: 50 INJECTION INTRAMUSCULAR; INTRAVENOUS at 07:49

## 2022-12-19 RX ADMIN — CEFAZOLIN SODIUM 1000 MG: 2 SOLUTION INTRAVENOUS at 07:40

## 2022-12-19 RX ADMIN — HYDROMORPHONE HYDROCHLORIDE 0.3 MG: 1 INJECTION, SOLUTION INTRAMUSCULAR; INTRAVENOUS; SUBCUTANEOUS at 09:50

## 2022-12-19 RX ADMIN — Medication 25 MCG: at 10:17

## 2022-12-19 RX ADMIN — KETOROLAC TROMETHAMINE 30 MG: 30 INJECTION, SOLUTION INTRAMUSCULAR at 10:00

## 2022-12-19 RX ADMIN — MIDAZOLAM 2 MG: 1 INJECTION INTRAMUSCULAR; INTRAVENOUS at 07:40

## 2022-12-19 RX ADMIN — HYDROMORPHONE HYDROCHLORIDE 0.5 MG: 1 INJECTION, SOLUTION INTRAMUSCULAR; INTRAVENOUS; SUBCUTANEOUS at 10:34

## 2022-12-19 RX ADMIN — DEXAMETHASONE SODIUM PHOSPHATE 10 MG: 10 INJECTION, SOLUTION INTRAMUSCULAR; INTRAVENOUS at 07:53

## 2022-12-19 RX ADMIN — FENTANYL CITRATE 50 MCG: 50 INJECTION INTRAMUSCULAR; INTRAVENOUS at 07:50

## 2022-12-19 RX ADMIN — SODIUM CHLORIDE, SODIUM LACTATE, POTASSIUM CHLORIDE, AND CALCIUM CHLORIDE 125 ML/HR: .6; .31; .03; .02 INJECTION, SOLUTION INTRAVENOUS at 10:30

## 2022-12-19 RX ADMIN — KETAMINE HYDROCHLORIDE 20 MG: 100 INJECTION INTRAMUSCULAR; INTRAVENOUS at 07:53

## 2022-12-19 RX ADMIN — HYDROMORPHONE HYDROCHLORIDE 0.2 MG: 1 INJECTION, SOLUTION INTRAMUSCULAR; INTRAVENOUS; SUBCUTANEOUS at 08:11

## 2022-12-19 RX ADMIN — Medication 50 MCG: at 10:11

## 2022-12-19 RX ADMIN — GABAPENTIN 100 MG: 100 CAPSULE ORAL at 06:30

## 2022-12-19 RX ADMIN — SODIUM CHLORIDE, SODIUM LACTATE, POTASSIUM CHLORIDE, AND CALCIUM CHLORIDE: .6; .31; .03; .02 INJECTION, SOLUTION INTRAVENOUS at 07:40

## 2022-12-19 RX ADMIN — IBUPROFEN 600 MG: 600 TABLET ORAL at 14:18

## 2022-12-19 RX ADMIN — ROCURONIUM BROMIDE 50 MG: 50 INJECTION INTRAVENOUS at 07:50

## 2022-12-19 RX ADMIN — SODIUM CHLORIDE, SODIUM LACTATE, POTASSIUM CHLORIDE, CALCIUM CHLORIDE: 600; 310; 30; 20 INJECTION, SOLUTION INTRAVENOUS at 08:09

## 2022-12-19 RX ADMIN — LIDOCAINE HYDROCHLORIDE 50 MG: 10 INJECTION, SOLUTION EPIDURAL; INFILTRATION; INTRACAUDAL; PERINEURAL at 07:50

## 2022-12-19 RX ADMIN — GLYCOPYRROLATE 0.2 MCG: 0.2 INJECTION, SOLUTION INTRAMUSCULAR; INTRAVENOUS at 07:53

## 2022-12-19 RX ADMIN — HYDROMORPHONE HYDROCHLORIDE 0.5 MG: 1 INJECTION, SOLUTION INTRAMUSCULAR; INTRAVENOUS; SUBCUTANEOUS at 10:21

## 2022-12-19 RX ADMIN — SUGAMMADEX 200 MG: 100 INJECTION, SOLUTION INTRAVENOUS at 09:46

## 2022-12-19 NOTE — ANESTHESIA POSTPROCEDURE EVALUATION
Post-Op Assessment Note    CV Status:  Stable  Pain Score: 0    Pain management: inadequate     Mental Status:  Alert and awake   Hydration Status:  Euvolemic   PONV Controlled:  Controlled   Airway Patency:  Patent      Post Op Vitals Reviewed: Yes      Staff: Anesthesiologist, CRNA         No notable events documented      BP   98/47   Temp 98 4 °F (36 9 °C) (12/19/22 0959)    Pulse  89   Resp   18   SpO2   96

## 2022-12-19 NOTE — OP NOTE
OPERATIVE REPORT  PATIENT NAME: Saúl Tavares    :  1990  MRN: [id number]  Pt Location: [facility] OR ROOM 14    SURGERY DATE: 2022    Surgeon(s) and Role:     * Héctor Zhu MD - Primary     * Erasto Miller PA-C - Assisting     * Alejo Baker MD - Assisting    Preop Diagnosis:  Abnormal uterine bleeding [N93 9]    Post-Op Diagnosis Codes:     * Abnormal uterine bleeding [N93 9]    Procedure(s) (LRB):  HYSTERECTOMY LAPAROSCOPIC TOTAL ([address]) W/ ROBOTICS, BILATERAL SALPINGECTOMY (N/A)  CYSTOSCOPY, INSERTION URETERAL CATHETERS (N/A)    Specimen(s):  ID Type Source Tests Collected by Time Destination   1 : WITH B/L FALLOPIAN TUBES AND CERVIX Tissue Uterus TISSUE EXAM Héctor Zhu MD 2022 [id number]        Estimated Blood Loss:   Minimal    Drains:  [REMOVED] Urethral Catheter Non-latex;Straight-tip 16 Fr  (Removed)   Number of days: 0       Anesthesia Type:   General    Operative Indications:  Abnormal uterine bleeding [N809]  27-year-old para 4 with abnormal uterine bleeding, malpositioned IUD in situ, pelvic kidney, on therapeutic anticoagulation for gonadal vein thrombosis presents for definitive operative management  Operative Findings:  1  Exam under anesthesia revealed a gross normal cervix and mobile uterus  There are no palpable Nexa masses  2   On laparoscopy, there was no evidence of peritoneal disease  There was a 3 cm simple appearing cyst in the left ovary  The adnexa otherwise appeared grossly normal   The left pelvic kidney was overlying the common iliac vasculature  3   Cystoscopy revealed bilateral jets and no evidence of bladder injury  Complications:   None    Procedure and Technique:  After informed consent was obtained, the patient was taken to the operating room where general endotracheal anesthesia was administered without incident  She was then prepped and draped in the normal sterile fashion in the low dorsolithotomy position    Examination under anesthesia was then performed with findings noted as above  A speculum was placed in patient's vagina  The antilipid cervix grasped with single-tooth tenaculum  The uterus was sounded to 8 cm  The cervix was dilated Salomon dilators  A 0 Vicryl stay suture was placed on the cervix and used to secure the Kerri uterine manipulator and koh cup  Reeder catheter was inserted  Tension was then turned the abdomen  0 5% Marcaine was used to infiltrate the skin prior to placement of any trocar  The first insertion site was approximately 4 cm superior to the umbilicus in the midline  An 8 mm skin incision was made using an 11 blade scalpel  Through this was passed an 8 mm robotic trocar under direct visualization into the abdominal cavity  The abdomen is then insufflated to 15 mmHg using CO2 gas  Findings on laparoscopy noted as above  Additional trochars then placed under direct visualization  There were 2 8 mm robotic trochars placed on the left side of the abdomen 1 8 mm robotic trocar placed in the right side of the abdomen and a 5 mm assistant port was placed in the right upper quadrant  A total of 5 trocar sites were utilized  The robot was docked  Attention was first turned to the left side the pelvis  The round ligament was cauterized and transected  The retroperitoneal space was opened  The ureter could not be visualized  The perirenal fat was visible deep in the pelvis  A window was made in the broad ligament just below the infundibulopelvic ligament  The fallopian tube was elevated  The mesosalpinx was cauterized and transected with the vessel sealer  The utero-ovarian ligament was cauterized and transected using the vessel sealer  On the right side, the retroperitoneal space was opened  The ureter was identified coursing normally within the medial leaf of the broad ligament  The infundibulopelvic ligament was then skeletonized  The fallopian tube was then elevated    The mesosalpinx was cauterized and transected with the vessel sealer  The utero-ovarian ligament was then cauterized and transected  The vesicovaginal space was opened  The bladder was able be taken down below the level of the external os of the cervix  The uterine vessels were then skeletonized bilaterally  They were cauterized and transected with the vessel sealer  The cardinal ligaments were then cauterized and transected with the vessel sealer  A circumferential colpotomy was then performed using monopolar cautery  The uterus, cervix, bilateral fallopian tubes within removed transvaginally  2-0 STRATAFIX suture was then inserted transvaginally and used to close the vagina in a running fashion  Hemostasis was found to be adequate  The pelvis was irrigated  The pressure in the pelvis brought down to 5 mmHg  There was small amount of bleeding noted in the right parametria  This was controlled using a Floseal application and cautery  The pressure was then increased  The robot was undocked  The STRATAFIX suture was removed laparoscopically  The gas was removed from abdominal cavity  The ports removed under direct visualization  The skin was closed at all sites using 4-0 Monocryl followed by Exofin  The Reeder catheter was removed  A cystoscopy was performed in the usual fashion using a 30 degree cystoscope  Findings are noted as above  The bladder was then drained  The vagina was inspected  There is no evidence of active bleeding identified  The patient was then awakened and transferred to the recovery room in stable condition  Arnoldo counts correct x2 with procedure  No complications  Estimate blood loss is 75 mL     I was present for the entire procedure and A physician assistant was required during the procedure for retraction tissue handling,dissection and suturing    Patient Disposition:  PACU         SIGNATURE: Miya Velez MD  DATE: December 19, 2022  TIME: 9:56 AM

## 2022-12-19 NOTE — H&P
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 with Xarelto  She has a left pelvic kidney  Office endometrial biopsy 9/30/2022 was benign  No evidence of hyperplasia or malignancy  Her performance status is 0   1   Plan for robotic assisted total laparoscopic hysterectomy, bilateral salpingectomy, possible oophorectomy, possible exploratory laparotomy possible cystoscopy with ureteral catheter placement, all other indicated procedures  CHIEF COMPLAINT: Here for surgery          Patient ID: Saúl Tavares is a 28 y o  female  27-year-old presents for definitive operative management for abnormal uterine bleeding, malpositioned IUD  She has been on palliative Megace which has been helping  She has a left pelvic kidney  No interval change in medications or medical history since her last visit to the office  Review of Systems   Constitutional: Negative for activity change and unexpected weight change  HENT: Negative  Eyes: Negative  Respiratory: Negative  Cardiovascular: Negative  Gastrointestinal: Negative for abdominal distention and abdominal pain  Endocrine: Negative  Genitourinary: Negative for pelvic pain and vaginal bleeding  Musculoskeletal: Negative  Skin: Negative  Allergic/Immunologic: Negative  Neurological: Negative  Hematological: Negative  Psychiatric/Behavioral: Negative          Current Facility-Administered Medications   Medication Dose Route Frequency Provider Last Rate Last Admin   • baclofen tablet 10 mg  10 mg Oral TID Nataliia Blas MD       • ceFAZolin (ANCEF) IVPB (premix in dextrose) 1,000 mg 50 mL  1,000 mg Intravenous Once Héctor Zhu MD       • lactated ringers infusion  125 mL/hr Intravenous Continuous Héctor Zhu MD           Allergies   Allergen Reactions   • Metoclopramide Hives Past Medical History:   Diagnosis Date   • Abnormal Pap smear of cervix    • Anxiety    • Depression    • History of positive PPD    • Hyperlipidemia    • Menorrhagia    • Pelvic kidney     left side   • PTSD (post-traumatic stress disorder)    • Thrombosis of ovarian vein        Past Surgical History:   Procedure Laterality Date   • APPENDECTOMY     • [place] COLPOSCOPY,CERVIX W/ADJ VAG,W/LOOP BX N/A 2020    Procedure: BIOPSY CONE/COLD KNIFE CERVIX, ECC;  Surgeon: Singh Hull MD;  Location: [facility];  Service: Gynecology       OB History        4    Para   4    Term   3       1    AB        Living   4       SAB        IAB        Ectopic        Multiple   0    Live Births   4                 Family History   Problem Relation Age of Onset   • Crohn's disease Brother    • Crohn's disease Maternal Aunt    • Diabetes Maternal Grandmother    • Diabetes Maternal Grandfather    • Diabetes Paternal Grandmother    • Diabetes Paternal Grandfather    • COPD Mother    • Diabetes Father    • Hypertension Father    • Depression Sister        The following portions of the patient's history were reviewed and updated as appropriate: allergies, current medications, past family history, past medical history, past social history, past surgical history and problem list       Objective:    Blood pressure 95/65, pulse 93, temperature (!) 97 3 °F (36 3 °C), temperature source Tympanic, resp  rate 18, height 5' 6" (1 676 m), weight 76 2 kg (168 lb), SpO2 97 %, not currently breastfeeding  Body mass index is 27 12 kg/m²  Physical Exam  Vitals reviewed  Constitutional:       General: She is not in acute distress  Appearance: Normal appearance  She is normal weight  She is not ill-appearing, toxic-appearing or diaphoretic  HENT:      Head: Normocephalic and atraumatic  Mouth/Throat:      Mouth: Mucous membranes are moist    Eyes:      General: No scleral icterus  Right eye: No discharge  Left eye: No discharge  Conjunctiva/sclera: Conjunctivae normal    Cardiovascular:      Rate and Rhythm: Normal rate and regular rhythm  Heart sounds: Normal heart sounds  Pulmonary:      Effort: Pulmonary effort is normal  No respiratory distress  Breath sounds: Normal breath sounds  No stridor  No wheezing or rhonchi  Abdominal:      Palpations: Abdomen is soft  Musculoskeletal:      Right lower leg: No edema  Left lower leg: No edema  Skin:     General: Skin is warm and dry  Coloration: Skin is not jaundiced  Findings: No rash  Neurological:      General: No focal deficit present  Mental Status: She is alert and oriented to person, place, and time  Cranial Nerves: No cranial nerve deficit  Motor: No weakness  Gait: Gait normal    Psychiatric:         Mood and Affect: Mood normal          Behavior: Behavior normal          Thought Content:  Thought content normal          Judgment: Judgment normal            No results found for:   Lab Results   Component Value Date    WBC 7 54 12/07/2022    HGB 14 1 12/07/2022    HCT 42 3 12/07/2022    MCV 94 12/07/2022     12/07/2022     Lab Results   Component Value Date     (L) 04/10/2015    K 4 0 12/07/2022     (H) 12/07/2022    CO2 22 12/07/2022    ANIONGAP 9 04/10/2015    BUN 15 12/07/2022    CREATININE 0 73 12/07/2022    GLUCOSE 88 09/22/2016    GLUF 95 12/07/2022    CALCIUM 9 1 12/07/2022    AST 13 12/07/2022    ALT 40 12/07/2022    ALKPHOS 72 12/07/2022    PROT 6 0 (L) 04/10/2015    BILITOT 0 29 04/10/2015    EGFR 109 12/07/2022

## 2022-12-19 NOTE — ANESTHESIA PREPROCEDURE EVALUATION
Procedure:  HYSTERECTOMY LAPAROSCOPIC TOTAL ([address] [place] [address]) W/ ROBOTICS, BILATERAL SALPINGECTOMY (Abdomen)  CYSTOSCOPY, INSERTION URETERAL CATHETERS (Bladder)  LAPAROTOMY EXPLORATORY W/ ROBOTICS (Abdomen)  Per chart:  40-year-old para 4 w  abnormal uterine bleeding  She was admitted from August 2nd to 8/4/2022 with abdominal pain and found to have a right ovarian vein thrombosis extending into the IVC  She was started on therapeutic anticoagulation  Relevant Problems   /RENAL   (+) Pelvic kidney      NEURO/PSYCH   (+) RADHA (generalized anxiety disorder)   (+) Major depressive disorder   (+) PTSD (post-traumatic stress disorder)   (+) Tension headache      Recent labs personally reviewed:  Lab Results   Component Value Date    WBC 7 54 12/07/2022    HGB 14 1 12/07/2022     12/07/2022     Lab Results   Component Value Date     (L) 04/10/2015    K 4 0 12/07/2022    BUN 15 12/07/2022    CREATININE 0 73 12/07/2022    GLUCOSE 88 09/22/2016     Lab Results   Component Value Date    PTT 26 08/02/2022      Lab Results   Component Value Date    INR 0 97 08/02/2022       Lab Results   Component Value Date    HGBA1C 5 0 12/07/2022       Type and Screen:  O      Physical Exam    Airway    Mallampati score: II  TM Distance: >3 FB  Neck ROM: full     Dental   No notable dental hx     Cardiovascular      Pulmonary      Other Findings        Anesthesia Plan  ASA Score- 2     Anesthesia Type- general with ASA Monitors  Additional Monitors:   Airway Plan: ETT  Comment: Possible tap block if open discussed          Plan Factors-Exercise tolerance (METS): >4 METS  Chart reviewed  Existing labs reviewed  Patient summary reviewed  Patient is a current smoker  Patient instructed to abstain from smoking on day of procedure  Patient did not smoke on day of surgery  Induction- intravenous  Postoperative Plan- Plan for postoperative opioid use   Planned trial extubation    Informed Consent- Anesthetic plan and risks discussed with patient  I personally reviewed this patient with the CRNA  Discussed and agreed on the Anesthesia Plan with the CRNA  Chris Miller

## 2022-12-19 NOTE — DISCHARGE INSTR - AVS FIRST PAGE
Please call the office at [phone] with heavy vaginal bleeding, fevers, vomiting  Spotting from the vagina is normal   Please do not start Xarelto until Wednesday afternoon  You may take a shower Wednesday morning  No baths  No intercourse or anything in the vagina  No strenuous activity  [facility]  Colton Sebastian, [address] 1980, and Dc Aly  [phone]    Hysterectomy Discharge Instructions    WHAT YOU NEED TO KNOW:   A hysterectomy is surgery to remove your uterus  Your ovaries, fallopian tubes, cervix, or part of your vagina may also need to be removed  The organs and tissue that will be removed depends on your medical condition  After a hysterectomy, you will not be able to become pregnant  If your ovaries are removed, you will go through menopause if you have not already  DISCHARGE INSTRUCTIONS:   Contact your doctor at the number above if:   You have a fever over 101o  You have nausea or are vomiting that does not improve after a light meal    Your pain is getting worse, even after you take medicine  You feel pain or burning when you urinate, or you have trouble urinating  You have pus or a foul-smelling odor coming from your vagina  Your wound is red, swollen, or draining pus  You see new or an increased amount of bright red blood coming from your vagina or your incisions  You have questions or concerns about your condition or care  Seek care immediately:   Your arm or leg feels warm, tender, and painful  It may look swollen and red  You have increasing abdominal or pelvic pain  You have heavy vaginal bleeding that fills 1 or more sanitary pads in 1 hour  Call 911 for any of the following: You feel lightheaded, short of breath, and have chest pain  You cough up blood  Medicines: You may need any of the following:  Prescription medicine may be given   You may receive a prescription for pain medication or be advised to use over the counter (OTC) pain medication such as acetaminophen (Tylenol) or ibuprofen (Advil, Motrin)  Ask your healthcare provider how to take this medicine safely  NSAIDs , such as ibuprofen, help decrease swelling, pain, and fever  NSAIDs can cause stomach bleeding or kidney problems in certain people  If you take blood thinner medicine, always ask your healthcare provider if NSAIDs are safe for you  Always read the medicine label and follow directions  Stool softeners help treat or prevent constipation  Take your medicine as directed  Contact your healthcare provider if you think your medicine is not helping or if you have side effects  Tell him or her if you are allergic to any medicine  Keep a list of the medicines, vitamins, and herbs you take  Include the amounts, and when and why you take them  Bring the list or the pill bottles to follow-up visits  Carry your medicine list with you in case of an emergency  Activity:   Rest as needed  Get up and move around as directed to help prevent blood clots  Start with short walks and slowly increase the distance every day  Limit the number of times you climb stairs to 2 times each day for the first week  Plan most of your daily activities on one level of your home  Do not lift objects heavier than 10 pounds for 6 weeks  Avoid strenuous activity for 2 weeks  Do not strain during bowel movements  High-fiber foods and extra liquids can help you prevent constipation  Examples of high-fiber foods are fruit and bran  Prune juice and water are good liquids to drink  Do not have sex, use tampons, or douche for up to 8 weeks  You may shower as soon as the day after surgery  Tub baths can be taken starting 2 weeks after surgery  Do not go into pools or hot tubs until cleared by your doctor  Ask when it is safe for you to drive  It is generally safe to drive after 2 weeks and when no longer taking prescription pain medication      Ask when you may return to work and to other regular activities  Wound care: Care for your abdominal incisions as directed  Carefully wash around the wound with soap and water  If you have Hibiclens or medicated soap that you were instructed to use before surgery, you may use that to wash with for up to 2 days after surgery  If not, any mild non-scented, non-abrasive soap is safe  It is okay to let the soap and water run over your incision  Do not scrub your incision  Dry the area and put on new, clean bandages as directed  Change your bandages when they get wet or dirty  If you have strips of medical tape, let them fall off on their own  It may take 7 to 14 days for them to fall off  Check your incision every day for redness, swelling, or pus  Deep breathing: Take deep breaths and cough 10 times each hour  This will decrease your risk for a lung infection  Take a deep breath and hold it for as long as you can  Let the air out and then cough strongly  Deep breaths help open your airway  You may be given an incentive spirometer to help you take deep breaths  Put the plastic piece in your mouth and take a slow, deep breath, then let the air out and cough  Repeat these steps 10 times every hour  Get support: This surgery may be life-changing for you and your family  You will no longer be able to get pregnant  Sudden changes in the levels of your hormones may occur and cause mood swings and depression  You may feel angry, sad, or frightened, or cry frequently and unexpectedly  These feelings are normal  Talk to your healthcare provider about where you can get support  You can also ask if hormone replacement medicine is right for you  Follow up with your healthcare provider or gynecologist as directed: You may need to return to have stitches removed, and for other tests  Write down your questions so you remember to ask them during your visits        © 2017 [employer] Information is for End User's use only and may not be sold, redistributed or otherwise used for commercial purposes  All illustrations and images included in CareNotes® are the copyrighted property of [employer]  or Kit Camacho  The above information is an  only  It is not intended as medical advice for individual conditions or treatments  Talk to your doctor, nurse or pharmacist before following any medical regimen to see if it is safe and effective for you

## 2022-12-23 ENCOUNTER — OFFICE VISIT (OUTPATIENT)
Dept: SLEEP CENTER | Facility: CLINIC | Age: 32
End: 2022-12-23

## 2022-12-23 VITALS
HEART RATE: 89 BPM | BODY MASS INDEX: 27.74 KG/M2 | DIASTOLIC BLOOD PRESSURE: 60 MMHG | WEIGHT: 172.6 LBS | HEIGHT: 66 IN | OXYGEN SATURATION: 98 % | SYSTOLIC BLOOD PRESSURE: 100 MMHG

## 2022-12-23 DIAGNOSIS — E66.3 OVERWEIGHT (BMI 25.0-29.9): ICD-10-CM

## 2022-12-23 DIAGNOSIS — R51.9 HEADACHE UPON AWAKENING: ICD-10-CM

## 2022-12-23 DIAGNOSIS — G47.33 OBSTRUCTIVE SLEEP APNEA-HYPOPNEA SYNDROME: Primary | ICD-10-CM

## 2022-12-23 DIAGNOSIS — G47.19 EXCESSIVE DAYTIME SLEEPINESS: ICD-10-CM

## 2022-12-23 DIAGNOSIS — G47.8 NON-RESTORATIVE SLEEP: ICD-10-CM

## 2022-12-23 DIAGNOSIS — F51.02 ADJUSTMENT INSOMNIA: ICD-10-CM

## 2022-12-23 NOTE — PROGRESS NOTES
Consultation - [address]  Abhishek Wiggins, 1990, MRN: [id number]    12/23/2022        Reason for Consult / Principal Problem:    Evaluation of possible Obstructive Sleep Apnea  Headaches upon awakening  Insomnia - sleep initiation and sleep maintenance  Excessive daytime sleepiness       Thank you for the opportunity of participating in the evaluation and care of this patient in the Sleep Clinic at [facility]  Subjective:     HPI: Abhishek Wiggins is a 28y o  year old female  She presents for a consultation regarding difficulty sleeping  She states that she has always had a difficult time falling asleep  Her sister passed away in January and this has worsened sleep initiation insomnia  She is following with psychiatry and is taking bupropion in the am and zoloft at bedtime for depression and takes seroquel 25mg at bedtime for insomnia  Despite use of medication, she wakes up 4-5 times per night for a variety of reasons  She has discomfort in her legs that wakes her from sleep  She reports that symptoms typically present in her left thigh  She feels a squeezing pain in her thigh  Movement of her leg helps relieve the pain  Her boyfriend tells her she kicks her legs constantly during sleep  She feels leg movements wake her at times  She had a partial hysterectomy on 12/19/22      Comorbid conditions:  Overweight  Depression  Anxiety    Review of Systems      Genitourinary none   Cardiology none   Gastrointestinal none   Neurology frequent headaches, awaken with headache and need to move extremities   Constitutional fatigue   Integumentary none   Psychiatry anxiety, depression, aggressiveness or irritability and mood change   Musculoskeletal legs twitching/jerking and leg cramps   Pulmonary none   ENT none   Endocrine excessive thirst   Hematological none       Sleep Study Results:  No prior sleep study    Employment: She is a medical assistant at [employer]  She works M-F between the hours of 7:30am-4:00pm   She is currently on a medical leave S/P hysterectomy    Sleep Schedule:       Bedtime:  She gets into bed around 7:30pm   She reads to relax      Latency:  3-4 hours      Wakeup time:  6:00-6:30am, daily - awakens without the use of an alarm    Awakenings:       Frequency:  4-5 times per night      Causes:  Nightmares, leg pain, for urination, feels hungry      Duration:  2 hours    Daytime Sleepiness / Inappropriate Sleep:       Most severe:  Around noon       Naps :  She does not take naps      Inappropriate drowsiness / sleep:  She struggles to stay awake and can briefly doze while doing computer work  She dozes while riding in a car  She does not feel sleepy or doze while driving  She is able to maintain wakefulness while watching TV  Snoring:  No reports of snoring    Apnea:  No reports of witnessed apnea    Change in Weight:  Weight fluctuates widely over the past 3 years    Restless Leg Syndrome:  She has clinical symptoms consistent with this diagnosis including pain in her thighs, mainly left  There are also reports of frequent kicking  Other Complaints:  She talks in her sleep at times  She walked in her sleep as a child, no symptoms in adulthood  No reports of sleep paralysis  She has had hypnopompic hallucinations of shadows and her sister (recently )  She awakens with headaches frequently during the night and in the morning  She reports bruxism  Social History:      Caffeine:  One cup of coffee daily       Tobacco:   reports that she has been smoking cigarettes  She has never used smokeless tobacco      E-cig/Vaping:    E-Cigarette/Vaping   • E-Cigarette Use Never User       E-Cigarette/Vaping Substances   • Nicotine No    • THC No    • CBD No    • Flavoring No    • Other No    • Unknown No          Alcohol:   reports current alcohol use   socially      Drugs:   reports no history of drug use        The review of systems and following portions of the patient's history were reviewed and updated as appropriate: allergies, current medications, past family history, past medical history, past social history, past surgical history and problem list         Objective:       Vitals:    12/23/22 0745   BP: 100/60   Pulse: 89   SpO2: 98%   Weight: 78 3 kg (172 lb 9 6 oz)   Height: 5' 6" (1 676 m)     Body mass index is 27 86 kg/m²  Neck Circumference: 13 5  [place] Sleepiness Scale:  Total score: 12      Current Outpatient Medications:   •  buPROPion (WELLBUTRIN SR) 100 mg 12 hr tablet, Take 100 mg by mouth daily after breakfast, Disp: , Rfl:   •  hydrOXYzine HCL (ATARAX) 50 mg tablet, TAKE 0 5 TABLETS (25 MG TOTAL) BY MOUTH EVERY 6 (SIX) HOURS AS NEEDED FOR ANXIETY, Disp: 90 tablet, Rfl: 1  •  ondansetron (ZOFRAN-ODT) 4 mg disintegrating tablet, TAKE 1 TABLET BY MOUTH EVERY 8 HOURS AS NEEDED FOR NAUSEA AND VOMITING, Disp: , Rfl:   •  oxyCODONE (ROXICODONE) 5 immediate release tablet, Take 1 tablet (5 mg total) by mouth every 6 (six) hours as needed for severe pain for up to 10 days Max Daily Amount: 20 mg, Disp: 15 tablet, Rfl: 0  •  QUEtiapine (SEROquel) 25 mg tablet, Take 25 mg by mouth daily at bedtime, Disp: , Rfl:   •  sertraline (ZOLOFT) 100 mg tablet, TAKE 2 TABLETS (200 MG TOTAL) BY MOUTH DAILY AT BEDTIME, Disp: 90 tablet, Rfl: 2  •  rivaroxaban (Xarelto) 20 mg tablet, Take 1 tablet (20 mg total) by mouth daily with breakfast, Disp: 30 tablet, Rfl: 2    Physical Exam  General Appearance:   Alert, cooperative, no distress, appears stated age, overweight     Head:   Normocephalic, without obvious abnormality, atraumatic     Eyes:   Conjunctiva/corneas clear          Nose:  Nares normal, septum midline, mucosa normal, no drainage or sinus tenderness           Throat:  Lips, teeth and gums normal; tongue normal in size and midline in position; mucosa moist and redundant bilaterally, uvula normal, tonsils normal, Mallampati class 3       Neck:  Supple, symmetrical, trachea midline, no adenopathy; no thyromegaly noted, no carotid bruit or JVD     Lungs:      Clear to auscultation bilaterally, respirations unlabored     Heart:   Regular rate and rhythm, S1 and S2 normal, no murmur, rub or gallop       Extremities:  Extremities normal, atraumatic, no cyanosis or edema       Skin:  Skin color, texture, turgor normal, no rashes or lesions       Neurologic:  No focal deficits noted  ASSESSMENT / PLAN     1  Obstructive sleep apnea-hypopnea syndrome  Diagnostic Sleep Study      2  Adjustment insomnia  Ambulatory Referral to Sleep Medicine    Diagnostic Sleep Study      3  Overweight (BMI 25 0-29  9)  Diagnostic Sleep Study      4  Headache upon awakening  Diagnostic Sleep Study      5  Excessive daytime sleepiness        6  Non-restorative sleep              Counseling / Coordination of Care  Total clinic time spent today 55 minutes  Greater than 50% of total time was spent with the patient and / or family counseling and / or coordination of care  A description of the counseling / coordination of care:     diagnostic results, instructions for management, risk factor reductions, prognosis, patient and family education, impressions, risks and benefits of treatment options and importance of compliance with treatment    Today we discussed the anatomy and physiology of the upper airway  I pointed out how changes in this region can result in both snoring and abnormal breathing events including apneas and hypopneas  I explained the most common co-morbidities of untreated sleep apnea  After this we talked about some forms of treatment including application of positive airway pressure, mandibular advancement devices and surgery       In order to evaluate the possibility of Obstructive Sleep Apnea as a cause of the patient's symptoms, a home sleep study will be completed to identify the presence or absence of abnormal nocturnal breathing  If significant abnormal nocturnal breathing is detected, nasal CPAP will be titrated to find the optimum pressure needed to maintain upper airway patency during sleep  This may be accomplished using APAP or may require an in lab titration study  We discussed changing the dosing time of sertraline, as it can cause insomnia in some people  We discussed avoiding getting into bed so early  She is likely having perception of insomnia since wake up time is consistently 6:00am   Sleep hygiene discussed in detail  Following testing, the patient will return to the Sleep [address] [place] [address] for a review of the test results and to discuss possible treatment options  The following instructions have been given to the patient today:    Patient Instructions     1  Recommend changing the dosing time of zoloft to the morning  It can cause insomnia and sleep disruptions in some people  2  Schedule diagnostic sleep study  3  Avoid getting into bed early  Try to stay awake until 9:30-10:00pm   You can take seroquel around that time and you will likely fall asleep more easily  4  Schedule appointment for review of study results and plan of treatment  5  Continue working with psychiatry regarding anxiety/depression  6  You may consider reading "No More Sleepless Nights" by Dr Bella Garica and Dr Norberto Griffin, "Say Good Night to Insomnia" by Dr Patrick Baldwin, "[address] and Get to Sleep" by Migdalia Michaud and Brenda Victoria  These books can help you to fall asleep without the use of medications over time  Tips for Healthy Sleep   7  AMBULATORY CARE:   8  What you need to know about healthy sleep:  Healthy sleep, or sleep hygiene, is important to your physical, mental, and emotional health  Sleep affects almost every part of your body, including your brain, heart, metabolism, immune system, and mood  Good sleep habits can help you fall asleep and stay asleep during the night   Poor quality sleep or a long-term lack of sleep increases your risk for certain disorders  These include high blood pressure, cardiovascular disease, obesity, depression, and diabetes  9  What you need to know about sleep stages: The 2 main kinds of sleep are rapid eye movement (REM) and non-REM  You cycle through REM and non-REM many times during the night  · Stage 1 non-REM sleep  is when you first fall asleep  This stage is short  Your brain waves slow and your body relaxes  · Stage 2 non-REM sleep  is a period of light sleep before you move into deeper sleep  You spend the most time in this stage during the night  Your body temperature drops and your body relaxes even more  · Stage 3 non-REM sleep  is a period of deep sleep that starts after stage 2  This stage is needed to feel refreshed in the morning  · REM sleep  starts about 90 minutes after you fall asleep  Your eyes move from side to side  Your heart rate, blood pressure, and breathing become faster  Most of your dreams occur during REM sleep  As you age, you spend less time in REM sleep  10    11  How much sleep you need:  Each person needs a different amount of sleep  Your sleep patterns and needs change as you age  In general, school-aged children and teenagers need about 9 to 10 hours of sleep a night  Most adults need about 7 to 9 hours of sleep a night  After age 61 years, sleep may be lighter and for less time, with more periods of wakefulness  Older adults may fall asleep and wake up earlier than they did at a younger age  Medicines or conditions, such as chronic pain, may keep older adults awake  12  How to know you are getting healthy sleep:   · Keep a 2-week log of your sleep  Write down what time you got up, what you did that day, and anything else that could affect your sleep  Keep a record of your sleep patterns, and any sleeping problems you have  Bring the record to your follow-up visits       · Ask someone who lives with you if they notice anything about your sleep  For example, maybe you snore loudly or stop breathing for short periods  Tell your healthcare provider if your legs twitch or you feel like you can't keep them still  Your healthcare provider may refer to you a sleep specialist or cognitive behavioral therapy  13    14  Call your doctor if:   · Someone has told you that you stop breathing when you sleep  · Your legs twitch and it keeps you from sleeping  · You begin to use drugs or alcohol to fall asleep  · You have questions or concerns about your sleep habits  15    16  How to improve your sleep:  Your daily routines influence your sleep  Nutrition, medicines, your schedule, and what you do in the evenings can affect your sleep  Do the following to help improve your sleep:  · Create a sleep schedule  Go to sleep and wake up at the same time every day  Be consistent, even on weekends or when you travel  Do not go to bed unless you are sleepy  · Set up a calming routine before bed  Soak in a warm bath, listen to relaxing music, or read a book  · Turn off all electronics at least 30 minutes before bedtime  Try not to watch television or use any electronics in the bedroom  · Limit naps to 20 or 30 minutes, earlier in the day  Naps could make it hard for you to fall asleep at bedtime  · Keep your bedroom cool, quiet, and dark  Turn on white noise, such as a fan, to help you relax  · Get up if you do not fall asleep within 20 minutes  Move to another room and do something relaxing until you become sleepy  · Limit caffeine, alcohol, and food to earlier in the day  Only drink caffeine in the morning  Do not drink alcohol within 6 hours of bedtime  Do not eat a heavy meal right before you go to bed  Limit how much liquid you drink in the evenings and right before bed  If you are prescribed diuretics, or water pills, take them early in the day  · Exercise regularly    Daily exercise may help you sleep better  Do not exercise within 3 hours of bedtime  16     18    19  Follow up with your doctor as directed:  Bring your sleep log with you  Write down your questions so you remember to ask them during your visits  [place] 2022 Information is for End User's use only and may not be sold, redistributed or otherwise used for commercial purposes  All illustrations and images included in CareNotes® are the copyrighted property of A D A M , Inc  or [facility] [employer]   21  The above information is an  only  It is not intended as medical advice for individual conditions or treatments  Talk to your doctor, nurse or pharmacist before following any medical regimen to see if it is safe and effective for you  22          Nursing Support:  When: Monday through Friday 7A-5PM except holidays  Where: Our direct line is [phone]  If you are having a true emergency please call 911  In the event that the line is busy or it is after hours please leave a voice message and we will return your call  Please speak clearly, leaving your full name, birth date, best number to reach you and the reason for your call  Medication refills: We will need the name of the medication, the dosage, the ordering provider, whether you get a 30 or 90 day refill, and the pharmacy name and address  Medications will be ordered by the provider only  Nurses cannot call in prescriptions  Please allow 7 days for medication refills  Physician requested updates: If your provider requested that you call with an update after starting medication, please be ready to provide us the medication and dosage, what time you take your medication, the time you attempt to fall asleep, time you fall asleep, when you wake up, and what time you get out of bed  Sleep Study Results: We will contact you with sleep study results and/or next steps after the physician has reviewed your testing        Franci Crenshaw, [address] [facility]      Portions of the record may have been created with voice recognition software  Occasional wrong word or "sound a like" substitutions may have occurred due to the inherent limitations of voice recognition software  Read the chart carefully and recognize, using context, where substitutions have occurred

## 2022-12-23 NOTE — PATIENT INSTRUCTIONS
Recommend changing the dosing time of zoloft to the morning  It can cause insomnia and sleep disruptions in some people  Schedule diagnostic sleep study  Avoid getting into bed early  Try to stay awake until 9:30-10:00pm   You can take seroquel around that time and you will likely fall asleep more easily  Schedule appointment for review of study results and plan of treatment  Continue working with psychiatry regarding anxiety/depression  You may consider reading "No More Sleepless Nights" by Dr Latesha Mccain and Dr Antonio Wells, "Say Good Night to Insomnia" by Dr Shanda Laguna, "[address] and Get to Sleep" by Melita Fischer and Carole Newsome  These books can help you to fall asleep without the use of medications over time  Tips for Healthy Sleep   AMBULATORY CARE:   What you need to know about healthy sleep:  Healthy sleep, or sleep hygiene, is important to your physical, mental, and emotional health  Sleep affects almost every part of your body, including your brain, heart, metabolism, immune system, and mood  Good sleep habits can help you fall asleep and stay asleep during the night  Poor quality sleep or a long-term lack of sleep increases your risk for certain disorders  These include high blood pressure, cardiovascular disease, obesity, depression, and diabetes  What you need to know about sleep stages: The 2 main kinds of sleep are rapid eye movement (REM) and non-REM  You cycle through REM and non-REM many times during the night  Stage 1 non-REM sleep  is when you first fall asleep  This stage is short  Your brain waves slow and your body relaxes  Stage 2 non-REM sleep  is a period of light sleep before you move into deeper sleep  You spend the most time in this stage during the night  Your body temperature drops and your body relaxes even more  Stage 3 non-REM sleep  is a period of deep sleep that starts after stage 2  This stage is needed to feel refreshed in the morning       REM sleep starts about 90 minutes after you fall asleep  Your eyes move from side to side  Your heart rate, blood pressure, and breathing become faster  Most of your dreams occur during REM sleep  As you age, you spend less time in REM sleep  How much sleep you need:  Each person needs a different amount of sleep  Your sleep patterns and needs change as you age  In general, school-aged children and teenagers need about 9 to 10 hours of sleep a night  Most adults need about 7 to 9 hours of sleep a night  After age 61 years, sleep may be lighter and for less time, with more periods of wakefulness  Older adults may fall asleep and wake up earlier than they did at a younger age  Medicines or conditions, such as chronic pain, may keep older adults awake  How to know you are getting healthy sleep:   Keep a 2-week log of your sleep  Write down what time you got up, what you did that day, and anything else that could affect your sleep  Keep a record of your sleep patterns, and any sleeping problems you have  Bring the record to your follow-up visits  Ask someone who lives with you if they notice anything about your sleep  For example, maybe you snore loudly or stop breathing for short periods  Tell your healthcare provider if your legs twitch or you feel like you can't keep them still  Your healthcare provider may refer to you a sleep specialist or cognitive behavioral therapy  Call your doctor if:   Someone has told you that you stop breathing when you sleep  Your legs twitch and it keeps you from sleeping  You begin to use drugs or alcohol to fall asleep  You have questions or concerns about your sleep habits  How to improve your sleep:  Your daily routines influence your sleep  Nutrition, medicines, your schedule, and what you do in the evenings can affect your sleep  Do the following to help improve your sleep:  Create a sleep schedule  Go to sleep and wake up at the same time every day   Be consistent, even on weekends or when you travel  Do not go to bed unless you are sleepy  Set up a calming routine before bed  Soak in a warm bath, listen to relaxing music, or read a book  Turn off all electronics at least 30 minutes before bedtime  Try not to watch television or use any electronics in the bedroom  Limit naps to 20 or 30 minutes, earlier in the day  Naps could make it hard for you to fall asleep at bedtime  Keep your bedroom cool, quiet, and dark  Turn on white noise, such as a fan, to help you relax  Get up if you do not fall asleep within 20 minutes  Move to another room and do something relaxing until you become sleepy  Limit caffeine, alcohol, and food to earlier in the day  Only drink caffeine in the morning  Do not drink alcohol within 6 hours of bedtime  Do not eat a heavy meal right before you go to bed  Limit how much liquid you drink in the evenings and right before bed  If you are prescribed diuretics, or water pills, take them early in the day  Exercise regularly  Daily exercise may help you sleep better  Do not exercise within 3 hours of bedtime  Follow up with your doctor as directed:  Bring your sleep log with you  Write down your questions so you remember to ask them during your visits  © [employer] 2022 Information is for End User's use only and may not be sold, redistributed or otherwise used for commercial purposes  All illustrations and images included in CareNotes® are the copyrighted property of A D A M , Inc  or [employer]idel Chairez   The above information is an  only  It is not intended as medical advice for individual conditions or treatments  Talk to your doctor, nurse or pharmacist before following any medical regimen to see if it is safe and effective for you  Nursing Support:  When: Monday through Friday 7A-5PM except holidays  Where: Our direct line is [phone]      If you are having a true emergency please call 911  In the event that the line is busy or it is after hours please leave a voice message and we will return your call  Please speak clearly, leaving your full name, birth date, best number to reach you and the reason for your call  Medication refills: We will need the name of the medication, the dosage, the ordering provider, whether you get a 30 or 90 day refill, and the pharmacy name and address  Medications will be ordered by the provider only  Nurses cannot call in prescriptions  Please allow 7 days for medication refills  Physician requested updates: If your provider requested that you call with an update after starting medication, please be ready to provide us the medication and dosage, what time you take your medication, the time you attempt to fall asleep, time you fall asleep, when you wake up, and what time you get out of bed  Sleep Study Results: We will contact you with sleep study results and/or next steps after the physician has reviewed your testing

## 2023-01-03 ENCOUNTER — TELEPHONE (OUTPATIENT)
Dept: HEMATOLOGY ONCOLOGY | Facility: CLINIC | Age: 33
End: 2023-01-03

## 2023-01-03 NOTE — TELEPHONE ENCOUNTER
Spoke to patient's mom to reschedule patient's appt to a virtual appt  Per mom, Dann Cables will call back to reschedule  Direct number left for patient to call back

## 2023-01-04 ENCOUNTER — TELEPHONE (OUTPATIENT)
Dept: HEMATOLOGY ONCOLOGY | Facility: HOSPITAL | Age: 33
End: 2023-01-04

## 2023-01-09 ENCOUNTER — OFFICE VISIT (OUTPATIENT)
Dept: GYNECOLOGIC ONCOLOGY | Facility: CLINIC | Age: 33
End: 2023-01-09

## 2023-01-09 VITALS
DIASTOLIC BLOOD PRESSURE: 68 MMHG | HEIGHT: 66 IN | BODY MASS INDEX: 27.72 KG/M2 | SYSTOLIC BLOOD PRESSURE: 104 MMHG | TEMPERATURE: 98.2 F | WEIGHT: 172.5 LBS

## 2023-01-09 DIAGNOSIS — R30.0 DYSURIA: Primary | ICD-10-CM

## 2023-01-09 DIAGNOSIS — N93.9 ABNORMAL UTERINE BLEEDING: ICD-10-CM

## 2023-01-09 NOTE — PROGRESS NOTES
Assessment/Plan:    Problem List Items Addressed This Visit        Genitourinary    Abnormal uterine bleeding     51-year-old status post robotic assisted total laparoscopic hysterectomy, bilateral salpingectomy, cystoscopy for abnormal uterine bleeding  She has a left pelvic kidney  Final pathology from surgery was benign  There is no evidence of hyperplasia, dysplasia, malignancy  She is recovering well from surgery  She has dysuria  Her performance status is 0   1   Urinalysis with reflex culture  She understands that due to pelvic inflammation secondary to surgery, dysuria is common  2   We reviewed ongoing activity limitations  3   Return in 3 weeks for postoperative pelvic examination  Other    Dysuria - Primary    Relevant Orders    UA w Reflex to Microscopic w Reflex to Culture         CHIEF COMPLAINT: Postoperative evaluation             Patient ID: Davina Rodriguez is a 28 y o  female  Returns for postoperative evaluation  He does not have any vaginal bleeding  Her pain is controlled without narcotics  She has occasional abdominal discomfort around the incisions  She also has pain with urination without urinary frequency  She has been afebrile  No other interval change in medications or medical history since her surgery        The following portions of the patient's history were reviewed and updated as appropriate: allergies, current medications, past family history, past medical history, past social history, past surgical history and problem list     Review of Systems      Current Outpatient Medications:   •  buPROPion (WELLBUTRIN SR) 100 mg 12 hr tablet, Take 100 mg by mouth daily after breakfast, Disp: , Rfl:   •  hydrOXYzine HCL (ATARAX) 50 mg tablet, TAKE 0 5 TABLETS (25 MG TOTAL) BY MOUTH EVERY 6 (SIX) HOURS AS NEEDED FOR ANXIETY, Disp: 90 tablet, Rfl: 1  •  ondansetron (ZOFRAN-ODT) 4 mg disintegrating tablet, TAKE 1 TABLET BY MOUTH EVERY 8 HOURS AS NEEDED FOR NAUSEA AND VOMITING, Disp: , Rfl:   •  QUEtiapine (SEROquel) 25 mg tablet, Take 25 mg by mouth daily at bedtime, Disp: , Rfl:   •  rivaroxaban (Xarelto) 20 mg tablet, Take 1 tablet (20 mg total) by mouth daily with breakfast, Disp: 30 tablet, Rfl: 2  •  sertraline (ZOLOFT) 100 mg tablet, TAKE 2 TABLETS (200 MG TOTAL) BY MOUTH DAILY AT BEDTIME, Disp: 90 tablet, Rfl: 2    Objective:    Blood pressure 104/68, temperature 98 2 °F (36 8 °C), temperature source Tympanic, height 5' 6" (1 676 m), weight 78 2 kg (172 lb 8 oz), not currently breastfeeding  Body mass index is 27 84 kg/m²  Body surface area is 1 88 meters squared  Physical Exam  Vitals reviewed  Constitutional:       General: She is not in acute distress  Appearance: Normal appearance  HENT:      Head: Normocephalic and atraumatic  Mouth/Throat:      Mouth: Mucous membranes are moist    Pulmonary:      Effort: Pulmonary effort is normal    Abdominal:      Palpations: Abdomen is soft  There is no mass  Tenderness: There is no abdominal tenderness  Skin:     General: Skin is warm and dry  Comments: Surgical trocar sites are intact, clean and dry without induration, erythema or purulent drainage  Neurological:      Mental Status: She is alert and oriented to person, place, and time  Psychiatric:         Mood and Affect: Mood normal          Behavior: Behavior normal          Thought Content:  Thought content normal          Judgment: Judgment normal

## 2023-01-09 NOTE — LETTER
January 9, 2023     Patient: Yousuf Powers  YOB: 1990  Date of Visit: 1/9/2023      To Whom it May Concern: Carol Deleon is under my professional care  Sindi Jain was seen in my office on 1/9/2023  Sindi Jain may return to work on 1/09/2023 and may return to work with limitations no heavy lifting, more than 8-10lbs until 1/31  No restrictions after 02/1/2023       If you have any questions or concerns, please don't hesitate to call           Sincerely,          Juice Hdz MD        CC: No Recipients

## 2023-01-09 NOTE — LETTER
2023     Marcus Coleman, [address] [facility] [address] [address]    Patient: Roy Apgar   YOB: 1990   Date of Visit: 2023       Dear Dr Denisa Wiggins:    Thank you for referring Maribel Hughes to me for evaluation  Below are my notes for this consultation  If you have questions, please do not hesitate to call me  I look forward to following your patient along with you  Sincerely,        Zeus Ríos MD        CC: MD Zeus Murillo MD  2023 11:56 AM  Sign when Signing Visit  Assessment/Plan:    Problem List Items Addressed This Visit        Genitourinary    Abnormal uterine bleeding     26-year-old status post robotic assisted total laparoscopic hysterectomy, bilateral salpingectomy, cystoscopy for abnormal uterine bleeding  She has a left pelvic kidney  Final pathology from surgery was benign  There is no evidence of hyperplasia, dysplasia, malignancy  She is recovering well from surgery  She has dysuria  Her performance status is 0   1   Urinalysis with reflex culture  She understands that due to pelvic inflammation secondary to surgery, dysuria is common  2   We reviewed ongoing activity limitations  3   Return in 3 weeks for postoperative pelvic examination  Other    Dysuria - Primary    Relevant Orders    UA w Reflex to Microscopic w Reflex to Culture         CHIEF COMPLAINT: Postoperative evaluation             Patient ID: Roy Apgar is a 28 y o  female  Returns for postoperative evaluation  He does not have any vaginal bleeding  Her pain is controlled without narcotics  She has occasional abdominal discomfort around the incisions  She also has pain with urination without urinary frequency  She has been afebrile  No other interval change in medications or medical history since her surgery        The following portions of the patient's history were reviewed and updated as appropriate: allergies, current medications, past family history, past medical history, past social history, past surgical history and problem list     Review of Systems      Current Outpatient Medications:   •  buPROPion (WELLBUTRIN SR) 100 mg 12 hr tablet, Take 100 mg by mouth daily after breakfast, Disp: , Rfl:   •  hydrOXYzine HCL (ATARAX) 50 mg tablet, TAKE 0 5 TABLETS (25 MG TOTAL) BY MOUTH EVERY 6 (SIX) HOURS AS NEEDED FOR ANXIETY, Disp: 90 tablet, Rfl: 1  •  ondansetron (ZOFRAN-ODT) 4 mg disintegrating tablet, TAKE 1 TABLET BY MOUTH EVERY 8 HOURS AS NEEDED FOR NAUSEA AND VOMITING, Disp: , Rfl:   •  QUEtiapine (SEROquel) 25 mg tablet, Take 25 mg by mouth daily at bedtime, Disp: , Rfl:   •  rivaroxaban (Xarelto) 20 mg tablet, Take 1 tablet (20 mg total) by mouth daily with breakfast, Disp: 30 tablet, Rfl: 2  •  sertraline (ZOLOFT) 100 mg tablet, TAKE 2 TABLETS (200 MG TOTAL) BY MOUTH DAILY AT BEDTIME, Disp: 90 tablet, Rfl: 2    Objective:    Blood pressure 104/68, temperature 98 2 °F (36 8 °C), temperature source Tympanic, height 5' 6" (1 676 m), weight 78 2 kg (172 lb 8 oz), not currently breastfeeding  Body mass index is 27 84 kg/m²  Body surface area is 1 88 meters squared  Physical Exam  Vitals reviewed  Constitutional:       General: She is not in acute distress  Appearance: Normal appearance  HENT:      Head: Normocephalic and atraumatic  Mouth/Throat:      Mouth: Mucous membranes are moist    Pulmonary:      Effort: Pulmonary effort is normal    Abdominal:      Palpations: Abdomen is soft  There is no mass  Tenderness: There is no abdominal tenderness  Skin:     General: Skin is warm and dry  Comments: Surgical trocar sites are intact, clean and dry without induration, erythema or purulent drainage  Neurological:      Mental Status: She is alert and oriented to person, place, and time     Psychiatric:         Mood and Affect: Mood normal          Behavior: Behavior normal  Thought Content:  Thought content normal          Judgment: Judgment normal

## 2023-01-09 NOTE — ASSESSMENT & PLAN NOTE
66-year-old status post robotic assisted total laparoscopic hysterectomy, bilateral salpingectomy, cystoscopy for abnormal uterine bleeding  She has a left pelvic kidney  Final pathology from surgery was benign  There is no evidence of hyperplasia, dysplasia, malignancy  She is recovering well from surgery  She has dysuria  Her performance status is 0   1   Urinalysis with reflex culture  She understands that due to pelvic inflammation secondary to surgery, dysuria is common  2   We reviewed ongoing activity limitations  3   Return in 3 weeks for postoperative pelvic examination

## 2023-01-16 ENCOUNTER — APPOINTMENT (EMERGENCY)
Dept: RADIOLOGY | Facility: HOSPITAL | Age: 33
End: 2023-01-16

## 2023-01-16 ENCOUNTER — HOSPITAL ENCOUNTER (EMERGENCY)
Facility: HOSPITAL | Age: 33
Discharge: HOME | End: 2023-01-16
Attending: EMERGENCY MEDICINE

## 2023-01-16 VITALS
SYSTOLIC BLOOD PRESSURE: 111 MMHG | HEART RATE: 83 BPM | TEMPERATURE: 97.4 F | OXYGEN SATURATION: 97 % | DIASTOLIC BLOOD PRESSURE: 70 MMHG | RESPIRATION RATE: 20 BRPM

## 2023-01-16 DIAGNOSIS — M79.601 RIGHT ARM PAIN: ICD-10-CM

## 2023-01-16 DIAGNOSIS — R11.0 NAUSEA: ICD-10-CM

## 2023-01-16 DIAGNOSIS — M25.511 RIGHT SHOULDER PAIN: Primary | ICD-10-CM

## 2023-01-16 LAB
ALBUMIN SERPL BCP-MCNC: 4 G/DL (ref 3.5–5)
ALP SERPL-CCNC: 131 U/L (ref 46–116)
ALT SERPL W P-5'-P-CCNC: 161 U/L (ref 12–78)
ANION GAP SERPL CALCULATED.3IONS-SCNC: 7 MMOL/L (ref 4–13)
AST SERPL W P-5'-P-CCNC: 60 U/L (ref 5–45)
ATRIAL RATE: 96 BPM
BASOPHILS # BLD AUTO: 0.07 THOUSANDS/ÂΜL (ref 0–0.1)
BASOPHILS NFR BLD AUTO: 1 % (ref 0–1)
BILIRUB SERPL-MCNC: 0.29 MG/DL (ref 0.2–1)
BUN SERPL-MCNC: 16 MG/DL (ref 5–25)
CALCIUM SERPL-MCNC: 9.3 MG/DL (ref 8.3–10.1)
CARDIAC TROPONIN I PNL SERPL HS: 2 NG/L
CHLORIDE SERPL-SCNC: 110 MMOL/L (ref 96–108)
CO2 SERPL-SCNC: 21 MMOL/L (ref 21–32)
CREAT SERPL-MCNC: 0.66 MG/DL (ref 0.6–1.3)
D DIMER PPP FEU-MCNC: 1.24 UG/ML FEU
EOSINOPHIL # BLD AUTO: 0.39 THOUSAND/ÂΜL (ref 0–0.61)
EOSINOPHIL NFR BLD AUTO: 5 % (ref 0–6)
ERYTHROCYTE [DISTWIDTH] IN BLOOD BY AUTOMATED COUNT: 12.6 % (ref 11.6–15.1)
FLUAV RNA RESP QL NAA+PROBE: NEGATIVE
FLUBV RNA RESP QL NAA+PROBE: NEGATIVE
GFR SERPL CREATININE-BSD FRML MDRD: 117 ML/MIN/1.73SQ M
GLUCOSE SERPL-MCNC: 93 MG/DL (ref 65–140)
HCG SERPL QL: NEGATIVE
HCT VFR BLD AUTO: 41.5 % (ref 34.8–46.1)
HGB BLD-MCNC: 14 G/DL (ref 11.5–15.4)
IMM GRANULOCYTES # BLD AUTO: 0.02 THOUSAND/UL (ref 0–0.2)
IMM GRANULOCYTES NFR BLD AUTO: 0 % (ref 0–2)
LYMPHOCYTES # BLD AUTO: 1.31 THOUSANDS/ÂΜL (ref 0.6–4.47)
LYMPHOCYTES NFR BLD AUTO: 17 % (ref 14–44)
MCH RBC QN AUTO: 31 PG (ref 26.8–34.3)
MCHC RBC AUTO-ENTMCNC: 33.7 G/DL (ref 31.4–37.4)
MCV RBC AUTO: 92 FL (ref 82–98)
MONOCYTES # BLD AUTO: 0.93 THOUSAND/ÂΜL (ref 0.17–1.22)
MONOCYTES NFR BLD AUTO: 12 % (ref 4–12)
NEUTROPHILS # BLD AUTO: 4.79 THOUSANDS/ÂΜL (ref 1.85–7.62)
NEUTS SEG NFR BLD AUTO: 65 % (ref 43–75)
NRBC BLD AUTO-RTO: 0 /100 WBCS
P AXIS: 55 DEGREES
PLATELET # BLD AUTO: 181 THOUSANDS/UL (ref 149–390)
PMV BLD AUTO: 10.9 FL (ref 8.9–12.7)
POTASSIUM SERPL-SCNC: 3.8 MMOL/L (ref 3.5–5.3)
PR INTERVAL: 124 MS
PROT SERPL-MCNC: 7.5 G/DL (ref 6.4–8.4)
QRS AXIS: 87 DEGREES
QRSD INTERVAL: 88 MS
QT INTERVAL: 360 MS
QTC INTERVAL: 454 MS
RBC # BLD AUTO: 4.52 MILLION/UL (ref 3.81–5.12)
RSV RNA RESP QL NAA+PROBE: NEGATIVE
SARS-COV-2 RNA RESP QL NAA+PROBE: NEGATIVE
SODIUM SERPL-SCNC: 138 MMOL/L (ref 135–147)
T WAVE AXIS: -4 DEGREES
VENTRICULAR RATE: 96 BPM
WBC # BLD AUTO: 7.51 THOUSAND/UL (ref 4.31–10.16)

## 2023-01-16 RX ORDER — ONDANSETRON 4 MG/1
4 TABLET, FILM COATED ORAL EVERY 6 HOURS
Qty: 12 TABLET | Refills: 0 | Status: SHIPPED | OUTPATIENT
Start: 2023-01-16

## 2023-01-16 RX ORDER — ONDANSETRON 2 MG/ML
4 INJECTION INTRAMUSCULAR; INTRAVENOUS ONCE
Status: COMPLETED | OUTPATIENT
Start: 2023-01-16 | End: 2023-01-16

## 2023-01-16 RX ORDER — DIAZEPAM 5 MG/ML
2.5 INJECTION, SOLUTION INTRAMUSCULAR; INTRAVENOUS ONCE
Status: COMPLETED | OUTPATIENT
Start: 2023-01-16 | End: 2023-01-16

## 2023-01-16 RX ORDER — KETOROLAC TROMETHAMINE 30 MG/ML
15 INJECTION, SOLUTION INTRAMUSCULAR; INTRAVENOUS ONCE
Status: COMPLETED | OUTPATIENT
Start: 2023-01-16 | End: 2023-01-16

## 2023-01-16 RX ORDER — SODIUM CHLORIDE, SODIUM GLUCONATE, SODIUM ACETATE, POTASSIUM CHLORIDE, MAGNESIUM CHLORIDE, SODIUM PHOSPHATE, DIBASIC, AND POTASSIUM PHOSPHATE .53; .5; .37; .037; .03; .012; .00082 G/100ML; G/100ML; G/100ML; G/100ML; G/100ML; G/100ML; G/100ML
1000 INJECTION, SOLUTION INTRAVENOUS ONCE
Status: COMPLETED | OUTPATIENT
Start: 2023-01-16 | End: 2023-01-16

## 2023-01-16 RX ADMIN — ONDANSETRON 4 MG: 2 INJECTION INTRAMUSCULAR; INTRAVENOUS at 20:57

## 2023-01-16 RX ADMIN — KETOROLAC TROMETHAMINE 15 MG: 30 INJECTION, SOLUTION INTRAMUSCULAR; INTRAVENOUS at 19:32

## 2023-01-16 RX ADMIN — DIAZEPAM 2.5 MG: 5 INJECTION INTRAMUSCULAR; INTRAVENOUS at 17:54

## 2023-01-16 RX ADMIN — SODIUM CHLORIDE, SODIUM GLUCONATE, SODIUM ACETATE, POTASSIUM CHLORIDE, MAGNESIUM CHLORIDE, SODIUM PHOSPHATE, DIBASIC, AND POTASSIUM PHOSPHATE 1000 ML: .53; .5; .37; .037; .03; .012; .00082 INJECTION, SOLUTION INTRAVENOUS at 17:56

## 2023-01-16 RX ADMIN — IOHEXOL 85 ML: 350 INJECTION, SOLUTION INTRAVENOUS at 18:15

## 2023-01-16 RX ADMIN — IOHEXOL 85 ML: 350 INJECTION, SOLUTION INTRAVENOUS at 20:41

## 2023-01-16 RX ADMIN — ONDANSETRON 4 MG: 2 INJECTION INTRAMUSCULAR; INTRAVENOUS at 17:54

## 2023-01-16 NOTE — ED PROVIDER NOTES
History  Chief Complaint   Patient presents with   • Shortness of Breath     Pt reports sudden onset of SOB, chest pain, bilateral shoulder pain; pt had hysterectomy approx 1 month ago     Patient is a 77-year-old female presenting to the emergency department for evaluation of sudden onset shortness of breath, chest pain, right shoulder pain as well as numbness and tingling down the right arm  Patient also notes that she has a headache as well as facial flushing since the onset of this event  Patient has past medical history significant for clots in the past   Patient with total abdominal hysterectomy within the month  Patient denies any fevers or chills chest pain, dysuria, hematuria, abdominal pain, vomiting  She does note she is nauseous  Prior to Admission Medications   Prescriptions Last Dose Informant Patient Reported? Taking?    QUEtiapine (SEROquel) 25 mg tablet   Yes No   Sig: Take 25 mg by mouth daily at bedtime   buPROPion (WELLBUTRIN SR) 100 mg 12 hr tablet   Yes No   Sig: Take 100 mg by mouth daily after breakfast   hydrOXYzine HCL (ATARAX) 50 mg tablet   No No   Sig: TAKE 0 5 TABLETS (25 MG TOTAL) BY MOUTH EVERY 6 (SIX) HOURS AS NEEDED FOR ANXIETY   ondansetron (ZOFRAN-ODT) 4 mg disintegrating tablet   Yes No   Sig: TAKE 1 TABLET BY MOUTH EVERY 8 HOURS AS NEEDED FOR NAUSEA AND VOMITING   rivaroxaban (Xarelto) 20 mg tablet   No No   Sig: Take 1 tablet (20 mg total) by mouth daily with breakfast   sertraline (ZOLOFT) 100 mg tablet   No No   Sig: TAKE 2 TABLETS (200 MG TOTAL) BY MOUTH DAILY AT BEDTIME      Facility-Administered Medications: None       Past Medical History:   Diagnosis Date   • Abnormal Pap smear of cervix    • Anxiety    • Depression    • History of positive PPD    • Hyperlipidemia    • Menorrhagia    • Pelvic kidney     left side   • PTSD (post-traumatic stress disorder)    • STD (sexually transmitted disease)    • Thrombosis of ovarian vein        Past Surgical History: Procedure Laterality Date   • APPENDECTOMY     • CYSTOSCOPY N/A 12/19/2022    Procedure: Larisa Fung URETERAL CATHETERS;  Surgeon: Emerita Blackburn MD;  Location: [facility];  Service: Gynecology Oncology   • NE COLPOSCOPY CERVIX VAG LOOP ELTRD BX CERVIX N/A 7/31/2020    Procedure: BIOPSY CONE/COLD KNIFE CERVIX, ECC;  Surgeon: Dagoberto Richmond MD;  Location: [facility];  Service: Gynecology   • NE LAPS TOTAL HYSTERECT 250 GM/< W/RMVL TUBE/OVARY N/A 12/19/2022    Procedure: HYSTERECTOMY LAPAROSCOPIC TOTAL ([address]) W/ ROBOTICS, BILATERAL SALPINGECTOMY;  Surgeon: Emerita Blackubrn MD;  Location[facility];  Service: Gynecology Oncology       Family History   Problem Relation Age of Onset   • Crohn's disease Brother    • Crohn's disease Maternal Aunt    • Diabetes Maternal Grandmother    • Clotting disorder Maternal Grandmother    • Hypertension Maternal Grandmother    • Diabetes Maternal Grandfather    • Diabetes Paternal Grandmother    • Diabetes Paternal Grandfather    • COPD Mother    • Diabetes Father    • Hypertension Father    • Depression Sister      I have reviewed and agree with the history as documented  E-Cigarette/Vaping   • E-Cigarette Use Never User      E-Cigarette/Vaping Substances   • Nicotine No    • THC No    • CBD No    • Flavoring No    • Other No    • Unknown No      Social History     Tobacco Use   • Smoking status: Some Days     Packs/day: 0 25     Years: 10 00     Pack years: 2 50     Types: Cigarettes   • Smokeless tobacco: Never   • Tobacco comments:     4 cig / day    Vaping Use   • Vaping Use: Never used   Substance Use Topics   • Alcohol use: Yes     Comment: 2 drinks on weekends   • Drug use: No        Review of Systems   Constitutional: Positive for activity change and fatigue  Negative for chills and fever  HENT: Negative for congestion, ear pain, sore throat and tinnitus  Eyes: Negative for photophobia, pain and visual disturbance     Respiratory: Positive for shortness of breath  Negative for cough and chest tightness  Cardiovascular: Negative for chest pain and palpitations  Gastrointestinal: Positive for nausea  Negative for abdominal pain, constipation, diarrhea and vomiting  Genitourinary: Negative for dysuria and hematuria  Musculoskeletal: Negative for arthralgias, back pain, myalgias and neck pain  Skin: Negative for color change and rash  Neurological: Positive for numbness  Negative for dizziness, seizures, syncope, weakness and headaches  All other systems reviewed and are negative  Physical Exam  ED Triage Vitals [01/16/23 1736]   Temperature Pulse Respirations Blood Pressure SpO2   (!) 97 4 °F (36 3 °C) 84 (!) 26 149/94 98 %      Temp Source Heart Rate Source Patient Position - Orthostatic VS BP Location FiO2 (%)   Oral Monitor Lying Left arm --      Pain Score       10 - Worst Possible Pain             Orthostatic Vital Signs  Vitals:    01/16/23 1736 01/16/23 1900 01/16/23 2117   BP: 149/94 107/66 111/70   Pulse: 84 84 83   Patient Position - Orthostatic VS: Lying  Lying       Physical Exam  Vitals and nursing note reviewed  Constitutional:       General: She is in acute distress  HENT:      Head: Normocephalic and atraumatic  Mouth/Throat:      Mouth: Mucous membranes are moist    Eyes:      Extraocular Movements: Extraocular movements intact  Conjunctiva/sclera: Conjunctivae normal       Pupils: Pupils are equal, round, and reactive to light  Cardiovascular:      Rate and Rhythm: Normal rate and regular rhythm  Heart sounds: No murmur heard  Pulmonary:      Effort: Pulmonary effort is normal  Tachypnea present  No respiratory distress  Breath sounds: Normal breath sounds  No decreased breath sounds  Abdominal:      Palpations: Abdomen is soft  Tenderness: There is no abdominal tenderness  Musculoskeletal:         General: No swelling  Normal range of motion        Cervical back: Normal range of motion and neck supple  Right lower leg: No tenderness  No edema  Left lower leg: No tenderness  No edema  Skin:     General: Skin is warm and dry  Capillary Refill: Capillary refill takes less than 2 seconds  Neurological:      Mental Status: She is alert and oriented to person, place, and time  GCS: GCS eye subscore is 4  GCS verbal subscore is 5  GCS motor subscore is 6  Cranial Nerves: No cranial nerve deficit  Sensory: Sensation is intact  Motor: Motor function is intact  No weakness or abnormal muscle tone  Coordination: Coordination is intact  Comments: Patient's right arm is cold compared to the left pulses are equal bilaterally  Psychiatric:         Mood and Affect: Mood normal          ED Medications  Medications   multi-electrolyte (ISOLYTE-S PH 7 4) bolus 1,000 mL (0 mL Intravenous Stopped 1/16/23 1935)   diazepam (VALIUM) injection 2 5 mg (2 5 mg Intravenous Given 1/16/23 1754)   ondansetron (ZOFRAN) injection 4 mg (4 mg Intravenous Given 1/16/23 1754)   iohexol (OMNIPAQUE) 350 MG/ML injection (SINGLE-DOSE) 85 mL (85 mL Intravenous Given 1/16/23 1815)   ketorolac (TORADOL) injection 15 mg (15 mg Intravenous Given 1/16/23 1932)   ondansetron (ZOFRAN) injection 4 mg (4 mg Intravenous Given 1/16/23 2057)   iohexol (OMNIPAQUE) 350 MG/ML injection (SINGLE-DOSE) 85 mL (85 mL Intravenous Given 1/16/23 2041)       Diagnostic Studies  Results Reviewed     Procedure Component Value Units Date/Time    FLU/RSV/COVID - if FLU/RSV clinically relevant [[id number]]  (Normal) Collected: 01/16/23 1914    Lab Status: Final result Specimen: Nares from Nose Updated: 01/16/23 2003     SARS-CoV-2 Negative     INFLUENZA A PCR Negative     INFLUENZA B PCR Negative     RSV PCR Negative    Narrative:      FOR PEDIATRIC PATIENTS - copy/paste COVID Guidelines URL to browser: [email or web address]    SARS-CoV-2 assay is a Nucleic Acid Amplification assay intended for the  qualitative detection of nucleic acid from SARS-CoV-2 in nasopharyngeal  swabs  Results are for the presumptive identification of SARS-CoV-2 RNA  Positive results are indicative of infection with SARS-CoV-2, the virus  causing COVID-19, but do not rule out bacterial infection or co-infection  with other viruses  Laboratories within the [place] and its  territories are required to report all positive results to the appropriate  public health authorities  Negative results do not preclude SARS-CoV-2  infection and should not be used as the sole basis for treatment or other  patient management decisions  Negative results must be combined with  clinical observations, patient history, and epidemiological information  This test has not been FDA cleared or approved  This test has been authorized by FDA under an Emergency Use Authorization  (EUA)  This test is only authorized for the duration of time the  declaration that circumstances exist justifying the authorization of the  emergency use of an in vitro diagnostic tests for detection of SARS-CoV-2  virus and/or diagnosis of COVID-19 infection under section 564(b)(1) of  the Act, 21 U  S C  495IPO-3(V)(2), unless the authorization is terminated  or revoked sooner  The test has been validated but independent review by FDA  and CLIA is pending  Test performed using [employer] GeneXpert: This RT-PCR assay targets N2,  a region unique to SARS-CoV-2  A conserved region in the E-gene was chosen  for pan-Sarbecovirus detection which includes SARS-CoV-2  According to CMS-2020-01-R, this platform meets the definition of high-throughput technology      HS Troponin 0hr (reflex protocol) [[id number]]  (Normal) Collected: 01/16/23 1754    Lab Status: Final result Specimen: Blood from Arm, Left Updated: 01/16/23 1910     hs TnI 0hr 2 ng/L     hCG, qualitative pregnancy [[id number]]  (Normal) Collected: 01/16/23 1754    Lab Status: Final result Specimen: Blood from Arm, Left Updated: 01/16/23 1827     Preg, Serum Negative    D-dimer, quantitative [[id number]]  (Abnormal) Collected: 01/16/23 1754    Lab Status: Final result Specimen: Blood from Arm, Left Updated: 01/16/23 1826     D-Dimer, Quant 1 24 ug/ml FEU     Comprehensive metabolic panel [[id number]]  (Abnormal) Collected: 01/16/23 1754    Lab Status: Final result Specimen: Blood from Arm, Left Updated: 01/16/23 1826     Sodium 138 mmol/L      Potassium 3 8 mmol/L      Chloride 110 mmol/L      CO2 21 mmol/L      ANION GAP 7 mmol/L      BUN 16 mg/dL      Creatinine 0 66 mg/dL      Glucose 93 mg/dL      Calcium 9 3 mg/dL      AST 60 U/L       U/L      Alkaline Phosphatase 131 U/L      Total Protein 7 5 g/dL      Albumin 4 0 g/dL      Total Bilirubin 0 29 mg/dL      eGFR 117 ml/min/1 73sq m     Narrative:      [place] guidelines for Chronic Kidney Disease (CKD):   •  Stage 1 with normal or high GFR (GFR > 90 mL/min/1 73 square meters)  •  Stage 2 Mild CKD (GFR = 60-89 mL/min/1 73 square meters)  •  Stage 3A Moderate CKD (GFR = 45-59 mL/min/1 73 square meters)  •  Stage 3B Moderate CKD (GFR = 30-44 mL/min/1 73 square meters)  •  Stage 4 Severe CKD (GFR = 15-29 mL/min/1 73 square meters)  •  Stage 5 End Stage CKD (GFR <15 mL/min/1 73 square meters)  Note: GFR calculation is accurate only with a steady state creatinine    CBC and differential [[id number]] Collected: 01/16/23 1754    Lab Status: Final result Specimen: Blood from Arm, Left Updated: 01/16/23 1805     WBC 7 51 Thousand/uL      RBC 4 52 Million/uL      Hemoglobin 14 0 g/dL      Hematocrit 41 5 %      MCV 92 fL      MCH 31 0 pg      MCHC 33 7 g/dL      RDW 12 6 %      MPV 10 9 fL      Platelets 895 Thousands/uL      nRBC 0 /100 WBCs      Neutrophils Relative 65 %      Immat GRANS % 0 %      Lymphocytes Relative 17 %      Monocytes Relative 12 %      Eosinophils Relative 5 %      Basophils Relative 1 % Neutrophils Absolute 4 79 Thousands/µL      Immature Grans Absolute 0 02 Thousand/uL      Lymphocytes Absolute 1 31 Thousands/µL      Monocytes Absolute 0 93 Thousand/µL      Eosinophils Absolute 0 39 Thousand/µL      Basophils Absolute 0 07 Thousands/µL                  CTA ED chest PE Study   Final Result by Boaz Salomon MD (01/16 2107)      No evidence of pulmonary embolus  Workstation performed: [id number]         XR chest 1 view portable   ED Interpretation by Olivia Saavedra DO (01/16 1913)   No acute cardiopulmonary process noted      CTA neck w wo contrast   Final Result by Crystal Faust MD (01/16 1847)      No stenosis, dissection or occlusion of the carotid or vertebral arteries  Workstation performed: [id number]               Procedures  Procedures      ED Course  ED Course as of 01/16/23 2123 Mon Jan 16, 2023   1803 Not waiting for labs, r/o dissection   1806 Comprehensive metabolic panel   5282 PREGNANCY, SERUM: Negative   1827 D-Dimer, Quant(!): 1 24  PE study   1849 No stenosis, dissection or occlusion of the carotid or vertebral arteries  5219 Calling for radiology approval   1904 dr Jose Luis Lopez approved for PE study   1911 hs TnI 0hr: 2   2007 SARS-COV-2: Negative   2007 INFLU A PCR: Negative   2007 INFLU B PCR: Negative   2007 RSV PCR: Negative   2117 No evidence of pulmonary embolus  2117 Pt re-examined and evaluated after testing and treatment  Patient informed of all lab and imaging findings  Spoke with the patient and feeling improved and sxs have resolved  Will discharge home with close f/u with pcp and instructed to return to the ED if sxs worsen or continue  Pt agrees with the plan for discharge and feels comfortable to go home with proper f/u  Advised to return for worsening or additional problems  Diagnostic tests were reviewed and questions answered  Diagnosis, care plan and treatment options were discussed   The patient understand instructions and will follow up as directed  Advised to follow up with their pcp in a few days for re-evaluation  Advised to continue symptomatic care with over the counter mediations  Patient is stable for discharge  MDM      Disposition  Final diagnoses:   Right shoulder pain   Right arm pain   Nausea     Time reflects when diagnosis was documented in both MDM as applicable and the Disposition within this note     Time User Action Codes Description Comment    1/16/2023  9:18 PM Heloise Brood Add [M25 511] Right shoulder pain     1/16/2023  9:18 PM Heloise Brood Add [M79 601] Right arm pain     1/16/2023  9:23 PM Heloise Brood Add [R11 0] Nausea       ED Disposition     ED Disposition   Discharge    Condition   Stable    Date/Time   Mon Jan 16, 2023  9:18 PM    Comment   Charles Rodgers discharge to home/self care  Follow-up Information     Follow up With Specialties Details Why Contact Info Additional [address] [facility] Emergency Medicine Go to  As needed, If symptoms worsen [address] [phone]  [address] Emergency Department, [address] [place] [address], [address] [place] [address] Family Medicine Schedule an appointment as soon as possible for a visit  for follow up [address], [address] [facility] [phone]  [address], [address], [address] [place], [place], [address] [place] [address] Avenue          Patient's Medications   Discharge Prescriptions    ONDANSETRON (ZOFRAN) 4 MG TABLET    Take 1 tablet (4 mg total) by mouth every 6 (six) hours       Start Date: 1/16/2023 End Date: --       Order Dose: 4 mg       Quantity: 12 tablet    Refills: 0     No discharge procedures on file      PDMP Review       Value Time User    PDMP Reviewed  Yes 7/7/2022  6:03 PM Salena Narayan MD           ED Provider  Attending physically available and evaluated Sheryle Ravens  ABRAHAM managed the patient along with the ED Attending      Electronically Signed by         Kiel Muniz DO  01/16/23 2123

## 2023-01-17 NOTE — ED ATTENDING ATTESTATION
1/16/2023  IKary DO, saw and evaluated the patient  I have discussed the patient with the resident/non-physician practitioner and agree with the resident's/non-physician practitioner's findings, Plan of Care, and MDM as documented in the resident's/non-physician practitioner's note, except where noted  All available labs and Radiology studies were reviewed  I was present for key portions of any procedure(s) performed by the resident/non-physician practitioner and I was immediately available to provide assistance  At this point I agree with the current assessment done in the Emergency Department  I have conducted an independent evaluation of this patient a history and physical is as follows:    60-year-old female presents for evaluation of acute onset right shoulder, right arm, right chest, right back, right face pain that she describes as burning  Pain is worse with movement as well as deep inspiration  Symptoms started acutely while she was sitting  Has never had anything like this happen before  Last month she had a robotic total abdominal hysterectomy, bilateral salpingectomy, and ureteral stent placement by Dr Curtis Pettit from gyn-onc  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  She is still compliant with this  Symptoms are rated as severe, no alleviating factors  Exacerbating factors as noted above  History obtained from patient as well as her mother at the bedside  I reviewed patient's records in epic including op note from December 2022 with the above procedures listed  Concern for possible VTE event/dissection of right internal carotid versus arch versus right subclavian  PE also within differential but considered less likely  Patient has palpable 2+ radial pulses bilaterally however her right hand and right wrist are cool to touch    Strength is equal bilaterally    Impression: Right arm, chest, back, head and face pain unclear etiology differential noted as above  Plan: Stat CTA of aortic arch and neck to evaluate for possible carotid dissection or arch dissection with flap partially occluding the right subclavian  Evaluate for possible VTE as well    We will treat symptoms in the meantime and reassess        ED Course         Critical Care Time  Procedures

## 2023-01-19 ENCOUNTER — APPOINTMENT (OUTPATIENT)
Dept: LAB | Facility: CLINIC | Age: 33
End: 2023-01-19

## 2023-01-19 LAB
BASOPHILS # BLD AUTO: 0.05 THOUSANDS/ÂΜL (ref 0–0.1)
BASOPHILS NFR BLD AUTO: 1 % (ref 0–1)
BILIRUB UR QL STRIP: NEGATIVE
CLARITY UR: CLEAR
COLOR UR: COLORLESS
EOSINOPHIL # BLD AUTO: 0.29 THOUSAND/ÂΜL (ref 0–0.61)
EOSINOPHIL NFR BLD AUTO: 4 % (ref 0–6)
ERYTHROCYTE [DISTWIDTH] IN BLOOD BY AUTOMATED COUNT: 12.8 % (ref 11.6–15.1)
FERRITIN SERPL-MCNC: 69 NG/ML (ref 8–388)
GLUCOSE UR STRIP-MCNC: NEGATIVE MG/DL
HCT VFR BLD AUTO: 43.3 % (ref 34.8–46.1)
HGB BLD-MCNC: 14.4 G/DL (ref 11.5–15.4)
HGB UR QL STRIP.AUTO: NEGATIVE
IMM GRANULOCYTES # BLD AUTO: 0.03 THOUSAND/UL (ref 0–0.2)
IMM GRANULOCYTES NFR BLD AUTO: 0 % (ref 0–2)
KETONES UR STRIP-MCNC: NEGATIVE MG/DL
LEUKOCYTE ESTERASE UR QL STRIP: NEGATIVE
LYMPHOCYTES # BLD AUTO: 1.46 THOUSANDS/ÂΜL (ref 0.6–4.47)
LYMPHOCYTES NFR BLD AUTO: 20 % (ref 14–44)
MCH RBC QN AUTO: 31.1 PG (ref 26.8–34.3)
MCHC RBC AUTO-ENTMCNC: 33.3 G/DL (ref 31.4–37.4)
MCV RBC AUTO: 94 FL (ref 82–98)
MONOCYTES # BLD AUTO: 0.65 THOUSAND/ÂΜL (ref 0.17–1.22)
MONOCYTES NFR BLD AUTO: 9 % (ref 4–12)
NEUTROPHILS # BLD AUTO: 4.96 THOUSANDS/ÂΜL (ref 1.85–7.62)
NEUTS SEG NFR BLD AUTO: 66 % (ref 43–75)
NITRITE UR QL STRIP: NEGATIVE
NRBC BLD AUTO-RTO: 0 /100 WBCS
PH UR STRIP.AUTO: 7 [PH]
PLATELET # BLD AUTO: 181 THOUSANDS/UL (ref 149–390)
PMV BLD AUTO: 10.6 FL (ref 8.9–12.7)
PROT UR STRIP-MCNC: NEGATIVE MG/DL
RBC # BLD AUTO: 4.63 MILLION/UL (ref 3.81–5.12)
SP GR UR STRIP.AUTO: 1.01 (ref 1–1.03)
UROBILINOGEN UR STRIP-ACNC: <2 MG/DL
WBC # BLD AUTO: 7.44 THOUSAND/UL (ref 4.31–10.16)

## 2023-01-20 LAB
IRON SATN MFR SERPL: 36 % (ref 15–50)
IRON SERPL-MCNC: 145 UG/DL (ref 50–170)
TIBC SERPL-MCNC: 405 UG/DL (ref 250–450)

## 2023-01-23 ENCOUNTER — HOSPITAL ENCOUNTER (OUTPATIENT)
Dept: RADIOLOGY | Facility: HOSPITAL | Age: 33
Discharge: HOME | End: 2023-01-23

## 2023-01-23 ENCOUNTER — OFFICE VISIT (OUTPATIENT)
Dept: GYNECOLOGIC ONCOLOGY | Facility: CLINIC | Age: 33
End: 2023-01-23

## 2023-01-23 ENCOUNTER — APPOINTMENT (OUTPATIENT)
Dept: LAB | Facility: CLINIC | Age: 33
End: 2023-01-23

## 2023-01-23 VITALS
OXYGEN SATURATION: 99 % | BODY MASS INDEX: 28.12 KG/M2 | HEIGHT: 66 IN | DIASTOLIC BLOOD PRESSURE: 70 MMHG | SYSTOLIC BLOOD PRESSURE: 116 MMHG | WEIGHT: 175 LBS | HEART RATE: 92 BPM | TEMPERATURE: 97.3 F

## 2023-01-23 DIAGNOSIS — G89.18 POSTOPERATIVE PAIN: ICD-10-CM

## 2023-01-23 DIAGNOSIS — Z98.890 POSTOPERATIVE STATE: Primary | ICD-10-CM

## 2023-01-23 LAB
BASOPHILS # BLD AUTO: 0.07 THOUSANDS/ÂΜL (ref 0–0.1)
BASOPHILS NFR BLD AUTO: 1 % (ref 0–1)
EOSINOPHIL # BLD AUTO: 0.2 THOUSAND/ÂΜL (ref 0–0.61)
EOSINOPHIL NFR BLD AUTO: 2 % (ref 0–6)
ERYTHROCYTE [DISTWIDTH] IN BLOOD BY AUTOMATED COUNT: 12.7 % (ref 11.6–15.1)
HCT VFR BLD AUTO: 41.8 % (ref 34.8–46.1)
HGB BLD-MCNC: 13.8 G/DL (ref 11.5–15.4)
IMM GRANULOCYTES # BLD AUTO: 0.04 THOUSAND/UL (ref 0–0.2)
IMM GRANULOCYTES NFR BLD AUTO: 1 % (ref 0–2)
LYMPHOCYTES # BLD AUTO: 1.53 THOUSANDS/ÂΜL (ref 0.6–4.47)
LYMPHOCYTES NFR BLD AUTO: 18 % (ref 14–44)
MCH RBC QN AUTO: 30.7 PG (ref 26.8–34.3)
MCHC RBC AUTO-ENTMCNC: 33 G/DL (ref 31.4–37.4)
MCV RBC AUTO: 93 FL (ref 82–98)
MONOCYTES # BLD AUTO: 0.62 THOUSAND/ÂΜL (ref 0.17–1.22)
MONOCYTES NFR BLD AUTO: 7 % (ref 4–12)
NEUTROPHILS # BLD AUTO: 6.08 THOUSANDS/ÂΜL (ref 1.85–7.62)
NEUTS SEG NFR BLD AUTO: 71 % (ref 43–75)
NRBC BLD AUTO-RTO: 0 /100 WBCS
PLATELET # BLD AUTO: 193 THOUSANDS/UL (ref 149–390)
PMV BLD AUTO: 11.3 FL (ref 8.9–12.7)
RBC # BLD AUTO: 4.49 MILLION/UL (ref 3.81–5.12)
WBC # BLD AUTO: 8.54 THOUSAND/UL (ref 4.31–10.16)

## 2023-01-23 RX ORDER — VENLAFAXINE HYDROCHLORIDE 37.5 MG/1
37.5 CAPSULE, EXTENDED RELEASE ORAL EVERY MORNING
COMMUNITY
Start: 2023-01-18

## 2023-01-23 RX ADMIN — IOHEXOL 100 ML: 350 INJECTION, SOLUTION INTRAVENOUS at 14:18

## 2023-01-23 NOTE — ASSESSMENT & PLAN NOTE
69-year-old with AUB now s/p robotic-assisted TLH, bilateral salpingectomy and cystoscopy on 12/19/22  Her final pathology was benign  She has persistent pelvic cramping and vaginal discharge  Clinical exam is concerning for vaginal cuff hematoma vs  abscess  Plan for STAT CT abdomen and pelvis as well as CBC/Diff  Will follow-up with patient as soon as results are available

## 2023-01-23 NOTE — PROGRESS NOTES
Assessment/Plan:    Problem List Items Addressed This Visit        Other    Postoperative state - Primary     70-year-old with AUB now s/p robotic-assisted TLH, bilateral salpingectomy and cystoscopy on 12/19/22  Her final pathology was benign  She has persistent pelvic cramping and vaginal discharge  Clinical exam is concerning for vaginal cuff hematoma vs  abscess  Plan for STAT CT abdomen and pelvis as well as CBC/Diff  Will follow-up with patient as soon as results are available  Other Visit Diagnoses     Postoperative pain        Relevant Orders    CT abdomen pelvis w contrast    CBC and differential            CHIEF COMPLAINT:   Post-operative evaluation    Problem:  AUB    Previous therapy:  Robotic-assisted TLH, bilateral salpingectomy and cystectomy on 12/19/22  A  Benign    Patient ID: Fran Ott is a 28 y o  female  who presents to the office for post-operative evaluation  The patient has been afebrile  She notes persistent pelvic cramping and vaginal discharge  She notes the discharge has an occasional odor  The cramping is worse after increased activity  Normal bowel/bladder function  She notes nausea, but no vomiting  She is without vaginal bleeding  The following portions of the patient's history were reviewed and updated as appropriate: allergies, current medications, past medical history, past surgical history and problem list     Review of Systems   Constitutional: Negative  Negative for fever  HENT: Negative  Eyes: Negative  Respiratory: Negative  Cardiovascular: Negative  Gastrointestinal: Positive for nausea  Negative for vomiting  Genitourinary: Positive for pelvic pain (cramping) and vaginal discharge  Musculoskeletal: Negative  Skin: Negative  Neurological: Negative  Psychiatric/Behavioral: Negative          Current Outpatient Medications   Medication Sig Dispense Refill   • buPROPion (WELLBUTRIN SR) 100 mg 12 hr tablet Take 100 mg by mouth daily after breakfast     • ondansetron (ZOFRAN) 4 mg tablet Take 1 tablet (4 mg total) by mouth every 6 (six) hours 12 tablet 0   • ondansetron (ZOFRAN-ODT) 4 mg disintegrating tablet TAKE 1 TABLET BY MOUTH EVERY 8 HOURS AS NEEDED FOR NAUSEA AND VOMITING     • QUEtiapine (SEROquel) 25 mg tablet Take 25 mg by mouth daily at bedtime     • sertraline (ZOLOFT) 100 mg tablet TAKE 2 TABLETS (200 MG TOTAL) BY MOUTH DAILY AT BEDTIME 90 tablet 2   • venlafaxine (EFFEXOR-XR) 37 5 mg 24 hr capsule Take 37 5 mg by mouth every morning     • hydrOXYzine HCL (ATARAX) 50 mg tablet TAKE 0 5 TABLETS (25 MG TOTAL) BY MOUTH EVERY 6 (SIX) HOURS AS NEEDED FOR ANXIETY 90 tablet 1   • rivaroxaban (Xarelto) 20 mg tablet Take 1 tablet (20 mg total) by mouth daily with breakfast 30 tablet 2     No current facility-administered medications for this visit  Objective:    Blood pressure 116/70, pulse 92, temperature (!) 97 3 °F (36 3 °C), temperature source Temporal, height 5' 6" (1 676 m), weight 79 4 kg (175 lb), SpO2 99 %, not currently breastfeeding  Body mass index is 28 25 kg/m²  Body surface area is 1 89 meters squared  Physical Exam  Vitals reviewed  Exam conducted with a chaperone present  Constitutional:       General: She is not in acute distress  Appearance: Normal appearance  HENT:      Head: Normocephalic and atraumatic  Mouth/Throat:      Mouth: Mucous membranes are moist    Abdominal:      Palpations: Abdomen is soft  There is no mass  Tenderness: There is no abdominal tenderness  Genitourinary:     Comments: The external female genitalia is normal  The bartholin's, uretheral and skenes glands are normal  The urethral meatus is normal (midline with no lesions)  Anus without fissure or lesion  Speculum exam reveals a grossly normal vagina  Vagina is intact, without dehiscense  No masses, lesions, discharge or bleeding  No significant cystocele or rectocele noted   Bimanual exam notes a surgical absent cervix and uterus  There is firmness at the mid-to-left apex  There is limited mobility of the cuff  No masses or fullness  Bladder is without fullness, mass or tenderness  Skin:     General: Skin is warm and dry  Comments: Surgical trocar sites are well healed  Neurological:      Mental Status: She is alert and oriented to person, place, and time  Psychiatric:         Mood and Affect: Mood normal          Behavior: Behavior normal          Thought Content:  Thought content normal          Judgment: Judgment normal

## 2023-01-23 NOTE — LETTER
[address] [place], [place] [address] [facility] [id number]      February 2, 2023    MRN: [id number]     Phone: [phone]     Dear Ms Taj Sánchez recently had a(n) Cat Scan performed on 1/23/2023 at  [address] [place] [address] that was requested by Almas Navarro PA-C  The study was reviewed by a radiologist, which is a physician who specializes in medical imaging  The radiologist issued a report describing his or her findings  In that report there was a finding that the radiologist felt warranted further discussion with your health care provider and that discussion would be beneficial to you  The results were sent to Almas Navarro PA-C on 01/23/2023  3:29 PM  We recommend that you contact Almas Navarro PA-C at [phone] or set up an appointment to discuss the results of the imaging test  If you have already heard from Almas Navarro PA-C regarding the results of your study, you can disregard this letter  This letter is not meant to alarm you, but intended to encourage you to follow-up on your results with the provider that sent you for the imaging study  In addition, we have enclosed answers to frequently asked questions by other patients who have also received a letter to review results with their health care provider (see page two)  Thank you for choosing [facility] for your medical imaging needs  FREQUENTLY ASKED QUESTIONS    1  Why am I receiving this letter? [address] [place] [address] requires us to notify patients who have findings on imaging exams that may require more testing or follow-up with a health professional within the next 3 months          2  How serious is the finding on the imaging test?  This letter is sent to all patients who may need follow-up or more testing within the next 3 months  Receiving this letter does not necessarily mean you have a life-threatening imaging finding or disease  Recommendations in the radiologist’s imaging report are general in nature and it is up to your healthcare provider to say whether those recommendations make sense for your situation  You are strongly encouraged to talk to your health care provider about the results and ask whether additional steps need to be taken  3  Where can I get a copy of the final report for my recent radiology exam?  To get a full copy of the report you can access your records online at [email or web address] or please contact [facility] Medical Records Department at [phone] Monday through Friday between 8 am and 6 pm          4  What do I need to do now? Please contact your health care provider who requested the imaging study to discuss what further actions (if any) are needed  You may have already heard from (your ordering provider) in regard to this test in which case you can disregard this letter  NOTICE IN ACCORDANCE WITH THE [place] STATE “PATIENT TEST RESULT INFORMATION ACT OF 2018”    You are receiving this notice as a result of a determination by your diagnostic imaging service that further discussions of your test results are warranted and would be beneficial to you  The complete results of your test or tests have been or will be sent to the health care practitioner that ordered the test or tests  It is recommended that you contact your health care practitioner to discuss your results as soon as possible

## 2023-01-24 NOTE — RESULT ENCOUNTER NOTE
Imaging reviewed with Dr Elverna Schaumann  Patient is afebrile, without leukocytosis  Will plan to closely monitor  Patient will call with new/worsening symptoms, development of fevers

## 2023-01-30 ENCOUNTER — TELEPHONE (OUTPATIENT)
Dept: PSYCHIATRY | Facility: CLINIC | Age: 33
End: 2023-01-30

## 2023-01-30 NOTE — TELEPHONE ENCOUNTER
Called patient regarding referral to be placed on proper wait list  Unable to LVM for patient to call intake dept ([phone]) back

## 2023-02-04 DIAGNOSIS — I82.890 THROMBOSIS OF OVARIAN VEIN: ICD-10-CM

## 2023-02-07 NOTE — TELEPHONE ENCOUNTER
Called patient regarding referral to be placed on proper wait list  Unable to LVM for patient to call intake dept ([phone]) back

## 2023-03-08 RX ORDER — RIVAROXABAN 20 MG/1
TABLET, FILM COATED ORAL
Qty: 30 TABLET | Refills: 2 | Status: SHIPPED | OUTPATIENT
Start: 2023-03-08

## 2023-03-21 ENCOUNTER — OFFICE VISIT (OUTPATIENT)
Dept: FAMILY MEDICINE CLINIC | Facility: CLINIC | Age: 33
End: 2023-03-21

## 2023-03-21 VITALS
HEIGHT: 66 IN | RESPIRATION RATE: 18 BRPM | HEART RATE: 99 BPM | BODY MASS INDEX: 28.06 KG/M2 | WEIGHT: 174.6 LBS | TEMPERATURE: 98 F | SYSTOLIC BLOOD PRESSURE: 112 MMHG | DIASTOLIC BLOOD PRESSURE: 76 MMHG | OXYGEN SATURATION: 99 %

## 2023-03-21 DIAGNOSIS — E66.3 OVERWEIGHT (BMI 25.0-29.9): ICD-10-CM

## 2023-03-21 DIAGNOSIS — G43.809 OTHER MIGRAINE WITHOUT STATUS MIGRAINOSUS, NOT INTRACTABLE: Primary | ICD-10-CM

## 2023-03-21 PROBLEM — G43.909 MIGRAINE: Status: ACTIVE | Noted: 2023-03-21

## 2023-03-21 RX ORDER — NAPROXEN 500 MG/1
500 TABLET ORAL 2 TIMES DAILY WITH MEALS
Qty: 60 TABLET | Refills: 0 | Status: SHIPPED | OUTPATIENT
Start: 2023-03-21 | End: 2023-04-20

## 2023-03-21 NOTE — PROGRESS NOTES
Name: Lillian Sims      : 1990      MRN: [id number]  Encounter Provider: Elba Burr MD  Encounter Date: 3/21/2023   Encounter department: [address] [place] [address]  Other migraine without status migrainosus, not intractable  Assessment & Plan:  H/o frequent unilateral throbbing headaches, assoc wt photophobia, photophobia, nausea most likely 2/2 migraine, r/o tension  Positive history of migraine in father  Has tried prophylactic therapy propranolol wo improvement  Given 1 episode that occurred today in the last several months, will hold off prophylactic therapy at this time  Advised Tylenol prn and naproxen ordered  Ct zofran for nausea  Follow-up in 6 weeks  Orders:  -     naproxen (Naprosyn) 500 mg tablet; Take 1 tablet (500 mg total) by mouth 2 (two) times a day with meals    2  Overweight (BMI 25 0-29 9)  -     Ambulatory Referral to Nutrition Services; Future         Subjective      Watrous Lat is a 42-year-old female presenting today after she felt unilateral throbbing headache at work today that was associated with dizziness, photophobia and phonophobia  Patient has a chronic history of frequent intermittent headaches but has not had episodes in a while but several months until now  She was was treated with propranolol and magnesium but she reports no improvement  During eval, patient has improved significantly, headache is resolving  She is otherwise at baseline  She admits to adequate hydration but no nutrition and request referral to nutritionist   She reports that she gets up to 8 hours of sleep every night    Review of Systems   Constitutional: Negative for activity change, appetite change, fatigue and fever  Respiratory: Negative for cough, shortness of breath and wheezing  Cardiovascular: Negative for chest pain, palpitations and leg swelling  Gastrointestinal: Positive for nausea   Negative for abdominal pain, diarrhea and vomiting  Genitourinary: Negative for dysuria and pelvic pain  Skin: Negative for rash  Neurological: Positive for numbness and headaches  Negative for weakness  Current Outpatient Medications on File Prior to Visit   Medication Sig   • ondansetron (ZOFRAN-ODT) 4 mg disintegrating tablet TAKE 1 TABLET BY MOUTH EVERY 8 HOURS AS NEEDED FOR NAUSEA AND VOMITING   • buPROPion (WELLBUTRIN SR) 100 mg 12 hr tablet Take 100 mg by mouth daily after breakfast   • hydrOXYzine HCL (ATARAX) 50 mg tablet TAKE 0 5 TABLETS (25 MG TOTAL) BY MOUTH EVERY 6 (SIX) HOURS AS NEEDED FOR ANXIETY   • ondansetron (ZOFRAN) 4 mg tablet Take 1 tablet (4 mg total) by mouth every 6 (six) hours   • QUEtiapine (SEROquel) 25 mg tablet Take 25 mg by mouth daily at bedtime   • sertraline (ZOLOFT) 100 mg tablet TAKE 2 TABLETS (200 MG TOTAL) BY MOUTH DAILY AT BEDTIME   • venlafaxine (EFFEXOR-XR) 37 5 mg 24 hr capsule Take 75 mg by mouth every morning   • Xarelto 20 MG tablet TAKE 1 TABLET BY MOUTH DAILY WITH BREAKFAST       Objective     /76   Pulse 99   Temp 98 °F (36 7 °C)   Resp 18   Ht 5' 6" (1 676 m)   Wt 79 2 kg (174 lb 9 6 oz)   LMP  (LMP Unknown)   SpO2 99%   BMI 28 18 kg/m²     Physical Exam  Constitutional:       General: She is not in acute distress  Appearance: Normal appearance  She is not ill-appearing, toxic-appearing or diaphoretic  HENT:      Head: Normocephalic and atraumatic  Right Ear: External ear normal       Left Ear: External ear normal       Mouth/Throat:      Mouth: Mucous membranes are moist    Eyes:      Extraocular Movements: Extraocular movements intact  Conjunctiva/sclera: Conjunctivae normal       Pupils: Pupils are equal, round, and reactive to light  Cardiovascular:      Rate and Rhythm: Normal rate  Pulmonary:      Effort: Pulmonary effort is normal  No respiratory distress  Neurological:      General: No focal deficit present  Mental Status: She is alert  Psychiatric:         Mood and Affect: Mood normal        Sherif Fnoseca MD

## 2023-03-21 NOTE — ASSESSMENT & PLAN NOTE
H/o frequent unilateral throbbing headaches, assoc wt photophobia, photophobia, nausea most likely 2/2 migraine, r/o tension  Positive history of migraine in father  Has tried prophylactic therapy propranolol wo improvement  Given 1 episode that occurred today in the last several months, will hold off prophylactic therapy at this time  Advised Tylenol prn and naproxen ordered  Ct zofran for nausea  Follow-up in 6 weeks

## 2023-03-21 NOTE — PATIENT INSTRUCTIONS
Migraine Headache   WHAT YOU NEED TO KNOW:   What is a migraine headache? A migraine is a severe headache  The pain can be so severe that it interferes with your daily activities  A migraine can last a few hours up to several days  The exact cause of migraines is not known  A family history of migraines increases your risk  Your risk is also higher if you are a woman or take medicines such as estrogen or a vasodilator  What are the warning signs that a migraine headache is about to start? Warning signs usually start 15 to 60 minutes before the headache:  Visual changes (auras), such as blurred vision, temporary blind or bright spots, lines, or hallucinations    Unusual tiredness or frequent yawning    Tingling in an arm or leg    What are the signs and symptoms of a migraine headache? A migraine headache usually begins as a dull ache around the eye or temple  The pain may get worse with movement  You may also have the following:  Pain in your head that may increase to the point that you cannot do everyday activities    Pain on one or both sides of your head    Throbbing, pulsing, or pounding pain in your head    Nausea and vomiting    Sensitivity to light, noise, or smells    What can trigger a migraine headache? Stress, eye strain, oversleeping, or not getting enough sleep    Hormone changes in women from birth control pills, pregnancy, menopause, or during a monthly period    Skipping meals, going too long without eating, or not drinking enough liquids    Certain foods or drinks such as chocolate, hard cheese, alcohol, or drinks that contain caffeine    Foods that contain gluten, nitrates, MSG, or artificial sweeteners    Sunlight, bright or flashing lights, loud noises, smoke, or strong smells    Heat, humidity, or changes in the weather    How is a migraine headache diagnosed? Your healthcare provider will ask about your headaches  Describe the pain and any other symptoms, such as nausea   Tell the provider if you think anything triggered the pain  The provider will also want to know what you ate and drank before the pain started  Tell the provider about any medical conditions you have or that run in your family  Include any recent stressors you have had  You may also need any of the following:  A neurologic exam  is used to check how your pupils react to light  Your healthcare provider may check your memory, hand grasp, and balance  CT or MRI pictures  may be taken of your brain  You may be given contrast liquid to help your brain show up better in the pictures  Tell the healthcare provider if you have ever had an allergic reaction to contrast liquid  Do not enter the MRI room with anything metal  Metal can cause serious injury  Tell the healthcare provider if you have any metal in or on your body  How is a migraine headache treated? Migraines cannot be cured  The goal of treatment is to reduce your symptoms  Medicines  may be given to help you manage migraines  Take medicine as soon as you feel a migraine begin, or as directed  Your healthcare provider may recommend any of the following:    Migraine medicines  are used to help prevent or stop a migraine  NSAIDs  help decrease swelling and pain or fever  This medicine is available with or without a doctor's order  NSAIDs can cause stomach bleeding or kidney problems in certain people  If you take blood thinner medicine, always ask your healthcare provider if NSAIDs are safe for you  Always read the medicine label and follow directions  Acetaminophen  decreases pain and fever  It is available without a doctor's order  Ask how much to take and how often to take it  Follow directions  Read the labels of all other medicines you are using to see if they also contain acetaminophen, or ask your doctor or pharmacist  Acetaminophen can cause liver damage if not taken correctly  Prescription pain medicine  may be given   Ask your healthcare provider how to take this medicine safely  Some prescription pain medicines contain acetaminophen  Do not take other medicines that contain acetaminophen without talking to your healthcare provider  Too much acetaminophen may cause liver damage  Prescription pain medicine may cause constipation  Ask your healthcare provider how to prevent or treat constipation  Antinausea medicine  may be given to calm your stomach and to help prevent vomiting  This medicine can also help relieve pain  Cognitive behavior therapy (CBT)  can help you learn ways to manage and prevent migraines  A therapist can teach you to relax and to reduce stress  You may learn ways to create healthy nutrition, activity, and sleep habits to prevent migraines  The therapist can also help you manage conditions that can affect migraines, such as anxiety or depression  What can I do to manage my symptoms? Rest in a dark, quiet room  This will help decrease your pain  Sleep may also help relieve the pain  Apply ice to decrease pain  Use an ice pack, or put crushed ice in a plastic bag  Cover the ice pack with a towel and place it on your head  Apply ice for 15 to 20 minutes every hour  Apply heat to decrease pain and muscle spasms  Use a small towel dampened with warm water or a heating pad, or sit in a warm bath  Apply heat on the area for 20 to 30 minutes every 2 hours  You may alternate heat and ice  Keep a migraine record  Write down when your migraines start and stop  Include your symptoms and what you were doing when a migraine began  Record what you ate or drank for 24 hours before the migraine started  Keep track of what you did to treat your migraine and if it worked  Bring the migraine record with you to visits with your healthcare provider  What can I do to prevent another migraine headache? Prevent a medicine overuse headache  Take pain medicines only as long as directed   A medicine may be limited to a certain amount each month  Your healthcare provider can help you create a plan so you get a safe amount each month  Do not smoke  Nicotine and other chemicals in cigarettes and cigars can trigger a migraine or make it worse  Ask your healthcare provider for information if you currently smoke and need help to quit  E-cigarettes or smokeless tobacco still contain nicotine  Talk to your healthcare provider before you use these products  Do not drink alcohol  Alcohol can trigger a migraine  It can also keep medicines used to treat your migraines from working  Be physically active  Physical activity, such as exercise, may help prevent migraines  Talk to your healthcare provider about the best activity plan for you  Try to get at least 30 minutes of physical activity on most days  Manage stress  Stress may trigger a migraine  Learn new ways to relax, such as deep breathing  Create a sleep schedule  Go to bed and get up at the same times each day  Do not watch television before bed  Eat a variety of healthy foods  Include healthy foods such as include fruit, vegetables, whole-grain breads, low-fat dairy products, beans, lean meat, and fish  Do not have food or drinks that trigger your migraines  Drink more liquids to prevent dehydration  Your healthcare provider can tell you how much liquid to drink each day and which liquids are best for you  Call your local emergency number (911 in the [address] [place] [address] [address]) or have someone call if:   You feel like you are going to faint, you become confused, or you have a seizure  When should I seek immediate care? You have a headache that seems different or much worse than your usual migraine headache  You have a severe headache with a fever or a stiff neck  You have new problems with speech, vision, balance, or movement  When should I call my doctor? Your migraines interfere with your daily activities  Your medicines or treatments stop working      You have questions or concerns about your condition or care  CARE AGREEMENT:   You have the right to help plan your care  Learn about your health condition and how it may be treated  Discuss treatment options with your healthcare providers to decide what care you want to receive  You always have the right to refuse treatment  The above information is an  only  It is not intended as medical advice for individual conditions or treatments  Talk to your doctor, nurse or pharmacist before following any medical regimen to see if it is safe and effective for you  © Copyright [employer] 2022 Information is for End User's use only and may not be sold, redistributed or otherwise used for commercial purposes

## 2023-05-02 DIAGNOSIS — G43.809 OTHER MIGRAINE WITHOUT STATUS MIGRAINOSUS, NOT INTRACTABLE: ICD-10-CM

## 2023-05-04 RX ORDER — NAPROXEN 500 MG/1
TABLET ORAL
Qty: 60 TABLET | Refills: 0 | Status: SHIPPED | OUTPATIENT
Start: 2023-05-04

## 2023-06-07 ENCOUNTER — OFFICE VISIT (OUTPATIENT)
Dept: OBGYN CLINIC | Facility: CLINIC | Age: 33
End: 2023-06-07

## 2023-06-07 VITALS
DIASTOLIC BLOOD PRESSURE: 70 MMHG | BODY MASS INDEX: 27.87 KG/M2 | WEIGHT: 173.4 LBS | HEART RATE: 90 BPM | HEIGHT: 66 IN | SYSTOLIC BLOOD PRESSURE: 127 MMHG

## 2023-06-07 DIAGNOSIS — R30.0 DYSURIA: Primary | ICD-10-CM

## 2023-06-07 DIAGNOSIS — N76.0 BACTERIAL VAGINOSIS: ICD-10-CM

## 2023-06-07 DIAGNOSIS — B96.89 BACTERIAL VAGINOSIS: ICD-10-CM

## 2023-06-07 LAB
BV WHIFF TEST VAG QL: POSITIVE
CLUE CELLS SPEC QL WET PREP: POSITIVE
PH SMN: 4.5 [PH]
SL AMB  POCT GLUCOSE, UA: NEGATIVE
SL AMB LEUKOCYTE ESTERASE,UA: NEGATIVE
SL AMB POCT BILIRUBIN,UA: NEGATIVE
SL AMB POCT BLOOD,UA: NEGATIVE
SL AMB POCT CLARITY,UA: CLEAR
SL AMB POCT COLOR,UA: NORMAL
SL AMB POCT KETONES,UA: NEGATIVE
SL AMB POCT NITRITE,UA: NEGATIVE
SL AMB POCT PH,UA: 7.5
SL AMB POCT SPECIFIC GRAVITY,UA: 1
SL AMB POCT URINE PROTEIN: NEGATIVE
SL AMB POCT UROBILINOGEN: NEGATIVE
T VAGINALIS VAG QL WET PREP: ABNORMAL
YEAST VAG QL WET PREP: ABNORMAL

## 2023-06-07 PROCEDURE — 99213 OFFICE O/P EST LOW 20 MIN: CPT | Performed by: OBSTETRICS & GYNECOLOGY

## 2023-06-07 PROCEDURE — 81002 URINALYSIS NONAUTO W/O SCOPE: CPT | Performed by: OBSTETRICS & GYNECOLOGY

## 2023-06-07 PROCEDURE — 87210 SMEAR WET MOUNT SALINE/INK: CPT | Performed by: OBSTETRICS & GYNECOLOGY

## 2023-06-07 PROCEDURE — 87086 URINE CULTURE/COLONY COUNT: CPT | Performed by: OBSTETRICS & GYNECOLOGY

## 2023-06-07 RX ORDER — BENZONATATE 200 MG/1
CAPSULE ORAL
COMMUNITY
Start: 2023-05-04

## 2023-06-07 RX ORDER — MECLIZINE HCL 12.5 MG/1
TABLET ORAL
COMMUNITY
Start: 2023-06-04

## 2023-06-07 RX ORDER — ARIPIPRAZOLE 5 MG/1
5 TABLET ORAL DAILY
COMMUNITY
Start: 2023-05-01

## 2023-06-07 RX ORDER — OXYCODONE HYDROCHLORIDE 5 MG/1
5 CAPSULE ORAL EVERY 4 HOURS PRN
COMMUNITY
Start: 2023-06-07

## 2023-06-07 RX ORDER — CYCLOBENZAPRINE HCL 10 MG
10 TABLET ORAL 3 TIMES DAILY
COMMUNITY
Start: 2023-06-04

## 2023-06-07 RX ORDER — SULFAMETHOXAZOLE AND TRIMETHOPRIM 800; 160 MG/1; MG/1
1 TABLET ORAL EVERY 12 HOURS SCHEDULED
Qty: 6 TABLET | Refills: 0 | Status: SHIPPED | OUTPATIENT
Start: 2023-06-07 | End: 2023-06-10

## 2023-06-07 RX ORDER — CYCLOBENZAPRINE HCL 10 MG
10 TABLET ORAL 3 TIMES DAILY
COMMUNITY
Start: 2023-06-04 | End: 2023-06-14

## 2023-06-07 RX ORDER — DIAZEPAM 5 MG/1
5 TABLET ORAL EVERY 8 HOURS PRN
COMMUNITY
Start: 2023-06-04

## 2023-06-07 RX ORDER — METRONIDAZOLE 500 MG/1
500 TABLET ORAL EVERY 12 HOURS SCHEDULED
Qty: 14 TABLET | Refills: 0 | Status: SHIPPED | OUTPATIENT
Start: 2023-06-07 | End: 2023-06-14

## 2023-06-07 RX ORDER — GABAPENTIN 400 MG/1
400 CAPSULE ORAL 3 TIMES DAILY
COMMUNITY
Start: 2023-05-16

## 2023-06-07 NOTE — PROGRESS NOTES
"OB/GYN VISIT  Naomi De Luna  6/7/2023  2:02 PM      Assessment/Plan:    Naomi De Luna is a 35 y o  [id number] female with bacterial vaginosis and concern for UTI  Bacterial Vaginosis   Plan for treatment with flagyl for 7 days   Discussed if symptoms are not resolving to call  Also discussed appropriate vulvar and vaginal hygiene     Dysuria   Concern for UTI based on symptoms- dysuria, increase frequency, cloudiness of urine and malodorous smell   Urine culture sent   Will empirically treat with Bactrim BID for 3 days   If she has fever, chills, worsening nausea, vomiting, abdominal or flank pain this is concerning for pyelonephritis and she should be evaluated in the ED     F/u in 1-2 months for annual     Subjective:     Naomi De Luna is a 35 y o  [id number] female who presents for evaluation of vaginal discharge and dysuria  Patient states that 3 days ago she began having worsening dysuria and frequency of urination  She also reports malodorous and cloudy urine  She denies fever, chills, worsening nausea, or vomiting  Additionally, she reports worsening yellow vaginal discharge that is foul smelling  She is not sexually active  She denies vaginal or vulvar itching and irritation  She does not use pH balancing serums or douches  Objective:    Vitals: Blood pressure 127/70, pulse 90, height 5' 6\" (1 676 m), weight 78 7 kg (173 lb 6 4 oz), not currently breastfeeding  Body mass index is 27 99 kg/m²      Past Medical History:   Diagnosis Date   • Abnormal Pap smear of cervix    • Anxiety    • Depression    • History of positive PPD    • Hyperlipidemia    • Menorrhagia    • Pelvic kidney     left side   • PTSD (post-traumatic stress disorder)    • STD (sexually transmitted disease)    • Thrombosis of ovarian vein      Past Surgical History:   Procedure Laterality Date   • APPENDECTOMY     • CYSTOSCOPY N/A 12/19/2022    Procedure: CYSTOSCOPY, INSERTION URETERAL CATHETERS;  Surgeon: Flex Newsome" Martha Alvarado MD;  [facility];  Service: Gynecology Oncology   • NC COLPOSCOPY CERVIX VAG LOOP ELTRD BX CERVIX N/A 7/31/2020    Procedure: BIOPSY CONE/COLD KNIFE CERVIX, ECC;  Surgeon: New Velazco MD;  [facility];  Service: Gynecology   • NC LAPS TOTAL HYSTERECT 250 GM/< W/RMVL TUBE/OVARY N/A 12/19/2022    Procedure: HYSTERECTOMY LAPAROSCOPIC TOTAL ([address]) W/ ROBOTICS, BILATERAL SALPINGECTOMY;  Surgeon: Doc Mann MD;  [facility];  Service: Gynecology Oncology       Physical Exam  Constitutional:       Appearance: Normal appearance  Cardiovascular:      Rate and Rhythm: Normal rate and regular rhythm  Pulmonary:      Effort: Pulmonary effort is normal    Abdominal:      Palpations: Abdomen is soft  Tenderness: There is no abdominal tenderness  There is no guarding or rebound  Genitourinary:     Comments: Normal external female genitalia   On speculum exam, copious yellow vaginal discharge   Vaginal cuff is intact, no bleeding or dehiscence   On bimanual exam, cuff is intact   Musculoskeletal:      Right lower leg: No edema  Left lower leg: No edema  Neurological:      General: No focal deficit present  Mental Status: She is alert  Mental status is at baseline         Recent Results (from the past 24 hour(s))   POCT urine dip    Collection Time: 06/07/23  2:17 PM   Result Value Ref Range    LEUKOCYTE ESTERASE,UA negative     NITRITE,UA negative     SL AMB POCT UROBILINOGEN negative     POCT URINE PROTEIN negative      PH,UA 7 5     BLOOD,UA negative     SPECIFIC GRAVITY,UA 1 000     KETONES,UA negative     BILIRUBIN,UA negative     GLUCOSE, UA negative      COLOR,UA dark yellow     CLARITY,UA clear    POCT wet mount    Collection Time: 06/07/23  2:28 PM   Result Value Ref Range    Yeast, Wet Prep neg     pH 4 5     Whiff Test positive     Clue Cells positive     Trich, Wet Prep neg        Tracy Rivas MD  6/7/2023  2:02 PM

## 2023-06-09 LAB
BACTERIA UR CULT: ABNORMAL
BACTERIA UR CULT: ABNORMAL

## 2023-06-30 DIAGNOSIS — F41.1 GAD (GENERALIZED ANXIETY DISORDER): ICD-10-CM

## 2023-06-30 RX ORDER — HYDROXYZINE 50 MG/1
25 TABLET, FILM COATED ORAL EVERY 6 HOURS PRN
Qty: 90 TABLET | Refills: 1 | Status: SHIPPED | OUTPATIENT
Start: 2023-06-30 | End: 2023-07-30

## 2023-07-29 DIAGNOSIS — F41.1 GAD (GENERALIZED ANXIETY DISORDER): ICD-10-CM

## 2023-08-01 RX ORDER — HYDROXYZINE 50 MG/1
25 TABLET, FILM COATED ORAL EVERY 6 HOURS PRN
Qty: 180 TABLET | Refills: 1 | Status: SHIPPED | OUTPATIENT
Start: 2023-08-01 | End: 2024-01-28

## 2023-08-03 ENCOUNTER — ANNUAL EXAM (OUTPATIENT)
Dept: OBGYN CLINIC | Facility: CLINIC | Age: 33
End: 2023-08-03

## 2023-08-03 VITALS
SYSTOLIC BLOOD PRESSURE: 108 MMHG | HEIGHT: 66 IN | BODY MASS INDEX: 27.58 KG/M2 | HEART RATE: 80 BPM | WEIGHT: 171.6 LBS | DIASTOLIC BLOOD PRESSURE: 74 MMHG

## 2023-08-03 DIAGNOSIS — Z11.3 SCREEN FOR STD (SEXUALLY TRANSMITTED DISEASE): Primary | ICD-10-CM

## 2023-08-03 DIAGNOSIS — Z01.419 WOMEN'S ANNUAL ROUTINE GYNECOLOGICAL EXAMINATION: ICD-10-CM

## 2023-08-03 DIAGNOSIS — Z12.39 ENCOUNTER FOR BREAST CANCER SCREENING USING NON-MAMMOGRAM MODALITY: ICD-10-CM

## 2023-08-03 PROCEDURE — 99395 PREV VISIT EST AGE 18-39: CPT | Performed by: NURSE PRACTITIONER

## 2023-08-03 PROCEDURE — 87591 N.GONORRHOEAE DNA AMP PROB: CPT | Performed by: NURSE PRACTITIONER

## 2023-08-03 PROCEDURE — 87491 CHLMYD TRACH DNA AMP PROBE: CPT | Performed by: NURSE PRACTITIONER

## 2023-08-03 NOTE — PROGRESS NOTES
ANNUAL GYNECOLOGICAL EXAMINATION    Janiya Mixon is a 35 y.o. female who presents today for annual GYN exam.  Her last pap smear was performed 2022 and result was NILM, HR-HPV negative. She has a history of abnormal pap smears in her past, she had a cone biopsy in  resulting CIN3 with negative margins. She had TLH in  for AUB. No LMP recorded (lmp unknown). Patient has had a hysterectomy. She reports that she is currently in a toxic relationship and partner has been unfaithful. She reports that recently she has noticed a foul odor during/sfter intercourse.  Her general medical history has been reviewed and she reports it as follows:    Past Medical History:   Diagnosis Date   • Abnormal Pap smear of cervix    • Anxiety    • Depression    • History of positive PPD    • Hyperlipidemia    • Menorrhagia    • Pelvic kidney     left side   • PTSD (post-traumatic stress disorder)    • STD (sexually transmitted disease)    • Thrombosis of ovarian vein      Past Surgical History:   Procedure Laterality Date   • APPENDECTOMY     • CYSTOSCOPY N/A 2022    Procedure: Deleta Last URETERAL CATHETERS;  Surgeon: Mac Naranjo MD;  [facility];  Service: Gynecology Oncology   • ND COLPOSCOPY CERVIX VAG LOOP ELTRD  [address] N/A 2020    Procedure: BIOPSY CONE/COLD KNIFE CERVIX, [address];  Surgeon: Simran Thomas MD;  [facility];  Service: Gynecology   • ND LAPS TOTAL HYSTERECT 250 GM/< W/RMVL TUBE/OVARY N/A 2022    Procedure: HYSTERECTOMY LAPAROSCOPIC TOTAL ([address]) W/ ROBOTICS, BILATERAL SALPINGECTOMY;  Surgeon: Mac Naranjo MD;  [facility];  Service: Gynecology Oncology     OB History        4    Para   4    Term   3       1    AB        Living   4       SAB        IAB        Ectopic        Multiple   0    Live Births   4               Social History     Tobacco Use   • Smoking status: Some Days     Packs/day: 0.25     Years: 10.00 Total pack years: 2.50     Types: Cigarettes   • Smokeless tobacco: Never   • Tobacco comments:     4 cig / day    Vaping Use   • Vaping Use: Never used   Substance Use Topics   • Alcohol use: Yes     Alcohol/week: 24.0 standard drinks of alcohol     Types: 24 Standard drinks or equivalent per week   • Drug use: No     Social History     Substance and Sexual Activity   Sexual Activity Yes   • Partners: Male   • Birth control/protection: Female Sterilization     Cancer-related family history is not on file. Current Outpatient Medications   Medication Instructions   • ARIPiprazole (ABILIFY) 5 mg, Oral, Daily   • benzonatate (TESSALON) 200 MG capsule TAKE 1 CAPSULE BY MOUTH THREE TIMES A DAY AS NEEDED FOR COUGH   • buPROPion (WELLBUTRIN SR) 100 mg, Daily after breakfast   • cyclobenzaprine (FLEXERIL) 10 mg, 3 times daily   • diazepam (VALIUM) 5 mg, Oral, Every 8 hours PRN   • gabapentin (NEURONTIN) 400 mg, Oral, 3 times daily   • hydrOXYzine HCL (ATARAX) 25 mg, Oral, Every 6 hours PRN   • meclizine (ANTIVERT) 12.5 MG tablet TAKE 1 TABLET (12.5 MG TOTAL) BY MOUTH EVERY 8 (EIGHT) HOURS AS NEEDED FOR DIZZINESS. • naproxen (NAPROSYN) 500 mg tablet TAKE 1 TABLET BY MOUTH TWICE A DAY WITH MEALS   • ondansetron (ZOFRAN) 4 mg, Oral, Every 6 hours   • ondansetron (ZOFRAN-ODT) 4 mg disintegrating tablet TAKE 1 TABLET BY MOUTH EVERY 8 HOURS AS NEEDED FOR NAUSEA AND VOMITING   • oxyCODONE (OXY-IR) 5 mg, Every 4 hours PRN   • QUEtiapine (SEROQUEL) 25 mg, Daily at bedtime   • sertraline (ZOLOFT) 200 mg, Oral, Daily at bedtime   • venlafaxine (EFFEXOR-XR) 75 mg, Every morning   • Xarelto 20 MG tablet TAKE 1 TABLET BY MOUTH DAILY WITH BREAKFAST       Review of Systems:  Review of Systems   Constitutional: Negative. Gastrointestinal: Negative. Genitourinary: Negative. Skin: Negative.         Physical Exam:  /74 (BP Location: Right arm)   Pulse 80   Ht 5' 6" (1.676 m)   Wt 77.8 kg (171 lb 9.6 oz)   LMP  (LMP Unknown)   BMI 27.70 kg/m²   Physical Exam  Constitutional:       General: She is not in acute distress. Appearance: Normal appearance. Genitourinary:      Vulva and bladder normal.      No lesions in the vagina. Vaginal cuff intact. No vaginal erythema or ulceration. Right Adnexa: not tender and no mass present. Left Adnexa: not tender and no mass present. Cervix is absent. Uterus is absent. Breasts:     Right: No mass, nipple discharge or skin change. Left: No mass, nipple discharge or skin change. Cardiovascular:      Rate and Rhythm: Normal rate and regular rhythm. Pulmonary:      Effort: Pulmonary effort is normal.      Breath sounds: Normal breath sounds. Abdominal:      General: Abdomen is flat. Palpations: Abdomen is soft. Musculoskeletal:      Cervical back: Neck supple. Neurological:      Mental Status: She is alert. Skin:     General: Skin is warm and dry. Psychiatric:         Mood and Affect: Mood normal.         Behavior: Behavior normal.   Vitals reviewed. Assessment/Plan:   1. Normal well-woman GYN exam.  2. Cervical cancer screening:  Normal vaginal cuff. Pap smear up to date. Will continue to need vaginal pap smears until 2040 (MESFIN 3 in 2020). 3. STD screening: Orders placed for vaginal GC/CT cultures. Orders placed for serum anti-HIV, anti-HCV, HbsAg, syphilis panel. 4. Breast cancer screening:  Normal breast exam. Reviewed breast self-awareness. 5. Depression Screening: Patient's depression screening was assessed with a PHQ-9 score was 13. Continue regular follow-up with their psychologist/therapist/psychiatrist who is managing their mental health condition(s). 6. BMI Counseling: Body mass index is 27.7 kg/m². Discussed the patient's BMI with her. The BMI is under 30 and does not require intervention. 7. No evidence of yeast/BV infection. Will screen for STIs.  Reviewed vulvar skin care measures and vaginitis prevention measures. 8. Return to office in one year for annual or sooner if needed. Reviewed with patient that test results are available in [facility] immediately, but that they will not necessarily be reviewed by me immediately. Explained that I will review results at my earliest opportunity and contact patient appropriately.

## 2023-08-04 LAB
C TRACH DNA SPEC QL NAA+PROBE: NEGATIVE
N GONORRHOEA DNA SPEC QL NAA+PROBE: NEGATIVE

## 2023-08-09 ENCOUNTER — TELEPHONE (OUTPATIENT)
Dept: OBGYN CLINIC | Facility: CLINIC | Age: 33
End: 2023-08-09

## 2023-09-01 ENCOUNTER — HOSPITAL ENCOUNTER (EMERGENCY)
Facility: HOSPITAL | Age: 33
Discharge: HOME | End: 2023-09-01
Attending: EMERGENCY MEDICINE
Payer: MEDICARE

## 2023-09-01 VITALS
OXYGEN SATURATION: 99 % | RESPIRATION RATE: 16 BRPM | SYSTOLIC BLOOD PRESSURE: 135 MMHG | TEMPERATURE: 97.2 F | HEART RATE: 84 BPM | DIASTOLIC BLOOD PRESSURE: 60 MMHG

## 2023-09-01 DIAGNOSIS — G43.909 MIGRAINE: Primary | ICD-10-CM

## 2023-09-01 PROCEDURE — 96375 TX/PRO/DX INJ NEW DRUG ADDON: CPT

## 2023-09-01 PROCEDURE — 96365 THER/PROPH/DIAG IV INF INIT: CPT

## 2023-09-01 PROCEDURE — 99282 EMERGENCY DEPT VISIT SF MDM: CPT

## 2023-09-01 PROCEDURE — 99284 EMERGENCY DEPT VISIT MOD MDM: CPT | Performed by: EMERGENCY MEDICINE

## 2023-09-01 PROCEDURE — 96366 THER/PROPH/DIAG IV INF ADDON: CPT

## 2023-09-01 RX ORDER — ONDANSETRON 2 MG/ML
4 INJECTION INTRAMUSCULAR; INTRAVENOUS ONCE
Status: COMPLETED | OUTPATIENT
Start: 2023-09-01 | End: 2023-09-01

## 2023-09-01 RX ORDER — MAGNESIUM SULFATE HEPTAHYDRATE 40 MG/ML
2 INJECTION, SOLUTION INTRAVENOUS ONCE
Status: COMPLETED | OUTPATIENT
Start: 2023-09-01 | End: 2023-09-01

## 2023-09-01 RX ORDER — KETOROLAC TROMETHAMINE 30 MG/ML
15 INJECTION, SOLUTION INTRAMUSCULAR; INTRAVENOUS ONCE
Status: COMPLETED | OUTPATIENT
Start: 2023-09-01 | End: 2023-09-01

## 2023-09-01 RX ORDER — ACETAMINOPHEN 325 MG/1
650 TABLET ORAL ONCE
Status: COMPLETED | OUTPATIENT
Start: 2023-09-01 | End: 2023-09-01

## 2023-09-01 RX ORDER — DROPERIDOL 2.5 MG/ML
0.62 INJECTION, SOLUTION INTRAMUSCULAR; INTRAVENOUS ONCE
Status: COMPLETED | OUTPATIENT
Start: 2023-09-01 | End: 2023-09-01

## 2023-09-01 RX ADMIN — DROPERIDOL 0.62 MG: 2.5 INJECTION, SOLUTION INTRAMUSCULAR; INTRAVENOUS at 03:53

## 2023-09-01 RX ADMIN — ONDANSETRON 4 MG: 2 INJECTION INTRAMUSCULAR; INTRAVENOUS at 02:21

## 2023-09-01 RX ADMIN — KETOROLAC TROMETHAMINE 15 MG: 30 INJECTION, SOLUTION INTRAMUSCULAR; INTRAVENOUS at 02:21

## 2023-09-01 RX ADMIN — ACETAMINOPHEN 650 MG: 325 TABLET, FILM COATED ORAL at 02:21

## 2023-09-01 RX ADMIN — MAGNESIUM SULFATE HEPTAHYDRATE 2 G: 40 INJECTION, SOLUTION INTRAVENOUS at 02:21

## 2023-09-01 NOTE — ED ATTENDING ATTESTATION
9/1/2023  IRegino MD, saw and evaluated the patient. I have discussed the patient with the resident/non-physician practitioner and agree with the resident's/non-physician practitioner's findings, Plan of Care, and MDM as documented in the resident's/non-physician practitioner's note, except where noted. All available labs and Radiology studies were reviewed. I was present for key portions of any procedure(s) performed by the resident/non-physician practitioner and I was immediately available to provide assistance. At this point I agree with the current assessment done in the Emergency Department. I have conducted an independent evaluation of this patient a history and physical is as follows:    55-year-old female presents emergency department for evaluation of headache. Headache has been ongoing for the past few days. Was not thunderclap in nature. No head injury, trauma or fall. No blood thinners. Patient does have a history of cervical spine fusion in June. Headache feels similar to prior headaches. Is associated with some photophobia and nausea, but no vomiting. She also endorses some diminished sensation on the right side of her face. No slurred speech or facial droop. No weakness. No fevers or chills. On exam, patient uncomfortable appearing, but in no acute distress, head is normocephalic atraumatic, pupils equal round and reactive to light, neck is supple without meningismus signs, heart is regular rate and rhythm with intact distal pulses, no increased work of breathing, respiratory distress, or stridor. Cranial nerves II through XII grossly intact. No weakness in the extremities. MDM:  Headache: Headache is consistent with prior migraine headaches without any red flag symptoms, plan to treat with migraine cocktail and reassess. Symptoms improved following medications. Plan to discharge home.       ED Course         Critical Care Time  Procedures

## 2023-09-01 NOTE — ED PROVIDER NOTES
History  Chief Complaint   Patient presents with   • Migraine     Pt states she has had a migraine for the last three days. Pt states 7/10 HA with neck pain, nausea, and photosensitivity. Hx of migraines. HPI  Patient is 77-year-old female presenting for concerns of migraine headache for the last 3 days. Past medical history significant for C6-C7 C-spine fusion in June 2023 and history of migraines. Patient states that symptoms include bandlike pain wrapping around the front of her head, photophobia, with some paresthesias in the right side of her face. Patient denies any lateralizing weakness, changes in vision, lightheadedness, dizziness, trauma or falls, neck pain, neck stiffness, thunderclap headache. Prior to Admission Medications   Prescriptions Last Dose Informant Patient Reported? Taking?    ARIPiprazole (ABILIFY) 5 mg tablet   Yes No   Sig: Take 5 mg by mouth daily   QUEtiapine (SEROquel) 25 mg tablet  Self Yes No   Sig: Take 25 mg by mouth daily at bedtime   Patient not taking: Reported on 8/3/2023   Xarelto 20 MG tablet   No No   Sig: TAKE 1 TABLET BY MOUTH DAILY WITH BREAKFAST   Patient not taking: Reported on 6/7/2023   benzonatate (TESSALON) 200 MG capsule   Yes No   Sig: TAKE 1 CAPSULE BY MOUTH THREE TIMES A DAY AS NEEDED FOR COUGH   buPROPion (WELLBUTRIN SR) 100 mg 12 hr tablet  Self Yes No   Sig: Take 100 mg by mouth daily after breakfast   Patient not taking: Reported on 6/7/2023   cyclobenzaprine (FLEXERIL) 10 mg tablet   Yes No   Sig: Take 10 mg by mouth 3 (three) times a day   Patient not taking: Reported on 8/3/2023   diazepam (VALIUM) 5 mg tablet   Yes No   Sig: Take 5 mg by mouth every 8 (eight) hours as needed   gabapentin (NEURONTIN) 400 mg capsule   Yes No   Sig: Take 400 mg by mouth 3 (three) times a day   hydrOXYzine HCL (ATARAX) 50 mg tablet   No No   Sig: TAKE 0.5 TABLETS (25 MG TOTAL) BY MOUTH EVERY 6 (SIX) HOURS AS NEEDED FOR ANXIETY   meclizine (ANTIVERT) 12.5 MG tablet Yes No   Sig: TAKE 1 TABLET (12.5 MG TOTAL) BY MOUTH EVERY 8 (EIGHT) HOURS AS NEEDED FOR DIZZINESS.    Patient not taking: Reported on 8/3/2023   naproxen (NAPROSYN) 500 mg tablet   No No   Sig: TAKE 1 TABLET BY MOUTH TWICE A DAY WITH MEALS   Patient not taking: Reported on 6/7/2023   ondansetron (ZOFRAN) 4 mg tablet  Self No No   Sig: Take 1 tablet (4 mg total) by mouth every 6 (six) hours   Patient not taking: Reported on 6/7/2023   ondansetron (ZOFRAN-ODT) 4 mg disintegrating tablet  Self Yes No   Sig: TAKE 1 TABLET BY MOUTH EVERY 8 HOURS AS NEEDED FOR NAUSEA AND VOMITING   Patient not taking: Reported on 6/7/2023   oxyCODONE (OXY-IR) 5 MG capsule   Yes No   Sig: Take 5 mg by mouth every 4 (four) hours as needed   Patient not taking: Reported on 8/3/2023   sertraline (ZOLOFT) 100 mg tablet  Self No No   Sig: TAKE 2 TABLETS (200 MG TOTAL) BY MOUTH DAILY AT BEDTIME   Patient not taking: Reported on 6/7/2023   venlafaxine (EFFEXOR-XR) 37.5 mg 24 hr capsule  Self Yes No   Sig: Take 75 mg by mouth every morning   Patient not taking: Reported on 8/3/2023      Facility-Administered Medications: None       Past Medical History:   Diagnosis Date   • Abnormal Pap smear of cervix    • Anxiety    • Depression    • History of positive PPD    • Hyperlipidemia    • Menorrhagia    • Pelvic kidney     left side   • PTSD (post-traumatic stress disorder)    • STD (sexually transmitted disease)    • Thrombosis of ovarian vein        Past Surgical History:   Procedure Laterality Date   • APPENDECTOMY     • CYSTOSCOPY N/A 12/19/2022    Procedure: [place] [facility] URETERAL CATHETERS;  Surgeon: Jemma Ortega MD;  [facility];  Service: Gynecology Oncology   • ME COLPOSCOPY CERVIX VAG LOOP ELTRD BX CERVIX N/A 7/31/2020    Procedure: BIOPSY CONE/COLD KNIFE CERVIX, [address];  Surgeon: Abdi Romano MD;  Location[facility];  Service: Gynecology   • ME LAPS TOTAL HYSTERECT 250 GM/< W/RMVL TUBE/OVARY N/A 12/19/2022 Procedure: HYSTERECTOMY LAPAROSCOPIC TOTAL ([address]) W/ ROBOTICS, BILATERAL SALPINGECTOMY;  Surgeon: Gosia Laguna MD;  Location[facility];  Service: Gynecology Oncology       Family History   Problem Relation Age of Onset   • Crohn's disease Brother    • Crohn's disease Maternal Aunt    • Diabetes Maternal Grandmother    • Clotting disorder Maternal Grandmother    • Hypertension Maternal Grandmother    • Diabetes Maternal Grandfather    • Diabetes Paternal Grandmother    • Diabetes Paternal Grandfather    • COPD Mother    • Diabetes Father    • Hypertension Father    • Depression Sister      I have reviewed and agree with the history as documented. E-Cigarette/Vaping   • E-Cigarette Use Never User      E-Cigarette/Vaping Substances   • Nicotine No    • THC No    • CBD No    • Flavoring No    • Other No    • Unknown No      Social History     Tobacco Use   • Smoking status: Some Days     Packs/day: 0.25     Years: 10.00     Total pack years: 2.50     Types: Cigarettes   • Smokeless tobacco: Never   • Tobacco comments:     4 cig / day    Vaping Use   • Vaping Use: Never used   Substance Use Topics   • Alcohol use: Yes     Alcohol/week: 24.0 standard drinks of alcohol     Types: 24 Standard drinks or equivalent per week   • Drug use: No        Review of Systems   Constitutional: Negative. HENT: Negative. Eyes: Negative. Respiratory: Negative. Cardiovascular: Negative. Gastrointestinal: Negative. Endocrine: Negative. Genitourinary: Negative. Musculoskeletal: Negative. Skin: Negative. Allergic/Immunologic: Negative. Neurological: Positive for headaches. Hematological: Negative. Psychiatric/Behavioral: Negative.         Physical Exam  ED Triage Vitals [09/01/23 0144]   Temperature Pulse Respirations Blood Pressure SpO2   (!) 97.2 °F (36.2 °C) 84 16 135/60 99 %      Temp Source Heart Rate Source Patient Position - Orthostatic VS BP Location FiO2 (%)   Temporal Monitor Sitting Left arm --      Pain Score       7             Orthostatic Vital Signs  Vitals:    09/01/23 0144   BP: 135/60   Pulse: 84   Patient Position - Orthostatic VS: Sitting       Physical Exam  Vitals and nursing note reviewed. Constitutional:       Appearance: Normal appearance. She is normal weight. HENT:      Head: Normocephalic and atraumatic. Right Ear: Tympanic membrane, ear canal and external ear normal.      Left Ear: Tympanic membrane, ear canal and external ear normal.      Nose: Nose normal.      Mouth/Throat:      Mouth: Mucous membranes are moist.      Pharynx: Oropharynx is clear. Eyes:      Extraocular Movements: Extraocular movements intact. Conjunctiva/sclera: Conjunctivae normal.      Pupils: Pupils are equal, round, and reactive to light. Cardiovascular:      Rate and Rhythm: Normal rate and regular rhythm. Pulses: Normal pulses. Heart sounds: Normal heart sounds. Pulmonary:      Effort: Pulmonary effort is normal.      Breath sounds: Normal breath sounds. Abdominal:      General: Abdomen is flat. Bowel sounds are normal.      Palpations: Abdomen is soft. Musculoskeletal:         General: Normal range of motion. Cervical back: Normal range of motion and neck supple. Skin:     General: Skin is warm and dry. Capillary Refill: Capillary refill takes less than 2 seconds. Neurological:      General: No focal deficit present. Mental Status: She is alert and oriented to person, place, and time. Cranial Nerves: No cranial nerve deficit. Sensory: No sensory deficit. Motor: No weakness. Coordination: Coordination normal.      Gait: Gait normal.      Deep Tendon Reflexes: Reflexes normal.   Psychiatric:         Mood and Affect: Mood normal.         Behavior: Behavior normal.         Thought Content:  Thought content normal.         Judgment: Judgment normal.         ED Medications  Medications   ondansetron (ZOFRAN) injection 4 mg (4 mg Intravenous Given 9/1/23 0221)   ketorolac (TORADOL) injection 15 mg (15 mg Intravenous Given 9/1/23 0221)   acetaminophen (TYLENOL) tablet 650 mg (650 mg Oral Given 9/1/23 0221)   magnesium sulfate 2 g/50 mL IVPB (premix) 2 g (0 g Intravenous Stopped 9/1/23 0412)   droperidol (INAPSINE) injection 0.625 mg (0.625 mg Intravenous Given 9/1/23 0353)       Diagnostic Studies  Results Reviewed     None                 No orders to display         Procedures  Procedures      ED Course                             SBIRT 22yo+    Flowsheet Row Most Recent Value   Initial Alcohol Screen: US AUDIT-C     1. How often do you have a drink containing alcohol? 0 Filed at: 09/01/2023 0146   2. How many drinks containing alcohol do you have on a typical day you are drinking? 0 Filed at: 09/01/2023 0146   3b. FEMALE Any Age, or MALE 65+: How often do you have 4 or more drinks on one occassion? 0 Filed at: 09/01/2023 0146   Audit-C Score 0 Filed at: 09/01/2023 1833   ЕКАТЕРИНА: How many times in the past year have you. .. Used an illegal drug or used a prescription medication for non-medical reasons? Never Filed at: 09/01/2023 0146                Medical Decision Making  Patient is a 70-year-old female presenting for concerns of migraine headache. DDx: Migraine headache  Based on patient presentation physical exam findings, primary concern for migraine headache. Will provide patient with migraine cocktail and droperidol. If symptoms improved full migraine cocktail plan for discharge at that time. However symptoms do not improve following migraine cocktail will plan for CT scan of head. -Patient states symptoms improved following droperidol administration migraine cocktail administration. We will plan for discharge at this time. Return precautions given. Ready for discharge. Migraine: acute illness or injury  Risk  OTC drugs. Prescription drug management.             Disposition  Final diagnoses:   Migraine     Time reflects when diagnosis was documented in both MDM as applicable and the Disposition within this note     Time User Action Codes Description Comment    9/1/2023  4:08 AM Ying Luisana Perez [P99.655] Migraine       ED Disposition     ED Disposition   Discharge    Condition   Stable    Date/Time   Fri Sep 1, 2023  4:08 AM    Comment   Adan Drivers discharge to home/self care.                Follow-up Information     Follow up With Specialties Details Why Contact Info Additional [facility] Emergency Department Emergency Medicine Go to  If symptoms worsen [address] [facility] Emergency Department, [address], [place], [place], [facility]          Discharge Medication List as of 9/1/2023  4:09 AM      CONTINUE these medications which have NOT CHANGED    Details   ARIPiprazole (ABILIFY) 5 mg tablet Take 5 mg by mouth daily, Starting Mon 5/1/2023, Historical Med      benzonatate (TESSALON) 200 MG capsule TAKE 1 CAPSULE BY MOUTH THREE TIMES A DAY AS NEEDED FOR COUGH, Historical Med      buPROPion (WELLBUTRIN SR) 100 mg 12 hr tablet Take 100 mg by mouth daily after breakfast, Historical Med      cyclobenzaprine (FLEXERIL) 10 mg tablet Take 10 mg by mouth 3 (three) times a day, Starting Sun 6/4/2023, Historical Med      diazepam (VALIUM) 5 mg tablet Take 5 mg by mouth every 8 (eight) hours as needed, Starting Sun 6/4/2023, Historical Med      gabapentin (NEURONTIN) 400 mg capsule Take 400 mg by mouth 3 (three) times a day, Starting Tue 5/16/2023, Historical Med      hydrOXYzine HCL (ATARAX) 50 mg tablet TAKE 0.5 TABLETS (25 MG TOTAL) BY MOUTH EVERY 6 (SIX) HOURS AS NEEDED FOR ANXIETY, Starting Tue 8/1/2023, Until Sun 1/28/2024 at 2359, Normal      meclizine (ANTIVERT) 12.5 MG tablet TAKE 1 TABLET (12.5 MG TOTAL) BY MOUTH EVERY 8 (EIGHT) HOURS AS NEEDED FOR DIZZINESS., Historical Med      naproxen (NAPROSYN) 500 mg tablet TAKE 1 TABLET BY MOUTH TWICE A DAY WITH MEALS, Normal      ondansetron (ZOFRAN) 4 mg tablet Take 1 tablet (4 mg total) by mouth every 6 (six) hours, Starting Mon 1/16/2023, Normal      ondansetron (ZOFRAN-ODT) 4 mg disintegrating tablet TAKE 1 TABLET BY MOUTH EVERY 8 HOURS AS NEEDED FOR NAUSEA AND VOMITING, Historical Med      oxyCODONE (OXY-IR) 5 MG capsule Take 5 mg by mouth every 4 (four) hours as needed, Starting Wed 6/7/2023, Historical Med      QUEtiapine (SEROquel) 25 mg tablet Take 25 mg by mouth daily at bedtime, Historical Med      sertraline (ZOLOFT) 100 mg tablet TAKE 2 TABLETS (200 MG TOTAL) BY MOUTH DAILY AT BEDTIME, Starting Mon 11/21/2022, Normal      venlafaxine (EFFEXOR-XR) 37.5 mg 24 hr capsule Take 75 mg by mouth every morning, Starting Wed 1/18/2023, Historical Med      Xarelto 20 MG tablet TAKE 1 TABLET BY MOUTH DAILY WITH BREAKFAST, Normal           No discharge procedures on file. PDMP Review       Value Time User    PDMP Reviewed  Yes 7/7/2022  6:03 PM Wendi Alarcon MD           ED Provider  Attending physically available and evaluated Zack Smith. I managed the patient along with the ED Attending.     Electronically Signed by         Stephanie Ruiz MD  09/01/23 7922

## 2023-09-18 ENCOUNTER — APPOINTMENT (EMERGENCY)
Dept: RADIOLOGY | Facility: HOSPITAL | Age: 33
End: 2023-09-18
Payer: MEDICARE

## 2023-09-18 ENCOUNTER — HOSPITAL ENCOUNTER (EMERGENCY)
Facility: HOSPITAL | Age: 33
Discharge: HOME | End: 2023-09-18
Attending: EMERGENCY MEDICINE
Payer: MEDICARE

## 2023-09-18 VITALS
OXYGEN SATURATION: 97 % | RESPIRATION RATE: 18 BRPM | DIASTOLIC BLOOD PRESSURE: 56 MMHG | SYSTOLIC BLOOD PRESSURE: 101 MMHG | TEMPERATURE: 97.9 F | HEART RATE: 72 BPM

## 2023-09-18 DIAGNOSIS — G43.909 MIGRAINE: Primary | ICD-10-CM

## 2023-09-18 PROCEDURE — 96365 THER/PROPH/DIAG IV INF INIT: CPT

## 2023-09-18 PROCEDURE — 99284 EMERGENCY DEPT VISIT MOD MDM: CPT

## 2023-09-18 PROCEDURE — G1004 CDSM NDSC: HCPCS

## 2023-09-18 PROCEDURE — 96375 TX/PRO/DX INJ NEW DRUG ADDON: CPT

## 2023-09-18 PROCEDURE — 70450 CT HEAD/BRAIN W/O DYE: CPT

## 2023-09-18 PROCEDURE — 99284 EMERGENCY DEPT VISIT MOD MDM: CPT | Performed by: EMERGENCY MEDICINE

## 2023-09-18 RX ORDER — KETOROLAC TROMETHAMINE 30 MG/ML
15 INJECTION, SOLUTION INTRAMUSCULAR; INTRAVENOUS ONCE
Status: COMPLETED | OUTPATIENT
Start: 2023-09-18 | End: 2023-09-18

## 2023-09-18 RX ORDER — DIPHENHYDRAMINE HYDROCHLORIDE 50 MG/ML
25 INJECTION INTRAMUSCULAR; INTRAVENOUS ONCE
Status: COMPLETED | OUTPATIENT
Start: 2023-09-18 | End: 2023-09-18

## 2023-09-18 RX ORDER — MAGNESIUM SULFATE HEPTAHYDRATE 40 MG/ML
2 INJECTION, SOLUTION INTRAVENOUS ONCE
Status: COMPLETED | OUTPATIENT
Start: 2023-09-18 | End: 2023-09-18

## 2023-09-18 RX ORDER — DROPERIDOL 2.5 MG/ML
0.62 INJECTION, SOLUTION INTRAMUSCULAR; INTRAVENOUS ONCE
Status: COMPLETED | OUTPATIENT
Start: 2023-09-18 | End: 2023-09-18

## 2023-09-18 RX ADMIN — KETOROLAC TROMETHAMINE 15 MG: 30 INJECTION, SOLUTION INTRAMUSCULAR; INTRAVENOUS at 12:45

## 2023-09-18 RX ADMIN — DIPHENHYDRAMINE HYDROCHLORIDE 25 MG: 50 INJECTION, SOLUTION INTRAMUSCULAR; INTRAVENOUS at 13:15

## 2023-09-18 RX ADMIN — DROPERIDOL 0.62 MG: 2.5 INJECTION, SOLUTION INTRAMUSCULAR; INTRAVENOUS at 12:45

## 2023-09-18 RX ADMIN — SODIUM CHLORIDE 1000 ML: 0.9 INJECTION, SOLUTION INTRAVENOUS at 12:44

## 2023-09-18 RX ADMIN — MAGNESIUM SULFATE HEPTAHYDRATE 2 G: 40 INJECTION, SOLUTION INTRAVENOUS at 12:45

## 2023-09-18 NOTE — ED NOTES
Pt reporting feeling hot on body and extremely anxious. Pt put on monitor and Dr Jessy Tolentino at bedside. Benadryl ordered.      Mitul Smith RN  09/18/23 5235

## 2023-09-18 NOTE — DISCHARGE INSTRUCTIONS
Your workup here was not concerning for anything dangerous. Therefore there is no need for you to stay at the hospital for further testing. We feel safe to send you home. You can use a Tylenol, Motrin for management of your symptoms. You should follow up with a Neurologist to assess for resolution of your symptoms and to determine if there is any further evaluation that needs to be performed. Return to the emergency department if you have any symptoms of worsening headaches or confusoin    Thank you for choosing [facility] for your care!

## 2023-09-18 NOTE — ED PROVIDER NOTES
Chief Complaint   Patient presents with   • Migraine     X 6 days. Ibuprofen with no relief. Reports ear pain, photo sensitivity neck stiffness and nausea. History of Present Illness and Review of Systems   This is a 35 y.o. female with PMH significant for PTSD, hyperlipidemia coming in today with complaint of migraine. The patient reports that she has been having a migraine on and off for the last several weeks. Reports that for started on September 1 and was evaluated in the emergency department. Treated without complication and discharge. She reports her symptoms have not abated since being evaluated. Also reports nausea, photophobia, and that her headaches have been waking her up in the middle the night. Reports a bandlike distribution. No traumatic injuries. Has never had anything like this prior to March. Had a recent cervical discectomy and fusion and hysterectomy in December but no other recent surgeries. Denies any family history of sudden death or car crashes or history of aneurysms. No fevers or chills. No chest pain or shortness of breath, no difficulty speaking swallowing or ambulating. No other symptoms currently. - No language barrier. No other complaints for this encounter. Remainder of ROS Reviewed and Non-Pertinent    Past Medical, Past Surgical History:    has a past medical history of Abnormal Pap smear of cervix, Anxiety, Depression, History of positive PPD, Hyperlipidemia, Menorrhagia, Pelvic kidney, PTSD (post-traumatic stress disorder), STD (sexually transmitted disease), and Thrombosis of ovarian vein. has a past surgical history that includes Appendectomy; pr colposcopy cervix vag loop eltrd bx cervix (N/A, 7/31/2020); pr laps total hysterect 250 gm/< w/rmvl tube/ovary (N/A, 12/19/2022); and CYSTOSCOPY (N/A, 12/19/2022). Allergies:      Allergies   Allergen Reactions   • Metoclopramide Hives       Social and Family History:     Social History     Substance and Sexual Activity   Alcohol Use Yes   • Alcohol/week: 24.0 standard drinks of alcohol   • Types: 24 Standard drinks or equivalent per week    Comment: socially     Social History     Tobacco Use   Smoking Status Some Days   • Packs/day: 0.25   • Years: 10.00   • Total pack years: 2.50   • Types: Cigarettes   Smokeless Tobacco Never   Tobacco Comments    4 cig / day      Social History     Substance and Sexual Activity   Drug Use No       Physical Examination     Vitals:    09/18/23 1046 09/18/23 1315 09/18/23 1330   BP: 164/66  101/56   BP Location: Right arm     Pulse: 69 82 72   Resp: 18 18 18   Temp: 97.9 °F (36.6 °C)     SpO2: 100% 98% 97%       Physical Exam  Vitals and nursing note reviewed. Constitutional:       General: She is not in acute distress. Appearance: She is well-developed. HENT:      Head: Normocephalic and atraumatic. Eyes:      Extraocular Movements:      Right eye: Normal extraocular motion and no nystagmus. Left eye: Normal extraocular motion and no nystagmus. Conjunctiva/sclera: Conjunctivae normal.      Comments: Photophobia present   Cardiovascular:      Rate and Rhythm: Normal rate and regular rhythm. Heart sounds: No murmur heard. Pulmonary:      Effort: Pulmonary effort is normal. No respiratory distress. Breath sounds: Normal breath sounds. Abdominal:      Palpations: Abdomen is soft. Tenderness: There is no abdominal tenderness. Musculoskeletal:         General: No swelling. Cervical back: Neck supple. Skin:     General: Skin is warm and dry. Capillary Refill: Capillary refill takes less than 2 seconds. Neurological:      General: No focal deficit present. Mental Status: She is alert. Cranial Nerves: No cranial nerve deficit. Sensory: No sensory deficit. Motor: No weakness.    Psychiatric:         Mood and Affect: Mood normal.           Procedures   Procedures      MDM:   Medical Decision Making  Nathaniel Leong Keaton Collins is a 35 y.o. who presents with complaints of migraine    Vital signs are stable, physical exam shows patient has notable photophobia however no neurologic deficits    Ddx: Overall likely consistent migraine headache however given her protracted symptoms cannot exclude intracranial mass or otherwise. Less likely meningitis given her afebrile status and lack of meningismus. No evidence of traumatic injury. Further work-up is warranted. Plan: Workup will include migraine cocktail, CT head. Will monitor closely and reassess. Reassessment/Disposition: CTH WNL. Pain well controlled. Pt well appearing and much improved, therefore felt safe to send home. Advised on return precautions and recommended close follow up. Amount and/or Complexity of Data Reviewed  Radiology: ordered. Risk  Prescription drug management. - Reviewed relevant past office visits/hospitalizations/procedures  -Obtained pertinent history that influenced decision making from the patient    ED Course as of 09/18/23 1627   Mon Sep 18, 2023   1340 CT head unremarkable   1340 Patient well-appearing at this time, symptoms much improved. She did develop anxiety, restlessness, concerning for akathisia secondary to droperidol. Given Benadryl, resolved without complication. Final Dispo   Final Diagnosis:  1. Migraine      Time reflects when diagnosis was documented in both MDM as applicable and the Disposition within this note     Time User Action Codes Description Comment    9/18/2023  1:54 PM Daryle Frost, [address] [place] [address] [Q31.545] Migraine       ED Disposition     ED Disposition   Discharge    Condition   Stable    Date/Time   Mon Sep 18, 2023  1:54 PM    Comment   Donna Castellano discharge to home/self care.                Follow-up Information     Follow up With Specialties Details Why [facility] Emergency Department Emergency Medicine  If symptoms worsen [address] [place] [address] Drive [address]  [facility] Emergency Department, [address] [place] [address],[address] [place] [address], [address] [phone]        Medications   ketorolac (TORADOL) injection 15 mg (15 mg Intravenous Given 9/18/23 1245)   droperidol (INAPSINE) injection 0.625 mg (0.625 mg Intravenous Given 9/18/23 1245)   sodium chloride 0.9 % bolus 1,000 mL (0 mL Intravenous Stopped 9/18/23 1402)   magnesium sulfate 2 g/50 mL IVPB (premix) 2 g (0 g Intravenous Stopped 9/18/23 1402)   diphenhydrAMINE (BENADRYL) injection 25 mg (25 mg Intravenous Given 9/18/23 1315)       Risk Stratification Tools                Orders Placed This Encounter   Procedures   • CT head without contrast       Labs:   Labs Reviewed - No data to display    Imaging:     CT head without contrast   Final Result by Darlene Winters MD (09/18 1312)      No acute intracranial abnormality. Workstation performed: [id number]            All details of the evaluation and treatment plan were made clear and additionally all questions and concerns were addressed while under my care. Portions of the record may have been created with voice recognition software. Occasional wrong word or "sound a like" substitutions may have occurred due to the inherent limitations of voice recognition software. Read the chart carefully and recognize, using context, where substitutions have occurred. The attending physician physically available and evaluated the above patient alongside myself.       Krista Rolon MD  09/18/23 2129

## 2023-09-18 NOTE — ED ATTENDING ATTESTATION
Praveen Wellington MD, saw and evaluated the patient. I have discussed the patient with the resident and agree with the resident's findings, Plan of Care, and MDM as documented in the resident's note, except where noted. All available labs and Radiology studies were reviewed. I was present for key portions of any procedure(s) performed by the resident and I was immediately available to provide assistance. At this point I agree with the current assessment done in the Emergency Department. I have conducted an independent evaluation of this patient a history and physical is as follows:    34 yo obese female with a history of anxiety, depression, hyperlipidemia, PTSD, and migraine headaches presents to the ED complaining of a headache x several weeks. The patient reports an intermittent "throbbing" headache in a band-like distribution around her head. (+) Associated nausea and photophobia. (+) Chronic neck pain --> unchanged from baseline. No fevers or chills. No visual disturbance. Minimal relief with OTC Motrin. No chest pain, shortness of breath, or diaphoresis. No other specific complaints. ROS: per resident physician note    Gen: NAD, AA&Ox3  HEENT: PERRL, EOMI  Neck: supple  CV: RRR  Lungs: CTA B/L  Abdomen: soft, NT/ND  Ext: no swelling or deformity  Neuro: 5/5 strength all extremities, sensation grossly intact  Skin: no rash    ED Course  The patient is comfortable appearing with stable vital signs and a benign exam. HA has been intermittent for several weeks. Migraine headache vs intracranial mass vs anxiety vs extension of her chronic neck pain? Low clinical suspicion for ICH or an infectious process. CT head ordered. Will administer IVFs, Toradol, droperidol, Benadryl, and magnesium. Will continue to monitor in the ED. Disposition per workup and reassessment.       Critical Care Time  Procedures

## 2023-10-26 NOTE — LETTER
August 11, 2022     Patient: Olivier Modi  YOB: 1990  Date of Visit: 8/11/2022      To Whom it May Concern: Paras Neil is under my professional care  Manuel Hays was seen in my office on 8/11/2022  Manuel Hays may return to work on 8/25/22  If you have any questions or concerns, please don't hesitate to call           Sincerely,          Thiago Mount VernonDO        CC: No Recipients
This document is complete and the patient is ready for discharge.

## 2023-12-03 ENCOUNTER — HOSPITAL ENCOUNTER (EMERGENCY)
Facility: HOSPITAL | Age: 33
Discharge: HOME | End: 2023-12-03
Attending: EMERGENCY MEDICINE | Admitting: EMERGENCY MEDICINE
Payer: MEDICARE

## 2023-12-03 VITALS
TEMPERATURE: 97.1 F | RESPIRATION RATE: 16 BRPM | SYSTOLIC BLOOD PRESSURE: 116 MMHG | HEART RATE: 105 BPM | DIASTOLIC BLOOD PRESSURE: 84 MMHG | OXYGEN SATURATION: 98 %

## 2023-12-03 DIAGNOSIS — K08.89 DENTALGIA: Primary | ICD-10-CM

## 2023-12-03 PROCEDURE — 99282 EMERGENCY DEPT VISIT SF MDM: CPT

## 2023-12-03 PROCEDURE — 99284 EMERGENCY DEPT VISIT MOD MDM: CPT | Performed by: EMERGENCY MEDICINE

## 2023-12-03 PROCEDURE — 96372 THER/PROPH/DIAG INJ SC/IM: CPT

## 2023-12-03 RX ORDER — ROPIVACAINE HYDROCHLORIDE 5 MG/ML
15 INJECTION, SOLUTION EPIDURAL; INFILTRATION; PERINEURAL ONCE
Status: COMPLETED | OUTPATIENT
Start: 2023-12-03 | End: 2023-12-03

## 2023-12-03 RX ORDER — KETOROLAC TROMETHAMINE 30 MG/ML
15 INJECTION, SOLUTION INTRAMUSCULAR; INTRAVENOUS ONCE
Status: COMPLETED | OUTPATIENT
Start: 2023-12-03 | End: 2023-12-03

## 2023-12-03 RX ORDER — ACETAMINOPHEN 325 MG/1
650 TABLET ORAL ONCE
Status: COMPLETED | OUTPATIENT
Start: 2023-12-03 | End: 2023-12-03

## 2023-12-03 RX ADMIN — ACETAMINOPHEN 650 MG: 325 TABLET, FILM COATED ORAL at 19:35

## 2023-12-03 RX ADMIN — KETOROLAC TROMETHAMINE 15 MG: 30 INJECTION, SOLUTION INTRAMUSCULAR; INTRAVENOUS at 19:35

## 2023-12-03 RX ADMIN — ROPIVACAINE HYDROCHLORIDE 15 ML: 5 INJECTION, SOLUTION EPIDURAL; INFILTRATION; PERINEURAL at 19:15

## 2023-12-04 NOTE — ED PROVIDER NOTES
History  Chief Complaint   Patient presents with    Dental Pain     About 1 hr started having dental pain that radiates to right cheek      Patient is a 51-year-old female, no past medical history presenting today for evaluation of dental pain. Per patient approximately 1 hour prior while she began feeling right-sided upper dental pain with radiation to her cheek and underneath her right eye. Patient denies any trauma to her teeth today, or chewing any hard foods recently. Patient states she saw a dentist approximately 1.5 years ago who advised her to get a root canal, patient did not follow-up regarding this. From time to time, she does experience pain in this area, but today is worse than normal.  Patient did not take any medications for pain prior to arrival to the ED. Patient denies any voice changes, difficulty breathing, difficulty swallowing, trismus, fever, or otherwise symptoms at this time. Prior to Admission Medications   Prescriptions Last Dose Informant Patient Reported? Taking?    ARIPiprazole (ABILIFY) 5 mg tablet   Yes No   Sig: Take 5 mg by mouth daily   QUEtiapine (SEROquel) 25 mg tablet  Self Yes No   Sig: Take 25 mg by mouth daily at bedtime   Patient not taking: Reported on 8/3/2023   Xarelto 20 MG tablet   No No   Sig: TAKE 1 TABLET BY MOUTH DAILY WITH BREAKFAST   Patient not taking: Reported on 6/7/2023   benzonatate (TESSALON) 200 MG capsule   Yes No   Sig: TAKE 1 CAPSULE BY MOUTH THREE TIMES A DAY AS NEEDED FOR COUGH   buPROPion (WELLBUTRIN SR) 100 mg 12 hr tablet  Self Yes No   Sig: Take 100 mg by mouth daily after breakfast   Patient not taking: Reported on 6/7/2023   cyclobenzaprine (FLEXERIL) 10 mg tablet   Yes No   Sig: Take 10 mg by mouth 3 (three) times a day   Patient not taking: Reported on 8/3/2023   diazepam (VALIUM) 5 mg tablet   Yes No   Sig: Take 5 mg by mouth every 8 (eight) hours as needed   gabapentin (NEURONTIN) 400 mg capsule   Yes No   Sig: Take 400 mg by mouth 3 (three) times a day   hydrOXYzine HCL (ATARAX) 50 mg tablet   No No   Sig: TAKE 0.5 TABLETS (25 MG TOTAL) BY MOUTH EVERY 6 (SIX) HOURS AS NEEDED FOR ANXIETY   meclizine (ANTIVERT) 12.5 MG tablet   Yes No   Sig: TAKE 1 TABLET (12.5 MG TOTAL) BY MOUTH EVERY 8 (EIGHT) HOURS AS NEEDED FOR DIZZINESS.    Patient not taking: Reported on 8/3/2023   naproxen (NAPROSYN) 500 mg tablet   No No   Sig: TAKE 1 TABLET BY MOUTH TWICE A DAY WITH MEALS   Patient not taking: Reported on 6/7/2023   ondansetron (ZOFRAN) 4 mg tablet  Self No No   Sig: Take 1 tablet (4 mg total) by mouth every 6 (six) hours   Patient not taking: Reported on 6/7/2023   ondansetron (ZOFRAN-ODT) 4 mg disintegrating tablet  Self Yes No   Sig: TAKE 1 TABLET BY MOUTH EVERY 8 HOURS AS NEEDED FOR NAUSEA AND VOMITING   Patient not taking: Reported on 6/7/2023   oxyCODONE (OXY-IR) 5 MG capsule   Yes No   Sig: Take 5 mg by mouth every 4 (four) hours as needed   Patient not taking: Reported on 8/3/2023   sertraline (ZOLOFT) 100 mg tablet  Self No No   Sig: TAKE 2 TABLETS (200 MG TOTAL) BY MOUTH DAILY AT BEDTIME   Patient not taking: Reported on 6/7/2023   venlafaxine (EFFEXOR-XR) 37.5 mg 24 hr capsule  Self Yes No   Sig: Take 75 mg by mouth every morning   Patient not taking: Reported on 8/3/2023      Facility-Administered Medications: None       Past Medical History:   Diagnosis Date    Abnormal Pap smear of cervix     Anxiety     Depression     History of positive PPD     Hyperlipidemia     Menorrhagia     Pelvic kidney     left side    PTSD (post-traumatic stress disorder)     STD (sexually transmitted disease)     Thrombosis of ovarian vein        Past Surgical History:   Procedure Laterality Date    APPENDECTOMY      CYSTOSCOPY N/A 12/19/2022    Procedure: Sharad Duron;  Surgeon: Mina Watts MD;  [facility];  Service: Gynecology Oncology    [place] COLPOSCOPY CERVIX VAG LOOP ELTRD [address] N/A 7/31/2020 Procedure: BIOPSY CONE/COLD KNIFE CERVIX, [facility];  Surgeon: Son Rodriguez MD;  [facility];  Service: Gynecology    [place] LAPS TOTAL HYSTERECT 250 GM/< W/RMVL TUBE/OVARY N/A 12/19/2022    Procedure: HYSTERECTOMY LAPAROSCOPIC TOTAL ([address]) W/ ROBOTICS, BILATERAL SALPINGECTOMY;  Surgeon: Seda Garcia MD;  [facility];  Service: Gynecology Oncology       Family History   Problem Relation Age of Onset    Crohn's disease Brother     Crohn's disease Maternal Aunt     Diabetes Maternal Grandmother     Clotting disorder Maternal Grandmother     Hypertension Maternal Grandmother     Diabetes Maternal Grandfather     Diabetes Paternal Grandmother     Diabetes Paternal Grandfather     COPD Mother     Diabetes Father     Hypertension Father     Depression Sister      I have reviewed and agree with the history as documented. E-Cigarette/Vaping    E-Cigarette Use Never User      E-Cigarette/Vaping Substances    Nicotine No     THC No     CBD No     Flavoring No     Other No     Unknown No      Social History     Tobacco Use    Smoking status: Some Days     Packs/day: 0.25     Years: 10.00     Total pack years: 2.50     Types: Cigarettes    Smokeless tobacco: Never    Tobacco comments:     4 cig / day    Vaping Use    Vaping Use: Never used   Substance Use Topics    Alcohol use: Yes     Alcohol/week: 24.0 standard drinks of alcohol     Types: 24 Standard drinks or equivalent per week     Comment: socially    Drug use: No        Review of Systems   Constitutional:  Negative for chills, fatigue and fever. HENT:  Positive for dental problem. Negative for congestion. Respiratory:  Negative for cough, chest tightness and shortness of breath. Cardiovascular:  Negative for chest pain. Gastrointestinal:  Negative for abdominal pain, diarrhea, nausea and vomiting. Genitourinary:  Negative for difficulty urinating. Skin:  Negative for color change.    Neurological:  Negative for dizziness, syncope, weakness, numbness and headaches. Physical Exam  ED Triage Vitals [12/03/23 1825]   Temperature Pulse Respirations Blood Pressure SpO2   (!) 97.1 °F (36.2 °C) 105 16 116/84 98 %      Temp Source Heart Rate Source Patient Position - Orthostatic VS BP Location FiO2 (%)   Temporal Monitor -- Left arm --      Pain Score       8             Orthostatic Vital Signs  Vitals:    12/03/23 1825   BP: 116/84   Pulse: 105       Physical Exam  Vitals and nursing note reviewed. Constitutional:       General: She is not in acute distress. Appearance: She is not ill-appearing or toxic-appearing. HENT:      Head: Normocephalic and atraumatic. Nose: No congestion. Mouth/Throat:      Lips: Pink. No lesions. Mouth: Mucous membranes are moist. No injury or oral lesions. Dentition: Abnormal dentition. Dental tenderness and dental caries present. No gingival swelling or dental abscesses. Tongue: No lesions. Pharynx: Oropharynx is clear. Uvula midline. No pharyngeal swelling, oropharyngeal exudate or posterior oropharyngeal erythema. Eyes:      Extraocular Movements: Extraocular movements intact. Cardiovascular:      Rate and Rhythm: Normal rate. Pulmonary:      Effort: Pulmonary effort is normal. No respiratory distress. Musculoskeletal:         General: Normal range of motion. Cervical back: Full passive range of motion without pain and neck supple. Skin:     General: Skin is warm and dry. Neurological:      General: No focal deficit present. Mental Status: She is alert.          ED Medications  Medications   acetaminophen (TYLENOL) tablet 650 mg (650 mg Oral Given 12/3/23 1935)   ketorolac (TORADOL) injection 15 mg (15 mg Intramuscular Given 12/3/23 1935)   ropivacaine (NAROPIN) 0.5 % injection 15 mL (15 mL Infiltration Given by Other 12/3/23 1915)       Diagnostic Studies  Results Reviewed       None                   No orders to display Procedures  Procedures      ED Course  ED Course as of 12/03/23 1957   Lamin Parkinson Dec 03, 2023   1855 Pt seen and evaluated by me  Ddx: Fabi Maravilla. No concern at this time for abscess or otherwise dental infection  Plan: Superior alveolar nerve block, tylenol, toradol   1931 Superior alveolar nerve block attempted with lidocaine, will f/u regarding pain control   1945 Pt re-evaluated, pain resolved. Will discharge. 1951 Patient discharged home, given dental clinic she instructions to follow-up. Given return precautions. Patient reports understanding, all questions answered. Medical Decision Making  Please see ED course above regarding details of the MDM. Risk  OTC drugs. Prescription drug management. Disposition  Final diagnoses:   Fabi Maravilla     Time reflects when diagnosis was documented in both MDM as applicable and the Disposition within this note       Time User Action Codes Description Comment    12/3/2023  7:51 PM Vicente Clements Add [O45.70] Fabiaustin Maravilla           ED Disposition       ED Disposition   Discharge    Condition   Stable    Date/Time   Sun Dec 3, 2023  7:51 PM    Comment   Cynthia Duvall discharge to home/self care.                    Follow-up Information    None         Discharge Medication List as of 12/3/2023  7:51 PM        CONTINUE these medications which have NOT CHANGED    Details   ARIPiprazole (ABILIFY) 5 mg tablet Take 5 mg by mouth daily, Starting Mon 5/1/2023, Historical Med      benzonatate (TESSALON) 200 MG capsule TAKE 1 CAPSULE BY MOUTH THREE TIMES A DAY AS NEEDED FOR COUGH, Historical Med      buPROPion (WELLBUTRIN SR) 100 mg 12 hr tablet Take 100 mg by mouth daily after breakfast, Historical Med      cyclobenzaprine (FLEXERIL) 10 mg tablet Take 10 mg by mouth 3 (three) times a day, Starting Sun 6/4/2023, Historical Med      diazepam (VALIUM) 5 mg tablet Take 5 mg by mouth every 8 (eight) hours as needed, Starting Sun 6/4/2023, Historical Med      gabapentin (NEURONTIN) 400 mg capsule Take 400 mg by mouth 3 (three) times a day, Starting Tue 5/16/2023, Historical Med      hydrOXYzine HCL (ATARAX) 50 mg tablet TAKE 0.5 TABLETS (25 MG TOTAL) BY MOUTH EVERY 6 (SIX) HOURS AS NEEDED FOR ANXIETY, Starting Tue 8/1/2023, Until Sun 1/28/2024 at 2359, Normal      meclizine (ANTIVERT) 12.5 MG tablet TAKE 1 TABLET (12.5 MG TOTAL) BY MOUTH EVERY 8 (EIGHT) HOURS AS NEEDED FOR DIZZINESS., Historical Med      naproxen (NAPROSYN) 500 mg tablet TAKE 1 TABLET BY MOUTH TWICE A DAY WITH MEALS, Normal      ondansetron (ZOFRAN) 4 mg tablet Take 1 tablet (4 mg total) by mouth every 6 (six) hours, Starting Mon 1/16/2023, Normal      ondansetron (ZOFRAN-ODT) 4 mg disintegrating tablet TAKE 1 TABLET BY MOUTH EVERY 8 HOURS AS NEEDED FOR NAUSEA AND VOMITING, Historical Med      oxyCODONE (OXY-IR) 5 MG capsule Take 5 mg by mouth every 4 (four) hours as needed, Starting Wed 6/7/2023, Historical Med      QUEtiapine (SEROquel) 25 mg tablet Take 25 mg by mouth daily at bedtime, Historical Med      sertraline (ZOLOFT) 100 mg tablet TAKE 2 TABLETS (200 MG TOTAL) BY MOUTH DAILY AT BEDTIME, Starting Mon 11/21/2022, Normal      venlafaxine (EFFEXOR-XR) 37.5 mg 24 hr capsule Take 75 mg by mouth every morning, Starting Wed 1/18/2023, Historical Med      Xarelto 20 MG tablet TAKE 1 TABLET BY MOUTH DAILY WITH BREAKFAST, Normal           No discharge procedures on file. PDMP Review         Value Time User    PDMP Reviewed  Yes 7/7/2022  6:03 PM Jeet Shannon MD             ED Provider  Attending physically available and evaluated Leticia Grier. I managed the patient along with the ED Attending.     Electronically Signed by           Jennifer Galvan MD  12/03/23 1958

## 2023-12-04 NOTE — ED ATTENDING ATTESTATION
12/3/2023  IMarta MD, saw and evaluated the patient. I have discussed the patient with the resident/non-physician practitioner and agree with the resident's/non-physician practitioner's findings, Plan of Care, and MDM as documented in the resident's/non-physician practitioner's note, except where noted. All available labs and Radiology studies were reviewed. I was present for key portions of any procedure(s) performed by the resident/non-physician practitioner and I was immediately available to provide assistance. At this point I agree with the current assessment done in the Emergency Department. I have conducted an independent evaluation of this patient a history and physical is as follows:    ED Course       27-year-old female with a history of poor dentition presents with dental pain right upper maxilla radiating to j face and right eye. Symptoms became worse this evening. Denies fevers chills. Vitals reviewed. Oropharynx open and patent poor dentition noted over right maxilla specifically molars premolars on right. No soft tissue swelling of gingiva. Floor mouth soft no tongue elevation no pain with range of motion of neck. Impression: Dental pain  Differential diagnosis: Dental caries, doubt dental abscess doubt deep space infection face or neck. Plan to perform dental block multimodal analgesia trial.  Will discharge with outpatient referral to dental office. Patient underwent successful right superior middle alveolar nerve block by resident myself. Patient tolerated procedure well had no immediate complications discharged home follow-up with dental as discussed.   Outpatient dental resources provided to patient upon request    Critical Care Time  Procedures

## 2023-12-04 NOTE — DISCHARGE INSTRUCTIONS
You have been seen in the emergency department for evaluation of dental pain. On our evaluation, there is no emergent cause of your symptoms today. We were able to control your pain with ropivacaine, Tylenol, Toradol. Please follow-up with a dentist within the next week regarding management and symptom control. We have provided a list of dentists in the area for you. Please return to the ED if you have shortness of breath or difficulty swallowing.

## 2023-12-13 ENCOUNTER — OFFICE VISIT (OUTPATIENT)
Dept: OBGYN CLINIC | Facility: CLINIC | Age: 33
End: 2023-12-13

## 2023-12-13 VITALS
SYSTOLIC BLOOD PRESSURE: 117 MMHG | HEART RATE: 72 BPM | BODY MASS INDEX: 28.06 KG/M2 | HEIGHT: 66 IN | DIASTOLIC BLOOD PRESSURE: 58 MMHG | WEIGHT: 174.6 LBS

## 2023-12-13 DIAGNOSIS — B37.31 VULVOVAGINAL CANDIDIASIS: Primary | ICD-10-CM

## 2023-12-13 LAB
BV WHIFF TEST VAG QL: NEGATIVE
CLUE CELLS SPEC QL WET PREP: NEGATIVE
PH SMN: 4 [PH]
SL AMB POCT WET MOUNT: ABNORMAL
T VAGINALIS VAG QL WET PREP: NEGATIVE
YEAST VAG QL WET PREP: POSITIVE

## 2023-12-13 PROCEDURE — 87210 SMEAR WET MOUNT SALINE/INK: CPT | Performed by: NURSE PRACTITIONER

## 2023-12-13 PROCEDURE — 99213 OFFICE O/P EST LOW 20 MIN: CPT | Performed by: NURSE PRACTITIONER

## 2023-12-13 RX ORDER — FLUCONAZOLE 150 MG/1
150 TABLET ORAL
Qty: 2 TABLET | Refills: 0 | Status: SHIPPED | OUTPATIENT
Start: 2023-12-13 | End: 2023-12-17

## 2023-12-13 NOTE — PROGRESS NOTES
PROBLEM GYNECOLOGICAL VISIT    Smooth Alarcon is a 35 y.o. female who presents today with complaint of vaginitis. Her general medical history has been reviewed and she reports it as follows:    Past Medical History:   Diagnosis Date    Abnormal Pap smear of cervix     Anxiety     Depression     History of positive PPD     Hyperlipidemia     Menorrhagia     Pelvic kidney     left side    PTSD (post-traumatic stress disorder)     STD (sexually transmitted disease)     Thrombosis of ovarian vein      Past Surgical History:   Procedure Laterality Date    APPENDECTOMY      CYSTOSCOPY N/A 2022    Procedure: Antony Mcgillo;  Surgeon: Patel Florian MD;  [facility];  Service: Gynecology Oncology    [place] COLPOSCOPY CERVIX VAG LOOP [address]TRD  [address] N/A 2020    Procedure: BIOPSY CONE/COLD KNIFE CERVIX, [address];  Surgeon: Tanesha Kim MD;  [facility];  Service: Gynecology    [place] LAPS TOTAL HYSTERECT 250 GM/< W/RMVL TUBE/OVARY N/A 2022    Procedure: HYSTERECTOMY LAPAROSCOPIC TOTAL ([address]) W/ ROBOTICS, BILATERAL SALPINGECTOMY;  Surgeon: Patel Florian MD;  [facility];  Service: Gynecology Oncology     OB History          4    Para   4    Term   3       1    AB        Living   4         SAB        IAB        Ectopic        Multiple   0    Live Births   4               Social History     Tobacco Use    Smoking status: Some Days     Current packs/day: 0.25     Average packs/day: 0.3 packs/day for 10.0 years (2.5 ttl pk-yrs)     Types: Cigarettes    Smokeless tobacco: Never    Tobacco comments:     4 cig / day    Vaping Use    Vaping status: Never Used   Substance Use Topics    Alcohol use:  Yes     Alcohol/week: 24.0 standard drinks of alcohol     Types: 24 Standard drinks or equivalent per week     Comment: socially    Drug use: No     Social History     Substance and Sexual Activity   Sexual Activity Yes    Partners: Male Birth control/protection: Female Sterilization       Current Outpatient Medications   Medication Instructions    ARIPiprazole (ABILIFY) 5 mg, Daily    benzonatate (TESSALON) 200 MG capsule TAKE 1 CAPSULE BY MOUTH THREE TIMES A DAY AS NEEDED FOR COUGH    buPROPion (WELLBUTRIN SR) 100 mg, Daily after breakfast    cyclobenzaprine (FLEXERIL) 10 mg, 3 times daily    diazepam (VALIUM) 5 mg, Oral, Every 8 hours PRN    gabapentin (NEURONTIN) 400 mg, Oral, 3 times daily    hydrOXYzine HCL (ATARAX) 25 mg, Oral, Every 6 hours PRN    meclizine (ANTIVERT) 12.5 MG tablet TAKE 1 TABLET (12.5 MG TOTAL) BY MOUTH EVERY 8 (EIGHT) HOURS AS NEEDED FOR DIZZINESS. naproxen (NAPROSYN) 500 mg tablet TAKE 1 TABLET BY MOUTH TWICE A DAY WITH MEALS    ondansetron (ZOFRAN) 4 mg, Oral, Every 6 hours    ondansetron (ZOFRAN-ODT) 4 mg disintegrating tablet TAKE 1 TABLET BY MOUTH EVERY 8 HOURS AS NEEDED FOR NAUSEA AND VOMITING    oxyCODONE (OXY-IR) 5 mg, Every 4 hours PRN    QUEtiapine (SEROQUEL) 25 mg, Daily at bedtime    sertraline (ZOLOFT) 200 mg, Oral, Daily at bedtime    venlafaxine (EFFEXOR-XR) 75 mg, Every morning    Xarelto 20 MG tablet TAKE 1 TABLET BY MOUTH DAILY WITH BREAKFAST       History of Present Illness:   Edel Baires presents today reporting a few days of a suspected yeast infection. She reports intense vaginal itching/burning/irritation. She denies any discharge. She does report a slight odor. She recently completed a 7 day course of Augmentin for dental work, which she believes caused the infection. She is not currently sexually active. Review of Systems:  Review of Systems   Constitutional: Negative. Gastrointestinal: Negative. Genitourinary:         Vaginal irritation        Physical Exam:  Ht 5' 6" (1.676 m)   Wt 79.2 kg (174 lb 9.6 oz)   LMP  (LMP Unknown)   BMI 28.18 kg/m²   Physical Exam  Constitutional:       General: She is not in acute distress. Appearance: Normal appearance.    Genitourinary:      Vaginal discharge, erythema and tenderness present. Neurological:      Mental Status: She is alert. Skin:     General: Skin is warm and dry. Psychiatric:         Mood and Affect: Mood normal.         Behavior: Behavior normal.   Vitals reviewed. Point of Care Testing:   -Wet mount: +yeast, -clue cells,    -KOH mount: +yeast   -Whiff: negative    -pH 4    Assessment:   1. Vulvovaginal candidiasis     Plan:   1. Rx for Diflucan    2. Declines STI screening    3. Return for annual exam or sooner if needed. Reviewed with patient that test results are available in [facility] immediately, but that they will not necessarily be reviewed by me immediately. Explained that I will review results at my earliest opportunity and contact patient appropriately.

## 2023-12-19 ENCOUNTER — HOSPITAL ENCOUNTER (EMERGENCY)
Facility: HOSPITAL | Age: 33
Discharge: HOME | End: 2023-12-19
Attending: EMERGENCY MEDICINE
Payer: MEDICARE

## 2023-12-19 ENCOUNTER — APPOINTMENT (EMERGENCY)
Dept: NON INVASIVE DIAGNOSTICS | Facility: HOSPITAL | Age: 33
End: 2023-12-19
Payer: MEDICARE

## 2023-12-19 VITALS
HEART RATE: 97 BPM | OXYGEN SATURATION: 97 % | RESPIRATION RATE: 18 BRPM | TEMPERATURE: 98.1 F | SYSTOLIC BLOOD PRESSURE: 119 MMHG | DIASTOLIC BLOOD PRESSURE: 67 MMHG

## 2023-12-19 DIAGNOSIS — M79.605 LEFT LEG PAIN: Primary | ICD-10-CM

## 2023-12-19 PROCEDURE — 93971 EXTREMITY STUDY: CPT | Performed by: SURGERY

## 2023-12-19 PROCEDURE — 93971 EXTREMITY STUDY: CPT

## 2023-12-19 PROCEDURE — 99283 EMERGENCY DEPT VISIT LOW MDM: CPT

## 2023-12-19 PROCEDURE — 96372 THER/PROPH/DIAG INJ SC/IM: CPT

## 2023-12-19 PROCEDURE — 99284 EMERGENCY DEPT VISIT MOD MDM: CPT | Performed by: EMERGENCY MEDICINE

## 2023-12-19 RX ORDER — KETOROLAC TROMETHAMINE 30 MG/ML
15 INJECTION, SOLUTION INTRAMUSCULAR; INTRAVENOUS ONCE
Status: COMPLETED | OUTPATIENT
Start: 2023-12-19 | End: 2023-12-19

## 2023-12-19 RX ADMIN — KETOROLAC TROMETHAMINE 15 MG: 30 INJECTION, SOLUTION INTRAMUSCULAR; INTRAVENOUS at 21:05

## 2023-12-20 NOTE — DISCHARGE INSTRUCTIONS
Take ibuprofen every 6 hours as needed for leg pain. Try alternating ice and warm compresses.     Follow up with your PCP for lingering symptoms.     If you develop new or worsening symptoms, please return to the Emergency Department for further evaluation.

## 2023-12-20 NOTE — ED PROVIDER NOTES
"History  Chief Complaint   Patient presents with    Leg Pain     Patient was laying in bed when her L leg got itchy and painful. Looked at her leg and noticed a bruise below her knee with a lump. States no trauma to leg and has hx of blood clots     HPI    Patient is a 32 y/o F with PMH ovarian vein thrombosis presenting with left leg pain.  Patient states he was laying in bed when she noticed that her left leg was itchy and painful.  She notes that there is a \"lump\" and would look like bruising.  The areas been very painful.  She does not recall any trauma to the area.  She is concerned she has a history of blood clot.  She is not currently on any blood thinner.  She has no fever, chills, nausea, vomiting.    Prior to Admission Medications   Prescriptions Last Dose Informant Patient Reported? Taking?   ARIPiprazole (ABILIFY) 5 mg tablet   Yes No   Sig: Take 5 mg by mouth daily   Patient not taking: Reported on 12/13/2023   QUEtiapine (SEROquel) 25 mg tablet  Self Yes No   Sig: Take 25 mg by mouth daily at bedtime   Patient not taking: Reported on 8/3/2023   Xarelto 20 MG tablet   No No   Sig: TAKE 1 TABLET BY MOUTH DAILY WITH BREAKFAST   Patient not taking: Reported on 6/7/2023   benzonatate (TESSALON) 200 MG capsule   Yes No   Sig: TAKE 1 CAPSULE BY MOUTH THREE TIMES A DAY AS NEEDED FOR COUGH   Patient not taking: Reported on 12/13/2023   buPROPion (WELLBUTRIN SR) 100 mg 12 hr tablet  Self Yes No   Sig: Take 100 mg by mouth daily after breakfast   Patient not taking: Reported on 6/7/2023   cyclobenzaprine (FLEXERIL) 10 mg tablet   Yes No   Sig: Take 10 mg by mouth 3 (three) times a day   Patient not taking: Reported on 8/3/2023   diazepam (VALIUM) 5 mg tablet   Yes No   Sig: Take 5 mg by mouth every 8 (eight) hours as needed   gabapentin (NEURONTIN) 400 mg capsule   Yes No   Sig: Take 400 mg by mouth 3 (three) times a day   hydrOXYzine HCL (ATARAX) 50 mg tablet   No No   Sig: TAKE 0.5 TABLETS (25 MG TOTAL) BY " MOUTH EVERY 6 (SIX) HOURS AS NEEDED FOR ANXIETY   meclizine (ANTIVERT) 12.5 MG tablet   Yes No   Sig: TAKE 1 TABLET (12.5 MG TOTAL) BY MOUTH EVERY 8 (EIGHT) HOURS AS NEEDED FOR DIZZINESS.   Patient not taking: Reported on 8/3/2023   naproxen (NAPROSYN) 500 mg tablet   No No   Sig: TAKE 1 TABLET BY MOUTH TWICE A DAY WITH MEALS   Patient not taking: Reported on 6/7/2023   ondansetron (ZOFRAN) 4 mg tablet  Self No No   Sig: Take 1 tablet (4 mg total) by mouth every 6 (six) hours   Patient not taking: Reported on 6/7/2023   ondansetron (ZOFRAN-ODT) 4 mg disintegrating tablet  Self Yes No   Sig: TAKE 1 TABLET BY MOUTH EVERY 8 HOURS AS NEEDED FOR NAUSEA AND VOMITING   Patient not taking: Reported on 6/7/2023   oxyCODONE (OXY-IR) 5 MG capsule   Yes No   Sig: Take 5 mg by mouth every 4 (four) hours as needed   Patient not taking: Reported on 8/3/2023   sertraline (ZOLOFT) 100 mg tablet  Self No No   Sig: TAKE 2 TABLETS (200 MG TOTAL) BY MOUTH DAILY AT BEDTIME   Patient not taking: Reported on 6/7/2023   venlafaxine (EFFEXOR-XR) 37.5 mg 24 hr capsule  Self Yes No   Sig: Take 75 mg by mouth every morning   Patient not taking: Reported on 8/3/2023      Facility-Administered Medications: None       Past Medical History:   Diagnosis Date    Abnormal Pap smear of cervix     Anxiety     Depression     History of positive PPD     Hyperlipidemia     Menorrhagia     Pelvic kidney     left side    PTSD (post-traumatic stress disorder)     STD (sexually transmitted disease)     Thrombosis of ovarian vein        Past Surgical History:   Procedure Laterality Date    APPENDECTOMY      CYSTOSCOPY N/A 12/19/2022    Procedure: CYSTOSCOPY, INSERTION URETERAL CATHETERS;  Surgeon: Danny Brower MD;  [facility];  Service: Gynecology Oncology    HI COLPOSCOPY CERVIX VAG LOOP ELTRD BX CERVIX N/A 7/31/2020    Procedure: BIOPSY CONE/COLD KNIFE CERVIX, ECC;  Surgeon: Gaudencio Gonsales MD;  [facility];  Service: Gynecology     CT LAPS TOTAL HYSTERECT 250 GM/< W/RMVL TUBE/OVARY N/A 12/19/2022    Procedure: HYSTERECTOMY LAPAROSCOPIC TOTAL (LTH) W/ ROBOTICS, BILATERAL SALPINGECTOMY;  Surgeon: Danny Brower MD;  [facility];  Service: Gynecology Oncology       Family History   Problem Relation Age of Onset    Crohn's disease Brother     Crohn's disease Maternal Aunt     Diabetes Maternal Grandmother     Clotting disorder Maternal Grandmother     Hypertension Maternal Grandmother     Diabetes Maternal Grandfather     Diabetes Paternal Grandmother     Diabetes Paternal Grandfather     COPD Mother     Diabetes Father     Hypertension Father     Depression Sister      I have reviewed and agree with the history as documented.    E-Cigarette/Vaping    E-Cigarette Use Never User      E-Cigarette/Vaping Substances    Nicotine No     THC No     CBD No     Flavoring No     Other No     Unknown No      Social History     Tobacco Use    Smoking status: Some Days     Current packs/day: 0.25     Average packs/day: 0.3 packs/day for 10.0 years (2.5 ttl pk-yrs)     Types: Cigarettes    Smokeless tobacco: Never    Tobacco comments:     4 cig / day    Vaping Use    Vaping status: Never Used   Substance Use Topics    Alcohol use: Yes     Alcohol/week: 24.0 standard drinks of alcohol     Types: 24 Standard drinks or equivalent per week     Comment: socially    Drug use: No        Review of Systems   Constitutional:  Negative for chills and fever.   HENT:  Negative for ear pain and sore throat.    Eyes:  Negative for pain and visual disturbance.   Respiratory:  Negative for cough and shortness of breath.    Cardiovascular:  Negative for chest pain and palpitations.   Gastrointestinal:  Negative for abdominal pain and vomiting.   Genitourinary:  Negative for dysuria and hematuria.   Musculoskeletal:  Negative for arthralgias and back pain.   Skin:  Negative for color change and rash.   Neurological:  Negative for seizures and syncope.   All  other systems reviewed and are negative.      Physical Exam  ED Triage Vitals   Temperature Pulse Respirations Blood Pressure SpO2   12/19/23 2003 12/19/23 2003 12/19/23 2003 12/19/23 2003 12/19/23 2003   98.1 °F (36.7 °C) 97 18 119/67 97 %      Temp Source Heart Rate Source Patient Position - Orthostatic VS BP Location FiO2 (%)   12/19/23 2003 12/19/23 2003 -- -- --   Oral Monitor         Pain Score       12/19/23 2105       6             Orthostatic Vital Signs  Vitals:    12/19/23 2003   BP: 119/67   Pulse: 97       Physical Exam  Vitals and nursing note reviewed.   Constitutional:       General: She is not in acute distress.  HENT:      Head: Normocephalic and atraumatic.      Right Ear: External ear normal.      Left Ear: External ear normal.      Nose: Nose normal.      Mouth/Throat:      Pharynx: Oropharynx is clear.   Eyes:      Extraocular Movements: Extraocular movements intact.      Pupils: Pupils are equal, round, and reactive to light.   Cardiovascular:      Rate and Rhythm: Normal rate and regular rhythm.      Pulses: Normal pulses.      Heart sounds: Normal heart sounds. No murmur heard.     No friction rub. No gallop.   Pulmonary:      Effort: Pulmonary effort is normal. No respiratory distress.      Breath sounds: Normal breath sounds. No wheezing, rhonchi or rales.   Abdominal:      General: Abdomen is flat. There is no distension.      Palpations: Abdomen is soft.      Tenderness: There is no abdominal tenderness. There is no guarding or rebound.   Musculoskeletal:         General: No deformity. Normal range of motion.      Cervical back: Normal range of motion.      Right lower leg: No edema.      Left lower leg: No edema.      Comments: Left anterior shin with raised area covered by ecchymosis.  There is no palpable cord.  The calf is somewhat tender to palpation however there is no obvious edema.  Compartments are soft.   Skin:     General: Skin is warm and dry.      Capillary Refill: Capillary  refill takes less than 2 seconds.      Findings: No rash.   Neurological:      General: No focal deficit present.      Mental Status: She is alert and oriented to person, place, and time.      Gait: Gait normal.   Psychiatric:         Mood and Affect: Mood normal.         ED Medications  Medications   ketorolac (TORADOL) injection 15 mg (15 mg Intramuscular Given 12/19/23 2105)       Diagnostic Studies  Results Reviewed       None                   VAS lower limb venous duplex study, unilateral/limited   Final Result by Ruben Rodriguez MD (12/19 3494)            Procedures  Procedures      ED Course  ED Course as of 12/20/23 2355   Tue Dec 19, 2023   2204 VAS lower limb venous duplex study, unilateral/limited  Negative for DVT                             SBIRT 20yo+      Flowsheet Row Most Recent Value   Initial Alcohol Screen: US AUDIT-C     1. How often do you have a drink containing alcohol? 0 Filed at: 12/19/2023 2025   2. How many drinks containing alcohol do you have on a typical day you are drinking?  0 Filed at: 12/19/2023 2025   3b. FEMALE Any Age, or MALE 65+: How often do you have 4 or more drinks on one occassion? 0 Filed at: 12/19/2023 2025   Audit-C Score 0 Filed at: 12/19/2023 2025   ЕКАТЕРИНА: How many times in the past year have you...    Used an illegal drug or used a prescription medication for non-medical reasons? Never Filed at: 12/19/2023 2025                  Medical Decision Making  33-year-old female presenting for evaluation of acute left leg pain.  Given the history of blood clot, will obtain duplex of the left lower extremity.  Duplex was negative.  Unclear etiology, most likely a bruise versus less likely superficial thrombophlebitis.  Recommend Tylenol and ibuprofen for pain, warm compresses.  Patient understands and agrees with plan.  Return precautions were discussed.    Amount and/or Complexity of Data Reviewed  Radiology:  Decision-making details documented in ED  Course.    Risk  Prescription drug management.          Disposition  Final diagnoses:   Left leg pain     Time reflects when diagnosis was documented in both MDM as applicable and the Disposition within this note       Time User Action Codes Description Comment    12/19/2023  9:47 PM Wilder Paulo Add [M79.605] Left leg pain           ED Disposition       ED Disposition   Discharge    Condition   Stable    Date/Time   Tue Dec 19, 2023 2204    Comment   Odilia Zepeda discharge to home/self care.                   Follow-up Information       Follow up With Specialties Details Why Contact Info Additional Information    [facility] Emergency Department Emergency Medicine  If symptoms worsen [address] [facility] [address] [phone] [facility], [address], [place], [address] [phone]            Discharge Medication List as of 12/19/2023 10:04 PM        CONTINUE these medications which have NOT CHANGED    Details   ARIPiprazole (ABILIFY) 5 mg tablet Take 5 mg by mouth daily, Starting Mon 5/1/2023, Historical Med      benzonatate (TESSALON) 200 MG capsule TAKE 1 CAPSULE BY MOUTH THREE TIMES A DAY AS NEEDED FOR COUGH, Historical Med      buPROPion (WELLBUTRIN SR) 100 mg 12 hr tablet Take 100 mg by mouth daily after breakfast, Historical Med      cyclobenzaprine (FLEXERIL) 10 mg tablet Take 10 mg by mouth 3 (three) times a day, Starting Sun 6/4/2023, Historical Med      diazepam (VALIUM) 5 mg tablet Take 5 mg by mouth every 8 (eight) hours as needed, Starting Sun 6/4/2023, Historical Med      gabapentin (NEURONTIN) 400 mg capsule Take 400 mg by mouth 3 (three) times a day, Starting Tue 5/16/2023, Historical Med      hydrOXYzine HCL (ATARAX) 50 mg tablet TAKE 0.5 TABLETS (25 MG TOTAL) BY MOUTH EVERY 6 (SIX) HOURS AS NEEDED FOR ANXIETY, Starting Tue 8/1/2023, Until Sun 1/28/2024 at 2359, Normal      meclizine  (ANTIVERT) 12.5 MG tablet TAKE 1 TABLET (12.5 MG TOTAL) BY MOUTH EVERY 8 (EIGHT) HOURS AS NEEDED FOR DIZZINESS., Historical Med      naproxen (NAPROSYN) 500 mg tablet TAKE 1 TABLET BY MOUTH TWICE A DAY WITH MEALS, Normal      ondansetron (ZOFRAN) 4 mg tablet Take 1 tablet (4 mg total) by mouth every 6 (six) hours, Starting Mon 1/16/2023, Normal      ondansetron (ZOFRAN-ODT) 4 mg disintegrating tablet TAKE 1 TABLET BY MOUTH EVERY 8 HOURS AS NEEDED FOR NAUSEA AND VOMITING, Historical Med      oxyCODONE (OXY-IR) 5 MG capsule Take 5 mg by mouth every 4 (four) hours as needed, Starting Wed 6/7/2023, Historical Med      QUEtiapine (SEROquel) 25 mg tablet Take 25 mg by mouth daily at bedtime, Historical Med      sertraline (ZOLOFT) 100 mg tablet TAKE 2 TABLETS (200 MG TOTAL) BY MOUTH DAILY AT BEDTIME, Starting Mon 11/21/2022, Normal      venlafaxine (EFFEXOR-XR) 37.5 mg 24 hr capsule Take 75 mg by mouth every morning, Starting Wed 1/18/2023, Historical Med      Xarelto 20 MG tablet TAKE 1 TABLET BY MOUTH DAILY WITH BREAKFAST, Normal           No discharge procedures on file.    PDMP Review         Value Time User    PDMP Reviewed  Yes 7/7/2022  6:03 PM Anna Amaya MD             ED Provider  Attending physically available and evaluated Odilia Zepeda. I managed the patient along with the ED Attending.    Electronically Signed by           Paulo Hdz MD  12/20/23 6820

## 2024-01-09 NOTE — ED ATTENDING ATTESTATION
12/19/2023  I, Gildardo Madrigal MD, saw and evaluated the patient. I have discussed the patient with the resident/non-physician practitioner and agree with the resident's/non-physician practitioner's findings, Plan of Care, and MDM as documented in the resident's/non-physician practitioner's note, except where noted. All available labs and Radiology studies were reviewed.  I was present for key portions of any procedure(s) performed by the resident/non-physician practitioner and I was immediately available to provide assistance.       At this point I agree with the current assessment done in the Emergency Department.  I have conducted an independent evaluation of this patient a history and physical is as follows:    ED Course     Patient presents for evaluation due to a painful lump on her leg. Patient has hx of ovarian vein thrombosis and is concerned that she might have a blood clot. No swelling. No additional complaints. Exam: AAOx3, NAD, L leg with small bruise-like area. A/P: Leg pain. Given thrombosis history will check duplex.     Critical Care Time  Procedures

## 2024-02-08 ENCOUNTER — OFFICE VISIT (OUTPATIENT)
Dept: OBGYN CLINIC | Facility: CLINIC | Age: 34
End: 2024-02-08

## 2024-02-08 VITALS
HEIGHT: 66 IN | BODY MASS INDEX: 29.22 KG/M2 | HEART RATE: 83 BPM | SYSTOLIC BLOOD PRESSURE: 112 MMHG | WEIGHT: 181.8 LBS | DIASTOLIC BLOOD PRESSURE: 74 MMHG

## 2024-02-08 DIAGNOSIS — B96.89 BV (BACTERIAL VAGINOSIS): Primary | ICD-10-CM

## 2024-02-08 DIAGNOSIS — N76.0 BV (BACTERIAL VAGINOSIS): Primary | ICD-10-CM

## 2024-02-08 LAB
BV WHIFF TEST VAG QL: POSITIVE
CLUE CELLS SPEC QL WET PREP: POSITIVE
PH SMN: 5.5 [PH]
SL AMB POCT WET MOUNT: ABNORMAL
T VAGINALIS VAG QL WET PREP: NEGATIVE
YEAST VAG QL WET PREP: NEGATIVE

## 2024-02-08 PROCEDURE — 87210 SMEAR WET MOUNT SALINE/INK: CPT | Performed by: NURSE PRACTITIONER

## 2024-02-08 PROCEDURE — 99213 OFFICE O/P EST LOW 20 MIN: CPT | Performed by: NURSE PRACTITIONER

## 2024-02-08 RX ORDER — METRONIDAZOLE 500 MG/1
500 TABLET ORAL 2 TIMES DAILY
Qty: 14 TABLET | Refills: 0 | Status: SHIPPED | OUTPATIENT
Start: 2024-02-08 | End: 2024-02-15

## 2024-02-08 RX ORDER — CLONIDINE HYDROCHLORIDE 0.1 MG/1
0.1 TABLET ORAL EVERY 12 HOURS SCHEDULED
COMMUNITY

## 2024-02-08 RX ORDER — BUSPIRONE HYDROCHLORIDE 5 MG/1
5 TABLET ORAL 3 TIMES DAILY
COMMUNITY

## 2024-02-08 NOTE — PROGRESS NOTES
PROBLEM GYNECOLOGICAL VISIT    Odilia Zepeda is a 33 y.o. female who presents today with complaint of vaginal odor.  Her general medical history has been reviewed and she reports it as follows:    Past Medical History:   Diagnosis Date    Abnormal Pap smear of cervix     Anxiety     Depression     History of positive PPD     Hyperlipidemia     Menorrhagia     Pelvic kidney     left side    PTSD (post-traumatic stress disorder)     STD (sexually transmitted disease)     Thrombosis of ovarian vein      Past Surgical History:   Procedure Laterality Date    APPENDECTOMY      CYSTOSCOPY N/A 2022    Procedure: CYSTOSCOPY, INSERTION URETERAL CATHETERS;  Surgeon: Danny Brower MD;  [facility];  Service: Gynecology Oncology    SD COLPOSCOPY CERVIX VAG LOOP ELTRD BX CERVIX N/A 2020    Procedure: BIOPSY CONE/COLD KNIFE CERVIX, ECC;  Surgeon: Gaudencio Gonsales MD;  [facility];  Service: Gynecology    SD LAPS TOTAL HYSTERECT 250 GM/< W/RMVL TUBE/OVARY N/A 2022    Procedure: HYSTERECTOMY LAPAROSCOPIC TOTAL (LTH) W/ ROBOTICS, BILATERAL SALPINGECTOMY;  Surgeon: Danny Brower MD;  [facility];  Service: Gynecology Oncology     OB History          4    Para   4    Term   3       1    AB        Living   4         SAB        IAB        Ectopic        Multiple   0    Live Births   4               Social History     Tobacco Use    Smoking status: Some Days     Current packs/day: 0.25     Average packs/day: 0.3 packs/day for 10.0 years (2.5 ttl pk-yrs)     Types: Cigarettes    Smokeless tobacco: Never    Tobacco comments:     4 cig / day    Vaping Use    Vaping status: Never Used   Substance Use Topics    Alcohol use: Yes     Alcohol/week: 24.0 standard drinks of alcohol     Types: 24 Standard drinks or equivalent per week     Comment: socially    Drug use: No     Social History     Substance and Sexual Activity   Sexual Activity Yes    Partners: Male     "Birth control/protection: Female Sterilization       Current Outpatient Medications   Medication Instructions    ARIPiprazole (ABILIFY) 5 mg, Daily    benzonatate (TESSALON) 200 MG capsule TAKE 1 CAPSULE BY MOUTH THREE TIMES A DAY AS NEEDED FOR COUGH    buPROPion (WELLBUTRIN SR) 100 mg, Daily after breakfast    busPIRone (BUSPAR) 5 mg, Oral, 3 times daily    cloNIDine (CATAPRES) 0.1 mg, Oral, Every 12 hours scheduled    cyclobenzaprine (FLEXERIL) 10 mg, 3 times daily    diazepam (VALIUM) 5 mg, Oral, Every 8 hours PRN    gabapentin (NEURONTIN) 400 mg, Oral, 3 times daily    hydrOXYzine HCL (ATARAX) 25 mg, Oral, Every 6 hours PRN    meclizine (ANTIVERT) 12.5 MG tablet TAKE 1 TABLET (12.5 MG TOTAL) BY MOUTH EVERY 8 (EIGHT) HOURS AS NEEDED FOR DIZZINESS.    metroNIDAZOLE (FLAGYL) 500 mg, Oral, 2 times daily    naproxen (NAPROSYN) 500 mg tablet TAKE 1 TABLET BY MOUTH TWICE A DAY WITH MEALS    ondansetron (ZOFRAN) 4 mg, Oral, Every 6 hours    ondansetron (ZOFRAN-ODT) 4 mg disintegrating tablet TAKE 1 TABLET BY MOUTH EVERY 8 HOURS AS NEEDED FOR NAUSEA AND VOMITING    oxyCODONE (OXY-IR) 5 mg, Every 4 hours PRN    QUEtiapine (SEROQUEL) 50 mg, Oral, Daily at bedtime    sertraline (ZOLOFT) 200 mg, Oral, Daily at bedtime    venlafaxine (EFFEXOR-XR) 75 mg, Oral, Every morning, Patient taking 150mg    Xarelto 20 MG tablet TAKE 1 TABLET BY MOUTH DAILY WITH BREAKFAST       History of Present Illness:   Odilia presents today reporting one week of foul vaginal odor and thin, watery vaginal discharge. Odor has been significantly worse during intercourse. She denies itching, irritation or rash. No new sexual partners. No new hygiene products.     Review of Systems:  Review of Systems   Constitutional: Negative.    Gastrointestinal: Negative.    Genitourinary:  Positive for vaginal discharge.       Physical Exam:  /74 (BP Location: Right arm, Patient Position: Sitting)   Pulse 83   Ht 5' 6\" (1.676 m)   Wt 82.5 kg (181 lb 12.8 " oz)   LMP  (LMP Unknown)   BMI 29.34 kg/m²   Physical Exam  Constitutional:       Appearance: Normal appearance.   Genitourinary:      Vulva normal.      Vaginal cuff intact.     Vaginal discharge present.      Vaginal exam comments: Foul vaginal odor.      Cervix is absent.   Skin:     General: Skin is warm and dry.   Psychiatric:         Mood and Affect: Mood normal.         Behavior: Behavior normal.   Vitals reviewed.         Point of Care Testing:   -Wet mount: -yeast, +clue cells, = trich   -KOH mount: negative   -Whiff: positive    Assessment:   1. Bacterial Vaginosis     Plan:  Rx for flagyl sent to pharmacy. Do not consume alcohol while taking medication.   Vulvovaginal skin care reviewed.   Declined STD testing.  Avoid intercourse until treatment is completed.     Reviewed with patient that test results are available in [facility] immediately, but that they will not necessarily be reviewed by me immediately.  Explained that I will review results at my earliest opportunity and contact patient appropriately.

## 2024-04-23 ENCOUNTER — HOSPITAL ENCOUNTER (EMERGENCY)
Facility: HOSPITAL | Age: 34
Discharge: HOME | End: 2024-04-23
Attending: EMERGENCY MEDICINE
Payer: MEDICARE

## 2024-04-23 ENCOUNTER — APPOINTMENT (EMERGENCY)
Dept: RADIOLOGY | Facility: HOSPITAL | Age: 34
End: 2024-04-23
Payer: MEDICARE

## 2024-04-23 VITALS
OXYGEN SATURATION: 98 % | HEART RATE: 98 BPM | TEMPERATURE: 98 F | SYSTOLIC BLOOD PRESSURE: 142 MMHG | DIASTOLIC BLOOD PRESSURE: 73 MMHG | RESPIRATION RATE: 18 BRPM

## 2024-04-23 DIAGNOSIS — R07.89 ATYPICAL CHEST PAIN: Primary | ICD-10-CM

## 2024-04-23 LAB
ALBUMIN SERPL BCP-MCNC: 4.2 G/DL (ref 3.5–5)
ALP SERPL-CCNC: 64 U/L (ref 34–104)
ALT SERPL W P-5'-P-CCNC: 22 U/L (ref 7–52)
ANION GAP SERPL CALCULATED.3IONS-SCNC: 10 MMOL/L (ref 4–13)
AST SERPL W P-5'-P-CCNC: 21 U/L (ref 13–39)
ATRIAL RATE: 94 BPM
BASOPHILS # BLD AUTO: 0.07 THOUSANDS/ÂΜL (ref 0–0.1)
BASOPHILS NFR BLD AUTO: 1 % (ref 0–1)
BILIRUB SERPL-MCNC: 0.38 MG/DL (ref 0.2–1)
BUN SERPL-MCNC: 13 MG/DL (ref 5–25)
CALCIUM SERPL-MCNC: 9.3 MG/DL (ref 8.4–10.2)
CARDIAC TROPONIN I PNL SERPL HS: <2 NG/L
CHLORIDE SERPL-SCNC: 107 MMOL/L (ref 96–108)
CO2 SERPL-SCNC: 21 MMOL/L (ref 21–32)
CREAT SERPL-MCNC: 0.67 MG/DL (ref 0.6–1.3)
D DIMER PPP FEU-MCNC: <0.27 UG/ML FEU
EOSINOPHIL # BLD AUTO: 0.25 THOUSAND/ÂΜL (ref 0–0.61)
EOSINOPHIL NFR BLD AUTO: 3 % (ref 0–6)
ERYTHROCYTE [DISTWIDTH] IN BLOOD BY AUTOMATED COUNT: 12.4 % (ref 11.6–15.1)
EXT PREGNANCY TEST URINE: NEGATIVE
EXT. CONTROL: NORMAL
GFR SERPL CREATININE-BSD FRML MDRD: 115 ML/MIN/1.73SQ M
GLUCOSE SERPL-MCNC: 103 MG/DL (ref 65–140)
HCT VFR BLD AUTO: 43.6 % (ref 34.8–46.1)
HGB BLD-MCNC: 14.7 G/DL (ref 11.5–15.4)
IMM GRANULOCYTES # BLD AUTO: 0.04 THOUSAND/UL (ref 0–0.2)
IMM GRANULOCYTES NFR BLD AUTO: 0 % (ref 0–2)
LYMPHOCYTES # BLD AUTO: 2.2 THOUSANDS/ÂΜL (ref 0.6–4.47)
LYMPHOCYTES NFR BLD AUTO: 24 % (ref 14–44)
MCH RBC QN AUTO: 31.9 PG (ref 26.8–34.3)
MCHC RBC AUTO-ENTMCNC: 33.7 G/DL (ref 31.4–37.4)
MCV RBC AUTO: 95 FL (ref 82–98)
MONOCYTES # BLD AUTO: 0.83 THOUSAND/ÂΜL (ref 0.17–1.22)
MONOCYTES NFR BLD AUTO: 9 % (ref 4–12)
NEUTROPHILS # BLD AUTO: 5.84 THOUSANDS/ÂΜL (ref 1.85–7.62)
NEUTS SEG NFR BLD AUTO: 63 % (ref 43–75)
NRBC BLD AUTO-RTO: 0 /100 WBCS
P AXIS: 61 DEGREES
PLATELET # BLD AUTO: 170 THOUSANDS/UL (ref 149–390)
PMV BLD AUTO: 10.7 FL (ref 8.9–12.7)
POTASSIUM SERPL-SCNC: 4 MMOL/L (ref 3.5–5.3)
PR INTERVAL: 112 MS
PROT SERPL-MCNC: 6.7 G/DL (ref 6.4–8.4)
QRS AXIS: 76 DEGREES
QRSD INTERVAL: 92 MS
QT INTERVAL: 356 MS
QTC INTERVAL: 445 MS
RBC # BLD AUTO: 4.61 MILLION/UL (ref 3.81–5.12)
SODIUM SERPL-SCNC: 138 MMOL/L (ref 135–147)
T WAVE AXIS: 22 DEGREES
VENTRICULAR RATE: 94 BPM
WBC # BLD AUTO: 9.23 THOUSAND/UL (ref 4.31–10.16)

## 2024-04-23 PROCEDURE — 71045 X-RAY EXAM CHEST 1 VIEW: CPT

## 2024-04-23 PROCEDURE — 84484 ASSAY OF TROPONIN QUANT: CPT

## 2024-04-23 PROCEDURE — 80053 COMPREHEN METABOLIC PANEL: CPT

## 2024-04-23 PROCEDURE — 81025 URINE PREGNANCY TEST: CPT

## 2024-04-23 PROCEDURE — 93010 ELECTROCARDIOGRAM REPORT: CPT | Performed by: INTERNAL MEDICINE

## 2024-04-23 PROCEDURE — 85379 FIBRIN DEGRADATION QUANT: CPT

## 2024-04-23 PROCEDURE — 93005 ELECTROCARDIOGRAM TRACING: CPT

## 2024-04-23 PROCEDURE — 85025 COMPLETE CBC W/AUTO DIFF WBC: CPT

## 2024-04-23 PROCEDURE — 36415 COLL VENOUS BLD VENIPUNCTURE: CPT

## 2024-04-23 PROCEDURE — 99285 EMERGENCY DEPT VISIT HI MDM: CPT | Performed by: EMERGENCY MEDICINE

## 2024-04-23 PROCEDURE — 99285 EMERGENCY DEPT VISIT HI MDM: CPT

## 2024-04-23 NOTE — Clinical Note
Odilia Zepeda was seen and treated in our emergency department on 4/23/2024.                Diagnosis:     Odilia  may return to work on return date.    She may return on this date: 04/24/2024         If you have any questions or concerns, please don't hesitate to call.      Annette Maria Palladino, DO    ______________________________           _______________          _______________  Hospital Representative                              Date                                Time

## 2024-04-23 NOTE — ED ATTENDING ATTESTATION
4/23/2024  I, Nasim Cueto DO, saw and evaluated the patient. I have discussed the patient with the resident/non-physician practitioner and agree with the resident's/non-physician practitioner's findings, Plan of Care, and MDM as documented in the resident's/non-physician practitioner's note, except where noted. All available labs and Radiology studies were reviewed.  I was present for key portions of any procedure(s) performed by the resident/non-physician practitioner and I was immediately available to provide assistance.       At this point I agree with the current assessment done in the Emergency Department.  I have conducted an independent evaluation of this patient a history and physical is as follows:    Patient is a 34-year-old female with a history of anxiety, depression, PTSD, previous ovarian vein thrombosis about 4 or 5 years ago for which she was treated with Xarelto for 6 months, felt to be provoked as it was following surgery, says about 10 AM this morning to the onset of some left-sided chest discomfort, shoulder discomfort, feeling slightly short of breath.  Discomfort is mild, slightly pleuritic in nature, not knifelike, ripping, or tearing, no nausea, no diaphoresis, no exertional component.  No recent colds, flus, illnesses, or cough.  No recent travel history, no sick contacts.  no recent hospitalizations, no extended travel, no inactivity periods.     General:  Patient is well-appearing  Head:  Atraumatic  Eyes:  Conjunctiva pink  ENT:  Mucous membranes are moist  Neck:  Supple  Cardiac:  S1-S2, without murmurs  Lungs:  Clear to auscultation bilaterally  Abdomen:  Soft, nontender, normal bowel sounds, no CVA tenderness, no tympany, no rigidity, no guarding  Extremities:  Normal range of motion, no pedal edema or calf asymmetry, radial pulses are equal and symmetric bilaterally  Neurologic:  Awake, fluent speech, normal comprehension, AAOx3  Skin:  Pink warm and dry  Psychiatric:  Alert,  "pleasant, cooperative      ED Course     ECG interpreted me, sinus rhythm rate of 94, no acute ischemic or changes, no ST segment elevation depression, no acute change from January 16, 2023    Chest x-ray interpreted by me shows no acute cardiopulmonary disease    Labs Reviewed   HS TROPONIN I 0HR - Normal       Result Value Ref Range Status    hs TnI 0hr <2  \"Refer to ACS Flowchart\"- see link ng/L Final    Comment:                                              Initial (time 0) result  If >=50 ng/L, Myocardial injury suggested ;  Type of myocardial injury and treatment strategy  to be determined.  If 5-49 ng/L, a delta result at 2 hours and or 4 hours will be needed to further evaluate.  If <4 ng/L, and chest pain has been >3 hours since onset, patient may qualify for discharge based on the HEART score in the ED.  If <5 ng/L and <3hours since onset of chest pain, a delta result at 2 hours will be needed to further evaluate.    HS Troponin 99th Percentile URL of a Health Population=12 ng/L with a 95% Confidence Interval of 8-18 ng/L.    Second Troponin (time 2 hours)  If calculated delta >= 20 ng/L,  Myocardial injury suggested ; Type of myocardial injury and treatment strategy to be determined.  If 5-49 ng/L and the calculated delta is 5-19 ng/L, consult medical service for evaluation.  Continue evaluation for ischemia on ecg and other possible etiology and repeat hs troponin at 4 hours.  If delta is <5 ng/L at 2 hours, consider discharge based on risk stratification via the HEART score (if in ED), or NERI risk score in IP/Observation.    HS Troponin 99th Percentile URL of a Health Population=12 ng/L with a 95% Confidence Interval of 8-18 ng/L.   D-DIMER, QUANTITATIVE - Normal    D-Dimer, Quant <0.27  <0.50 ug/ml FEU Final    Comment: Reference and upper limits to exclude DVT and PE are the same.  Do not use to exclude if clinical symptoms are present.  Pregnant women:  1st trimester:  <0.22 - 1.06 ug/ml FEU  2nd " trimester:  <0.22 - 1.88 ug/ml FEU  3rd trimester:   0.24 - 3.28 ug/ml FEU    Note: Normal ranges may not apply to patients who are transgender, non-binary, or whose legal sex, sex at birth, and gender identity differ.     POCT PREGNANCY, URINE - Normal    EXT Preg Test, Ur Negative   Final    Control Valid   Final   CBC AND DIFFERENTIAL    WBC 9.23  4.31 - 10.16 Thousand/uL Final    RBC 4.61  3.81 - 5.12 Million/uL Final    Hemoglobin 14.7  11.5 - 15.4 g/dL Final    Hematocrit 43.6  34.8 - 46.1 % Final    MCV 95  82 - 98 fL Final    MCH 31.9  26.8 - 34.3 pg Final    MCHC 33.7  31.4 - 37.4 g/dL Final    RDW 12.4  11.6 - 15.1 % Final    MPV 10.7  8.9 - 12.7 fL Final    Platelets 170  149 - 390 Thousands/uL Final    nRBC 0  /100 WBCs Final    Segmented % 63  43 - 75 % Final    Immature Grans % 0  0 - 2 % Final    Lymphocytes % 24  14 - 44 % Final    Monocytes % 9  4 - 12 % Final    Eosinophils Relative 3  0 - 6 % Final    Basophils Relative 1  0 - 1 % Final    Absolute Neutrophils 5.84  1.85 - 7.62 Thousands/µL Final    Absolute Immature Grans 0.04  0.00 - 0.20 Thousand/uL Final    Absolute Lymphocytes 2.20  0.60 - 4.47 Thousands/µL Final    Absolute Monocytes 0.83  0.17 - 1.22 Thousand/µL Final    Eosinophils Absolute 0.25  0.00 - 0.61 Thousand/µL Final    Basophils Absolute 0.07  0.00 - 0.10 Thousands/µL Final   COMPREHENSIVE METABOLIC PANEL    Sodium 138  135 - 147 mmol/L Final    Potassium 4.0  3.5 - 5.3 mmol/L Final    Chloride 107  96 - 108 mmol/L Final    CO2 21  21 - 32 mmol/L Final    ANION GAP 10  4 - 13 mmol/L Final    BUN 13  5 - 25 mg/dL Final    Creatinine 0.67  0.60 - 1.30 mg/dL Final    Comment: Standardized to IDMS reference method    Glucose 103  65 - 140 mg/dL Final    Comment: If the patient is fasting, the ADA then defines impaired fasting glucose as > 100 mg/dL and diabetes as > or equal to 123 mg/dL.    Calcium 9.3  8.4 - 10.2 mg/dL Final    AST 21  13 - 39 U/L Final    ALT 22  7 - 52 U/L Final     Comment: Specimen collection should occur prior to Sulfasalazine administration due to the potential for falsely depressed results.     Alkaline Phosphatase 64  34 - 104 U/L Final    Total Protein 6.7  6.4 - 8.4 g/dL Final    Albumin 4.2  3.5 - 5.0 g/dL Final    Total Bilirubin 0.38  0.20 - 1.00 mg/dL Final    Comment: Use of this assay is not recommended for patients undergoing treatment with eltrombopag due to the potential for falsely elevated results.  N-acetyl-p-benzoquinone imine (metabolite of Acetaminophen) will generate erroneously low results in samples for patients that have taken an overdose of Acetaminophen.    eGFR 115  ml/min/1.73sq m Final    Narrative:     [employer] guidelines for Chronic Kidney Disease (CKD):     Stage 1 with normal or high GFR (GFR > 90 mL/min/1.73 square meters)    Stage 2 Mild CKD (GFR = 60-89 mL/min/1.73 square meters)    Stage 3A Moderate CKD (GFR = 45-59 mL/min/1.73 square meters)    Stage 3B Moderate CKD (GFR = 30-44 mL/min/1.73 square meters)    Stage 4 Severe CKD (GFR = 15-29 mL/min/1.73 square meters)    Stage 5 End Stage CKD (GFR <15 mL/min/1.73 square meters)  Note: GFR calculation is accurate only with a steady state creatinine     On reassessment there was no change in the above findings.  At this point the cause the patient's symptoms is unclear but unlikely represents an acute emergency.  With a negative D-dimer,I do not believe this patient's complaints are from pulmonary embolism and I believe they would most likely be harmed through false positive test results and other complications of testing by further pursuing the diagnosis of pulmonary embolism.  ACS considered to be unlikely given low initial troponin and duration of symptoms and lack of EKG changes.  Do not believe this represents acute aortic dissection. While the cause of the patient's complaints is most likely benign, it is possible that this is the early presentation of a  more serious condition. This diagnostic uncertainty was discussed with the patient, as was the importance of follow up care, as well as the need to return to immediately return to the closest emergency department for the signs/symptoms in the discharge instruction sheets, or they were otherwise concerned about their medical condition. The patient stated they were aware of this diagnostic uncertainty, understood the importance of follow up and were comfortable being discharged.  Supportive care, importance of follow-up and return precautions were discussed with the patient, who expressed understanding.      MEDICAL DECISION MAKING CODING    Patient presents with acute new problem with:  Threat to life or bodily function    Chronic conditions affecting care: As per HPI    COLLECTION AND INTERPRETATION OF DATA  I reviewed prior external notes, including previous ECG as noted above from January 16, 2023    I ordered each unique test  Tests reviewed personally by me:  ECG: See my ED course  Labs: See above  Imaging: I independently reviewed the x-ray and found no acute pathology.    Tests considered but not ordered: See above regarding PE    RISK  All of the patient's current prescription medications should be continued.    Treatment:    Surgery  -I considered surgery may be necessary prior to completion of the work up but afterwards there is no indication for immediate surgery        Critical Care Time  Procedures

## 2024-04-24 NOTE — ED PROVIDER NOTES
History  Chief Complaint   Patient presents with    Chest Pain     States started today, feeling SOB. Endorses nausea     Patient is a 34-year-old female with a history of anxiety, depression, PTSD, presenting to the emergency department this a.m. for left-sided chest discomfort that started around 10 AM.  Notes that it is just located on the left side there is no radiating symptoms.  No cough congestion shortness of breath associated with it.  No recent colds, flus, illnesses, or cough.  No recent travel history, no sick contacts.  no recent hospitalizations, no extended travel, no inactivity periods.         Prior to Admission Medications   Prescriptions Last Dose Informant Patient Reported? Taking?   ARIPiprazole (ABILIFY) 5 mg tablet   Yes No   Sig: Take 5 mg by mouth daily   Patient not taking: Reported on 12/13/2023   QUEtiapine (SEROquel) 25 mg tablet  Self Yes No   Sig: Take 50 mg by mouth daily at bedtime   Xarelto 20 MG tablet   No No   Sig: TAKE 1 TABLET BY MOUTH DAILY WITH BREAKFAST   Patient not taking: Reported on 6/7/2023   benzonatate (TESSALON) 200 MG capsule   Yes No   Sig: TAKE 1 CAPSULE BY MOUTH THREE TIMES A DAY AS NEEDED FOR COUGH   Patient not taking: Reported on 12/13/2023   buPROPion (WELLBUTRIN SR) 100 mg 12 hr tablet  Self Yes No   Sig: Take 100 mg by mouth daily after breakfast   Patient not taking: Reported on 6/7/2023   busPIRone (BUSPAR) 5 mg tablet  Self Yes No   Sig: Take 5 mg by mouth 3 (three) times a day   cloNIDine (CATAPRES) 0.1 mg tablet  Self Yes No   Sig: Take 0.1 mg by mouth every 12 (twelve) hours   cyclobenzaprine (FLEXERIL) 10 mg tablet   Yes No   Sig: Take 10 mg by mouth 3 (three) times a day   Patient not taking: Reported on 8/3/2023   diazepam (VALIUM) 5 mg tablet   Yes No   Sig: Take 5 mg by mouth every 8 (eight) hours as needed   gabapentin (NEURONTIN) 400 mg capsule   Yes No   Sig: Take 400 mg by mouth 3 (three) times a day   hydrOXYzine HCL (ATARAX) 50 mg tablet    No No   Sig: TAKE 0.5 TABLETS (25 MG TOTAL) BY MOUTH EVERY 6 (SIX) HOURS AS NEEDED FOR ANXIETY   meclizine (ANTIVERT) 12.5 MG tablet   Yes No   Sig: TAKE 1 TABLET (12.5 MG TOTAL) BY MOUTH EVERY 8 (EIGHT) HOURS AS NEEDED FOR DIZZINESS.   Patient not taking: Reported on 8/3/2023   naproxen (NAPROSYN) 500 mg tablet   No No   Sig: TAKE 1 TABLET BY MOUTH TWICE A DAY WITH MEALS   Patient not taking: Reported on 6/7/2023   ondansetron (ZOFRAN) 4 mg tablet  Self No No   Sig: Take 1 tablet (4 mg total) by mouth every 6 (six) hours   Patient not taking: Reported on 6/7/2023   ondansetron (ZOFRAN-ODT) 4 mg disintegrating tablet  Self Yes No   Sig: TAKE 1 TABLET BY MOUTH EVERY 8 HOURS AS NEEDED FOR NAUSEA AND VOMITING   Patient not taking: Reported on 6/7/2023   oxyCODONE (OXY-IR) 5 MG capsule   Yes No   Sig: Take 5 mg by mouth every 4 (four) hours as needed   Patient not taking: Reported on 8/3/2023   sertraline (ZOLOFT) 100 mg tablet  Self No No   Sig: TAKE 2 TABLETS (200 MG TOTAL) BY MOUTH DAILY AT BEDTIME   Patient not taking: Reported on 6/7/2023   venlafaxine (EFFEXOR-XR) 37.5 mg 24 hr capsule  Self Yes No   Sig: Take 75 mg by mouth every morning Patient taking 150mg      Facility-Administered Medications: None       Past Medical History:   Diagnosis Date    Abnormal Pap smear of cervix     Anxiety     Depression     History of positive PPD     Hyperlipidemia     Menorrhagia     Pelvic kidney     left side    PTSD (post-traumatic stress disorder)     STD (sexually transmitted disease)     Thrombosis of ovarian vein        Past Surgical History:   Procedure Laterality Date    APPENDECTOMY      CYSTOSCOPY N/A 12/19/2022    Procedure: CYSTOSCOPY, INSERTION URETERAL CATHETERS;  Surgeon: Danny Brower MD;  Location: [facility];  Service: Gynecology Oncology    VA COLPOSCOPY CERVIX VAG LOOP ELTRD BX CERVIX N/A 7/31/2020    Procedure: BIOPSY CONE/COLD KNIFE CERVIX, ECC;  Surgeon: Gaudencio Gonsales MD;  Location: [facility];  Service: Gynecology    OK LAPS TOTAL HYSTERECT 250 GM/< W/RMVL TUBE/OVARY N/A 12/19/2022    Procedure: HYSTERECTOMY LAPAROSCOPIC TOTAL (LTH) W/ ROBOTICS, BILATERAL SALPINGECTOMY;  Surgeon: Danny Brower MD;  Location: [facility];  Service: Gynecology Oncology       Family History   Problem Relation Age of Onset    Crohn's disease Brother     Crohn's disease Maternal Aunt     Diabetes Maternal Grandmother     Clotting disorder Maternal Grandmother     Hypertension Maternal Grandmother     Diabetes Maternal Grandfather     Diabetes Paternal Grandmother     Diabetes Paternal Grandfather     COPD Mother     Diabetes Father     Hypertension Father     Depression Sister      I have reviewed and agree with the history as documented.    E-Cigarette/Vaping    E-Cigarette Use Never User      E-Cigarette/Vaping Substances    Nicotine No     THC No     CBD No     Flavoring No     Other No     Unknown No      Social History     Tobacco Use    Smoking status: Some Days     Current packs/day: 0.25     Average packs/day: 0.3 packs/day for 10.0 years (2.5 ttl pk-yrs)     Types: Cigarettes    Smokeless tobacco: Never    Tobacco comments:     4 cig / day    Vaping Use    Vaping status: Never Used   Substance Use Topics    Alcohol use: Yes     Alcohol/week: 24.0 standard drinks of alcohol     Types: 24 Standard drinks or equivalent per week     Comment: socially    Drug use: No        Review of Systems   Constitutional:  Negative for activity change, chills and fever.   HENT:  Negative for ear pain and sore throat.    Eyes:  Negative for pain and visual disturbance.   Respiratory:  Positive for chest tightness. Negative for cough and shortness of breath.    Cardiovascular:  Negative for chest pain and palpitations.   Gastrointestinal:  Negative for abdominal pain, constipation, diarrhea, nausea and vomiting.   Genitourinary:  Negative for dysuria and hematuria.   Musculoskeletal:  Negative for arthralgias, back pain and  neck pain.   Skin:  Negative for color change and rash.   Neurological:  Negative for dizziness, seizures, syncope, weakness, light-headedness, numbness and headaches.   Psychiatric/Behavioral:  Negative for agitation and behavioral problems.    All other systems reviewed and are negative.      Physical Exam  ED Triage Vitals [04/23/24 1257]   Temperature Pulse Respirations Blood Pressure SpO2   98 °F (36.7 °C) 98 18 142/73 98 %      Temp src Heart Rate Source Patient Position - Orthostatic VS BP Location FiO2 (%)   -- Monitor -- -- --      Pain Score       --             Orthostatic Vital Signs  Vitals:    04/23/24 1257   BP: 142/73   Pulse: 98       Physical Exam  Vitals and nursing note reviewed.   Constitutional:       General: She is not in acute distress.     Appearance: She is well-developed.   HENT:      Head: Normocephalic and atraumatic.   Eyes:      Extraocular Movements: Extraocular movements intact.      Conjunctiva/sclera: Conjunctivae normal.      Pupils: Pupils are equal, round, and reactive to light.   Cardiovascular:      Rate and Rhythm: Normal rate and regular rhythm.      Heart sounds: Normal heart sounds. No murmur heard.  Pulmonary:      Effort: Pulmonary effort is normal. No respiratory distress.      Breath sounds: Normal breath sounds. No decreased breath sounds, wheezing, rhonchi or rales.   Abdominal:      Palpations: Abdomen is soft.      Tenderness: There is no abdominal tenderness.   Musculoskeletal:         General: No swelling.      Cervical back: Normal range of motion and neck supple.      Right lower leg: No tenderness. No edema.      Left lower leg: No tenderness. No edema.   Skin:     General: Skin is warm and dry.      Capillary Refill: Capillary refill takes less than 2 seconds.   Neurological:      General: No focal deficit present.      Mental Status: She is alert.   Psychiatric:         Mood and Affect: Mood normal.         ED Medications  Medications - No data to  display    Diagnostic Studies  Results Reviewed       Procedure Component Value Units Date/Time    POCT pregnancy, urine [[id number]]  (Normal) Resulted: 04/23/24 1422    Lab Status: Final result Updated: 04/23/24 1422     EXT Preg Test, Ur Negative     Control Valid    Comprehensive metabolic panel [[id number]] Collected: 04/23/24 1329    Lab Status: Final result Specimen: Blood from Arm, Left Updated: 04/23/24 1415     Sodium 138 mmol/L      Potassium 4.0 mmol/L      Chloride 107 mmol/L      CO2 21 mmol/L      ANION GAP 10 mmol/L      BUN 13 mg/dL      Creatinine 0.67 mg/dL      Glucose 103 mg/dL      Calcium 9.3 mg/dL      AST 21 U/L      ALT 22 U/L      Alkaline Phosphatase 64 U/L      Total Protein 6.7 g/dL      Albumin 4.2 g/dL      Total Bilirubin 0.38 mg/dL      eGFR 115 ml/min/1.73sq m     Narrative:      National Kidney Disease Foundation guidelines for Chronic Kidney Disease (CKD):     Stage 1 with normal or high GFR (GFR > 90 mL/min/1.73 square meters)    Stage 2 Mild CKD (GFR = 60-89 mL/min/1.73 square meters)    Stage 3A Moderate CKD (GFR = 45-59 mL/min/1.73 square meters)    Stage 3B Moderate CKD (GFR = 30-44 mL/min/1.73 square meters)    Stage 4 Severe CKD (GFR = 15-29 mL/min/1.73 square meters)    Stage 5 End Stage CKD (GFR <15 mL/min/1.73 square meters)  Note: GFR calculation is accurate only with a steady state creatinine    D-dimer, quantitative [[id number]]  (Normal) Collected: 04/23/24 1329    Lab Status: Final result Specimen: Blood from Arm, Left Updated: 04/23/24 1406     D-Dimer, Quant <0.27 ug/ml FEU     HS Troponin 0hr (reflex protocol) [[id number]]  (Normal) Collected: 04/23/24 1329    Lab Status: Final result Specimen: Blood from Arm, Left Updated: 04/23/24 1403     hs TnI 0hr <2 ng/L     CBC and differential [[id number]] Collected: 04/23/24 1329    Lab Status: Final result Specimen: Blood from Arm, Left Updated: 04/23/24 1351     WBC 9.23 Thousand/uL      RBC 4.61 Million/uL      Hemoglobin  14.7 g/dL      Hematocrit 43.6 %      MCV 95 fL      MCH 31.9 pg      MCHC 33.7 g/dL      RDW 12.4 %      MPV 10.7 fL      Platelets 170 Thousands/uL      nRBC 0 /100 WBCs      Segmented % 63 %      Immature Grans % 0 %      Lymphocytes % 24 %      Monocytes % 9 %      Eosinophils Relative 3 %      Basophils Relative 1 %      Absolute Neutrophils 5.84 Thousands/µL      Absolute Immature Grans 0.04 Thousand/uL      Absolute Lymphocytes 2.20 Thousands/µL      Absolute Monocytes 0.83 Thousand/µL      Eosinophils Absolute 0.25 Thousand/µL      Basophils Absolute 0.07 Thousands/µL                    XR chest 1 view portable   ED Interpretation by Annette Maria Palladino, DO (04/23 1352)   No acute cardiopulm process noted      Final Result by Efrain Vasquez MD (04/23 1658)      No acute cardiopulmonary disease.            Workstation performed: [id number]               Procedures  ECG 12 Lead Documentation Only    Date/Time: 4/23/2024 1:01 PM    Performed by: Annette Maria Palladino, DO  Authorized by: Annette Maria Palladino, DO    Indications / Diagnosis:  SOB  ECG reviewed by me, the ED Provider: yes    Patient location:  ED  Previous ECG:     Previous ECG:  Compared to current    Similarity:  No change  Interpretation:     Interpretation: normal    Rate:     ECG rate:  94    ECG rate assessment: normal    Rhythm:     Rhythm: sinus rhythm    Ectopy:     Ectopy: none    QRS:     QRS axis:  Normal    QRS intervals:  Normal  Conduction:     Conduction: normal    ST segments:     ST segments:  Normal  T waves:     T waves: normal          ED Course  ED Course as of 04/24/24 1022   Tue Apr 23, 2024   1351 WBC: 9.23   1351 Hemoglobin: 14.7   1418 D-Dimer, Quant: <0.27   1418 hs TnI 0hr: <2   1418 Sodium: 138             HEART Risk Score      Flowsheet Row Most Recent Value   Heart Score Risk Calculator    History 0 Filed at: 04/23/2024 1420   ECG 0 Filed at: 04/23/2024 1420   Age 1 Filed at: 04/23/2024 1420   Risk  Factors 0 Filed at: 04/23/2024 1420   Troponin 0 Filed at: 04/23/2024 1420   HEART Score 1 Filed at: 04/23/2024 1420                PERC Rule for PE      Flowsheet Row Most Recent Value   PERC Rule for PE    Age >=50 0 Filed at: 04/23/2024 1300   HR >=100 0 Filed at: 04/23/2024 1300   O2 Sat on room air < 95% 0 Filed at: 04/23/2024 1300   History of PE or DVT 1 Filed at: 04/23/2024 1300   Recent trauma or surgery 0 Filed at: 04/23/2024 1300   Hemoptysis 0 Filed at: 04/23/2024 1300   Exogenous estrogen 0 Filed at: 04/23/2024 1300   Unilateral leg swelling 0 Filed at: 04/23/2024 1300   PERC Rule for PE Results 1 Filed at: 04/23/2024 1300                SBIRT 20yo+      Flowsheet Row Most Recent Value   Initial Alcohol Screen: US AUDIT-C     1. How often do you have a drink containing alcohol? 3 Filed at: 04/23/2024 1332   2. How many drinks containing alcohol do you have on a typical day you are drinking?  1 Filed at: 04/23/2024 1332   3a. Male UNDER 65: How often do you have five or more drinks on one occasion? 0 Filed at: 04/23/2024 1332   3b. FEMALE Any Age, or MALE 65+: How often do you have 4 or more drinks on one occassion? 0 Filed at: 04/23/2024 1332   Audit-C Score 4 Filed at: 04/23/2024 1332   ЕКАТЕРИНА: How many times in the past year have you...    Used an illegal drug or used a prescription medication for non-medical reasons? Never Filed at: 04/23/2024 1332                  Medical Decision Making  - Given patient's concerns, will do a cardiac workup.   - Will do an EKG for arrythmia, strain; troponin for same as per protocol for evaluation of ACS.   - CBC for anemia; CMP for kidney function and electrolytes.   - Will check CXR for pneumonia, PTX, fluid overload'  - HEART score:  - d dimer to r/o PE      Upon re-evaluation patient resting comfortably. Patient aware of all lab and imaging findings. Advised to follow up with PCP in a few days for re-evaluation. Advised to come back with any new worsening or  concnering symptoms. Patient stable for discharge.       Amount and/or Complexity of Data Reviewed  Labs: ordered. Decision-making details documented in ED Course.  Radiology: ordered and independent interpretation performed.          Disposition  Final diagnoses:   Atypical chest pain     Time reflects when diagnosis was documented in both MDM as applicable and the Disposition within this note       Time User Action Codes Description Comment    4/23/2024  2:19 PM PalladinoTerri Add [R07.89] Atypical chest pain           ED Disposition       ED Disposition   Discharge    Condition   Stable    Date/Time   Tue Apr 23, 2024 1419    Comment   Odilia Zaira Duane discharge to home/self care.                   Follow-up Information       Follow up With Specialties Details Why Contact Info Additional Information    [facility] Emergency Medicine Go to  As needed, If symptoms worsen [address] [facility] [place] [address]  [phone] [facility], [address] [place], [place], [address]   [phone]    [facility] Internal Medicine Schedule an appointment as soon as possible for a visit  for follow up [address] [place] [id number] [phone] [facility], [address] [place] [address], [address] [phone]            Discharge Medication List as of 4/23/2024  2:19 PM        CONTINUE these medications which have NOT CHANGED    Details   ARIPiprazole (ABILIFY) 5 mg tablet Take 5 mg by mouth daily, Starting Mon 5/1/2023, Historical Med      benzonatate (TESSALON) 200 MG capsule TAKE 1 CAPSULE BY MOUTH THREE TIMES A DAY AS NEEDED FOR COUGH, Historical Med      buPROPion (WELLBUTRIN SR) 100 mg 12 hr tablet Take 100 mg by mouth daily after breakfast, Historical Med      busPIRone (BUSPAR) 5 mg tablet Take 5 mg by  mouth 3 (three) times a day, Historical Med      cloNIDine (CATAPRES) 0.1 mg tablet Take 0.1 mg by mouth every 12 (twelve) hours, Historical Med      cyclobenzaprine (FLEXERIL) 10 mg tablet Take 10 mg by mouth 3 (three) times a day, Starting Sun 6/4/2023, Historical Med      diazepam (VALIUM) 5 mg tablet Take 5 mg by mouth every 8 (eight) hours as needed, Starting Sun 6/4/2023, Historical Med      gabapentin (NEURONTIN) 400 mg capsule Take 400 mg by mouth 3 (three) times a day, Starting Tue 5/16/2023, Historical Med      hydrOXYzine HCL (ATARAX) 50 mg tablet TAKE 0.5 TABLETS (25 MG TOTAL) BY MOUTH EVERY 6 (SIX) HOURS AS NEEDED FOR ANXIETY, Starting Tue 8/1/2023, Until Sun 1/28/2024 at 2359, Normal      meclizine (ANTIVERT) 12.5 MG tablet TAKE 1 TABLET (12.5 MG TOTAL) BY MOUTH EVERY 8 (EIGHT) HOURS AS NEEDED FOR DIZZINESS., Historical Med      naproxen (NAPROSYN) 500 mg tablet TAKE 1 TABLET BY MOUTH TWICE A DAY WITH MEALS, Normal      ondansetron (ZOFRAN) 4 mg tablet Take 1 tablet (4 mg total) by mouth every 6 (six) hours, Starting Mon 1/16/2023, Normal      ondansetron (ZOFRAN-ODT) 4 mg disintegrating tablet TAKE 1 TABLET BY MOUTH EVERY 8 HOURS AS NEEDED FOR NAUSEA AND VOMITING, Historical Med      oxyCODONE (OXY-IR) 5 MG capsule Take 5 mg by mouth every 4 (four) hours as needed, Starting Wed 6/7/2023, Historical Med      QUEtiapine (SEROquel) 25 mg tablet Take 50 mg by mouth daily at bedtime, Historical Med      sertraline (ZOLOFT) 100 mg tablet TAKE 2 TABLETS (200 MG TOTAL) BY MOUTH DAILY AT BEDTIME, Starting Mon 11/21/2022, Normal      venlafaxine (EFFEXOR-XR) 37.5 mg 24 hr capsule Take 75 mg by mouth every morning Patient taking 150mg, Starting Wed 1/18/2023, Historical Med      Xarelto 20 MG tablet TAKE 1 TABLET BY MOUTH DAILY WITH BREAKFAST, Normal           No discharge procedures on file.    PDMP Review         Value Time User    PDMP Reviewed  Yes 7/7/2022  6:03 PM Anna Amaya MD             ED  Provider  Attending physically available and evaluated Odilia Zepeda. I managed the patient along with the ED Attending.    Electronically Signed by           Annette Maria Palladino, DO  04/24/24 4479

## 2024-06-24 ENCOUNTER — HOSPITAL ENCOUNTER (EMERGENCY)
Facility: HOSPITAL | Age: 34
Discharge: HOME | End: 2024-06-25
Attending: EMERGENCY MEDICINE
Payer: MEDICARE

## 2024-06-24 VITALS
OXYGEN SATURATION: 100 % | RESPIRATION RATE: 20 BRPM | HEART RATE: 68 BPM | TEMPERATURE: 97.9 F | SYSTOLIC BLOOD PRESSURE: 109 MMHG | DIASTOLIC BLOOD PRESSURE: 54 MMHG

## 2024-06-24 DIAGNOSIS — R55 SYNCOPE: ICD-10-CM

## 2024-06-24 DIAGNOSIS — F41.9 ANXIETY: Primary | ICD-10-CM

## 2024-06-24 PROCEDURE — 93005 ELECTROCARDIOGRAM TRACING: CPT

## 2024-06-24 PROCEDURE — 99284 EMERGENCY DEPT VISIT MOD MDM: CPT

## 2024-06-24 PROCEDURE — 99285 EMERGENCY DEPT VISIT HI MDM: CPT | Performed by: EMERGENCY MEDICINE

## 2024-06-24 PROCEDURE — 96374 THER/PROPH/DIAG INJ IV PUSH: CPT

## 2024-06-24 RX ORDER — LORAZEPAM 2 MG/ML
1 INJECTION INTRAMUSCULAR ONCE
Status: COMPLETED | OUTPATIENT
Start: 2024-06-24 | End: 2024-06-24

## 2024-06-24 RX ADMIN — LORAZEPAM 1 MG: 2 INJECTION INTRAMUSCULAR; INTRAVENOUS at 23:32

## 2024-06-25 ENCOUNTER — APPOINTMENT (EMERGENCY)
Dept: RADIOLOGY | Facility: HOSPITAL | Age: 34
End: 2024-06-25
Payer: MEDICARE

## 2024-06-25 LAB
ATRIAL RATE: 107 BPM
B-HCG SERPL-ACNC: <0.6 MIU/ML (ref 0–5)
BASOPHILS # BLD AUTO: 0.06 THOUSANDS/ÂΜL (ref 0–0.1)
BASOPHILS NFR BLD AUTO: 1 % (ref 0–1)
EOSINOPHIL # BLD AUTO: 0.13 THOUSAND/ÂΜL (ref 0–0.61)
EOSINOPHIL NFR BLD AUTO: 1 % (ref 0–6)
ERYTHROCYTE [DISTWIDTH] IN BLOOD BY AUTOMATED COUNT: 12.6 % (ref 11.6–15.1)
HCT VFR BLD AUTO: 41 % (ref 34.8–46.1)
HGB BLD-MCNC: 13.8 G/DL (ref 11.5–15.4)
IMM GRANULOCYTES # BLD AUTO: 0.06 THOUSAND/UL (ref 0–0.2)
IMM GRANULOCYTES NFR BLD AUTO: 1 % (ref 0–2)
LYMPHOCYTES # BLD AUTO: 1.72 THOUSANDS/ÂΜL (ref 0.6–4.47)
LYMPHOCYTES NFR BLD AUTO: 15 % (ref 14–44)
MCH RBC QN AUTO: 32 PG (ref 26.8–34.3)
MCHC RBC AUTO-ENTMCNC: 33.7 G/DL (ref 31.4–37.4)
MCV RBC AUTO: 95 FL (ref 82–98)
MONOCYTES # BLD AUTO: 0.91 THOUSAND/ÂΜL (ref 0.17–1.22)
MONOCYTES NFR BLD AUTO: 8 % (ref 4–12)
NEUTROPHILS # BLD AUTO: 8.49 THOUSANDS/ÂΜL (ref 1.85–7.62)
NEUTS SEG NFR BLD AUTO: 74 % (ref 43–75)
NRBC BLD AUTO-RTO: 0 /100 WBCS
P AXIS: 52 DEGREES
PLATELET # BLD AUTO: 217 THOUSANDS/UL (ref 149–390)
PMV BLD AUTO: 10.5 FL (ref 8.9–12.7)
PR INTERVAL: 128 MS
QRS AXIS: 53 DEGREES
QRSD INTERVAL: 94 MS
QT INTERVAL: 346 MS
QTC INTERVAL: 461 MS
RBC # BLD AUTO: 4.31 MILLION/UL (ref 3.81–5.12)
T WAVE AXIS: 20 DEGREES
VENTRICULAR RATE: 107 BPM
WBC # BLD AUTO: 11.37 THOUSAND/UL (ref 4.31–10.16)

## 2024-06-25 PROCEDURE — 93010 ELECTROCARDIOGRAM REPORT: CPT | Performed by: INTERNAL MEDICINE

## 2024-06-25 PROCEDURE — 84702 CHORIONIC GONADOTROPIN TEST: CPT

## 2024-06-25 PROCEDURE — 85025 COMPLETE CBC W/AUTO DIFF WBC: CPT

## 2024-06-25 PROCEDURE — 71046 X-RAY EXAM CHEST 2 VIEWS: CPT

## 2024-06-25 PROCEDURE — 36415 COLL VENOUS BLD VENIPUNCTURE: CPT

## 2024-06-25 NOTE — ED ATTENDING ATTESTATION
6/24/2024  I, Curt Sharpe MD, saw and evaluated the patient. I have discussed the patient with the resident/non-physician practitioner and agree with the resident's/non-physician practitioner's findings, Plan of Care, and MDM as documented in the resident's/non-physician practitioner's note, except where noted. All available labs and Radiology studies were reviewed.  I was present for key portions of any procedure(s) performed by the resident/non-physician practitioner and I was immediately available to provide assistance.       At this point I agree with the current assessment done in the Emergency Department.  I have conducted an independent evaluation of this patient a history and physical is as follows:    34-year-old female with anxiety presents to the emergency department for evaluation following a panic attack at home.  Patient reportedly had a syncopal event.  No reported seizure-like activity.  No recent illnesses.  Nominal pain, nausea or vomiting.    On exam, patient anxious appearing, but in no acute distress, head is normocephalic atraumatic, pupils equal round reactive, neck is supple without meningismus signs, heart is regular rate and rhythm with intact distal pulses, no increased work of breathing or respiratory distress, or stridor.    Suspect symptoms likely secondary to anxiety and possible hyperventilation, will check EKG to evaluate for arrhythmia.  CBC to evaluate for symptomatic anemia.  Will treat with Ativan and reassess.    Workup unremarkable, patient remained hemodynamically stable while in the emergency department.  Stable for discharge with outpatient follow-up.    ED Course  ED Course as of 06/25/24 0154   Mon Jun 24, 2024   2347 EKG interpreted by myself demonstrates sinus tachycardia with a rate of 107, normal axis, normal intervals, normal ST segment and T waves   Tue Jun 25, 2024   0058 No acute cardiopulmonary abnormality per my interpretation of chest x-ray   0107  Hemoglobin: 13.8         Critical Care Time  Procedures

## 2024-06-25 NOTE — DISCHARGE INSTRUCTIONS
You were seen and evaluated in the emergency department.  EKG normal chest x-ray normal.  Please follow-up with your PCP.  Symptoms worsen or persist please return to the emergency department further evaluation management

## 2024-06-25 NOTE — ED PROVIDER NOTES
History  Chief Complaint   Patient presents with    Syncope     Pt was having panic attack at home, took 0.5 ativan, passed out, mom thought she  and started CPR, pt now c/o chest pain     Patient is a 34-year-old female past medical history of anxiety presenting for evaluation of syncope.  Patient states that she was having a panic attack at home took some Ativan and then had a syncopal event shortly thereafter.  She was experiencing chest tightness shortness of breath hyperventilation nausea at that time.  Patient's mother who accompanies her states that she was called by her granddaughter the patient's daughter and was told that the patient was unconscious she states that she did live 10 minutes away so drove over and when she got to the patient's house she found her unconscious.  She called 911 who recommended that they lay the patient on her back and began chest compressions.  Patient's grandmother states that she started CPR and almost immediately thereafter the patient woke up.  Patient now complaining of some chest wall pain and anxiety.  She is hyperventilating and crying          Prior to Admission Medications   Prescriptions Last Dose Informant Patient Reported? Taking?   ARIPiprazole (ABILIFY) 5 mg tablet   Yes No   Sig: Take 5 mg by mouth daily   Patient not taking: Reported on 2023   QUEtiapine (SEROquel) 25 mg tablet  Self Yes No   Sig: Take 50 mg by mouth daily at bedtime   Xarelto 20 MG tablet   No No   Sig: TAKE 1 TABLET BY MOUTH DAILY WITH BREAKFAST   Patient not taking: Reported on 2023   benzonatate (TESSALON) 200 MG capsule   Yes No   Sig: TAKE 1 CAPSULE BY MOUTH THREE TIMES A DAY AS NEEDED FOR COUGH   Patient not taking: Reported on 2023   buPROPion (WELLBUTRIN SR) 100 mg 12 hr tablet  Self Yes No   Sig: Take 100 mg by mouth daily after breakfast   Patient not taking: Reported on 2023   busPIRone (BUSPAR) 5 mg tablet  Self Yes No   Sig: Take 5 mg by mouth 3 (three)  times a day   cloNIDine (CATAPRES) 0.1 mg tablet  Self Yes No   Sig: Take 0.1 mg by mouth every 12 (twelve) hours   cyclobenzaprine (FLEXERIL) 10 mg tablet   Yes No   Sig: Take 10 mg by mouth 3 (three) times a day   Patient not taking: Reported on 8/3/2023   diazepam (VALIUM) 5 mg tablet   Yes No   Sig: Take 5 mg by mouth every 8 (eight) hours as needed   gabapentin (NEURONTIN) 400 mg capsule   Yes No   Sig: Take 400 mg by mouth 3 (three) times a day   hydrOXYzine HCL (ATARAX) 50 mg tablet   No No   Sig: TAKE 0.5 TABLETS (25 MG TOTAL) BY MOUTH EVERY 6 (SIX) HOURS AS NEEDED FOR ANXIETY   meclizine (ANTIVERT) 12.5 MG tablet   Yes No   Sig: TAKE 1 TABLET (12.5 MG TOTAL) BY MOUTH EVERY 8 (EIGHT) HOURS AS NEEDED FOR DIZZINESS.   Patient not taking: Reported on 8/3/2023   naproxen (NAPROSYN) 500 mg tablet   No No   Sig: TAKE 1 TABLET BY MOUTH TWICE A DAY WITH MEALS   Patient not taking: Reported on 6/7/2023   ondansetron (ZOFRAN) 4 mg tablet  Self No No   Sig: Take 1 tablet (4 mg total) by mouth every 6 (six) hours   Patient not taking: Reported on 6/7/2023   ondansetron (ZOFRAN-ODT) 4 mg disintegrating tablet  Self Yes No   Sig: TAKE 1 TABLET BY MOUTH EVERY 8 HOURS AS NEEDED FOR NAUSEA AND VOMITING   Patient not taking: Reported on 6/7/2023   oxyCODONE (OXY-IR) 5 MG capsule   Yes No   Sig: Take 5 mg by mouth every 4 (four) hours as needed   Patient not taking: Reported on 8/3/2023   sertraline (ZOLOFT) 100 mg tablet  Self No No   Sig: TAKE 2 TABLETS (200 MG TOTAL) BY MOUTH DAILY AT BEDTIME   Patient not taking: Reported on 6/7/2023   venlafaxine (EFFEXOR-XR) 37.5 mg 24 hr capsule  Self Yes No   Sig: Take 75 mg by mouth every morning Patient taking 150mg      Facility-Administered Medications: None       Past Medical History:   Diagnosis Date    Abnormal Pap smear of cervix     Anxiety     Depression     History of positive PPD     Hyperlipidemia     Menorrhagia     Pelvic kidney     left side    PTSD (post-traumatic  stress disorder)     STD (sexually transmitted disease)     Thrombosis of ovarian vein        Past Surgical History:   Procedure Laterality Date    APPENDECTOMY      CYSTOSCOPY N/A 12/19/2022    Procedure: CYSTOSCOPY, INSERTION URETERAL CATHETERS;  Surgeon: Danny Brower MD;  [facility];  Service: Gynecology Oncology    [place] COLPOSCOPY CERVIX VAG LOOP ELTRD BX CERVIX N/A 7/31/2020    Procedure: BIOPSY CONE/COLD KNIFE CERVIX, ECC;  Surgeon: Gaudencio Gonsales MD;  [facility]: [facility];  Service: Gynecology    [place] LAPS TOTAL HYSTERECT 250 GM/< W/RMVL TUBE/OVARY N/A 12/19/2022    Procedure: HYSTERECTOMY LAPAROSCOPIC TOTAL (LTH) W/ ROBOTICS, BILATERAL SALPINGECTOMY;  Surgeon: Danny Brower MD;  [facility];  Service: Gynecology Oncology       Family History   Problem Relation Age of Onset    Crohn's disease Brother     Crohn's disease Maternal Aunt     Diabetes Maternal Grandmother     Clotting disorder Maternal Grandmother     Hypertension Maternal Grandmother     Diabetes Maternal Grandfather     Diabetes Paternal Grandmother     Diabetes Paternal Grandfather     COPD Mother     Diabetes Father     Hypertension Father     Depression Sister      I have reviewed and agree with the history as documented.    E-Cigarette/Vaping    E-Cigarette Use Never User      E-Cigarette/Vaping Substances    Nicotine No     THC No     CBD No     Flavoring No     Other No     Unknown No      Social History     Tobacco Use    Smoking status: Some Days     Current packs/day: 0.25     Average packs/day: 0.3 packs/day for 10.0 years (2.5 ttl pk-yrs)     Types: Cigarettes    Smokeless tobacco: Never    Tobacco comments:     4 cig / day    Vaping Use    Vaping status: Never Used   Substance Use Topics    Alcohol use: Yes     Alcohol/week: 24.0 standard drinks of alcohol     Types: 24 Standard drinks or equivalent per week     Comment: socially    Drug use: No        Review of Systems   Constitutional:   Negative for chills and fever.   HENT:  Negative for ear pain and sore throat.    Eyes:  Negative for pain and visual disturbance.   Respiratory:  Positive for chest tightness and shortness of breath. Negative for cough.    Cardiovascular:  Positive for chest pain. Negative for palpitations.   Gastrointestinal:  Positive for nausea. Negative for abdominal pain and vomiting.   Genitourinary:  Negative for dysuria and hematuria.   Musculoskeletal:  Negative for arthralgias and back pain.   Skin:  Negative for color change and rash.   Neurological:  Positive for syncope. Negative for seizures.   All other systems reviewed and are negative.      Physical Exam  ED Triage Vitals [06/24/24 2300]   Temperature Pulse Respirations Blood Pressure SpO2   97.9 °F (36.6 °C) 68 20 109/54 100 %      Temp Source Heart Rate Source Patient Position - Orthostatic VS BP Location FiO2 (%)   Oral Monitor Lying Left arm --      Pain Score       --             Orthostatic Vital Signs  Vitals:    06/24/24 2300   BP: 109/54   Pulse: 68   Patient Position - Orthostatic VS: Lying       Physical Exam  Vitals and nursing note reviewed.   Constitutional:       General: She is not in acute distress.     Appearance: She is well-developed. She is not ill-appearing.   HENT:      Head: Normocephalic and atraumatic.      Mouth/Throat:      Mouth: Mucous membranes are moist.   Eyes:      Extraocular Movements: Extraocular movements intact.      Conjunctiva/sclera: Conjunctivae normal.   Cardiovascular:      Rate and Rhythm: Normal rate and regular rhythm.      Heart sounds: No murmur heard.  Pulmonary:      Effort: Pulmonary effort is normal. No respiratory distress.      Breath sounds: Normal breath sounds.   Abdominal:      Palpations: Abdomen is soft.      Tenderness: There is no abdominal tenderness.   Musculoskeletal:         General: No swelling. Normal range of motion.      Cervical back: Neck supple.   Skin:     General: Skin is warm and dry.       Capillary Refill: Capillary refill takes less than 2 seconds.   Neurological:      General: No focal deficit present.      Mental Status: She is alert.   Psychiatric:         Mood and Affect: Mood is anxious.      Comments: Crying, hyperventilating          ED Medications  Medications   LORazepam (ATIVAN) injection 1 mg (1 mg Intravenous Given 6/24/24 0932)       Diagnostic Studies  Results Reviewed       Procedure Component Value Units Date/Time    Quantitative hCG [[id number]]  (Normal) Collected: 06/25/24 0057    Lab Status: Final result Specimen: Blood from Arm, Right Updated: 06/25/24 0135     HCG, Quant <0.6 mIU/mL     Narrative:       Expected Ranges:    HCG results between 5.0 and 25.0 mIU/mL may be indicative of early pregnancy but should be interpreted in light of the total clinical presentation.    HCG can rise to detectable levels in maritza and post menopausal women (0-11.6 mIU/mL).     Approximate               Approximate HCG  Gestation age          Concentration ( mIU/mL)  _____________          ______________________   Weeks                      HCG values  0.2-1                       5-50  1-2                           2-3                         100-5000  3-4                         500-77124  4-5                         1000-56170  5-6                         [id number]  6-8                         [id number]  8-12                        [id number]      Basic metabolic panel [[id number]] Updated: 06/25/24 0116    Lab Status: No result Specimen: Blood from Arm, Right     CBC and differential [[id number]]  (Abnormal) Collected: 06/25/24 0057    Lab Status: Final result Specimen: Blood from Arm, Right Updated: 06/25/24 0103     WBC 11.37 Thousand/uL      RBC 4.31 Million/uL      Hemoglobin 13.8 g/dL      Hematocrit 41.0 %      MCV 95 fL      MCH 32.0 pg      MCHC 33.7 g/dL      RDW 12.6 %      MPV 10.5 fL      Platelets 217 Thousands/uL      nRBC 0 /100 WBCs      Segmented % 74 %       Immature Grans % 1 %      Lymphocytes % 15 %      Monocytes % 8 %      Eosinophils Relative 1 %      Basophils Relative 1 %      Absolute Neutrophils 8.49 Thousands/µL      Absolute Immature Grans 0.06 Thousand/uL      Absolute Lymphocytes 1.72 Thousands/µL      Absolute Monocytes 0.91 Thousand/µL      Eosinophils Absolute 0.13 Thousand/µL      Basophils Absolute 0.06 Thousands/µL                    XR chest 2 views   ED Interpretation by Kevin Nagel DO (06/25 0112)   Wet read - normal cxr             Procedures  Procedures      ED Course  ED Course as of 06/25/24 0230   Mon Jun 24, 2024   2325 EKG sinus tach 107 no ischemic changes no DAMIR or STD normal qtc <500                                       Medical Decision Making  Patient is a 34-year-old female presents for evaluation of syncopal event.  Likely secondary to anxiety.  Patient having panic attack in the emergency department.  Will treat symptomatically with Ativan.  Will obtain basic labs, check EKG and chest x-ray    EKG normal.  Labs unremarkable.  Chest x-ray normal    On reassessment patient feels improved after medications no longer feeling anxious.  She is hemodynamically stable and cleared for discharge outpatient follow-up with her PCP.  Return precautions given patient verbalized understanding    Amount and/or Complexity of Data Reviewed  Labs: ordered.  Radiology: ordered and independent interpretation performed.    Risk  Prescription drug management.          Disposition  Final diagnoses:   Syncope   Anxiety     Time reflects when diagnosis was documented in both MDM as applicable and the Disposition within this note       Time User Action Codes Description Comment    6/25/2024 12:58 AM CheckuCrt [R55] Syncope     6/25/2024 12:58 AM CheckCurt Add [F41.9] Anxiety     6/25/2024 12:58 AM CheckCurt Modify [R55] Syncope     6/25/2024 12:58 AM CheckCurt Modify [F41.9] Anxiety           ED Disposition       ED Disposition    Discharge    Condition   Stable    Date/Time   Tue Jun 25, 2024  1:36 AM    Comment   Odilia Zaira Zepeda discharge to home/self care.                   Follow-up Information       Follow up With Specialties Details Why Contact Info    [facility]  Call in 2 days  [phone]              Discharge Medication List as of 6/25/2024  1:37 AM        CONTINUE these medications which have NOT CHANGED    Details   ARIPiprazole (ABILIFY) 5 mg tablet Take 5 mg by mouth daily, Starting Mon 5/1/2023, Historical Med      benzonatate (TESSALON) 200 MG capsule TAKE 1 CAPSULE BY MOUTH THREE TIMES A DAY AS NEEDED FOR COUGH, Historical Med      buPROPion (WELLBUTRIN SR) 100 mg 12 hr tablet Take 100 mg by mouth daily after breakfast, Historical Med      busPIRone (BUSPAR) 5 mg tablet Take 5 mg by mouth 3 (three) times a day, Historical Med      cloNIDine (CATAPRES) 0.1 mg tablet Take 0.1 mg by mouth every 12 (twelve) hours, Historical Med      cyclobenzaprine (FLEXERIL) 10 mg tablet Take 10 mg by mouth 3 (three) times a day, Starting Sun 6/4/2023, Historical Med      diazepam (VALIUM) 5 mg tablet Take 5 mg by mouth every 8 (eight) hours as needed, Starting Sun 6/4/2023, Historical Med      gabapentin (NEURONTIN) 400 mg capsule Take 400 mg by mouth 3 (three) times a day, Starting Tue 5/16/2023, Historical Med      hydrOXYzine HCL (ATARAX) 50 mg tablet TAKE 0.5 TABLETS (25 MG TOTAL) BY MOUTH EVERY 6 (SIX) HOURS AS NEEDED FOR ANXIETY, Starting Tue 8/1/2023, Until Sun 1/28/2024 at 2359, Normal      meclizine (ANTIVERT) 12.5 MG tablet TAKE 1 TABLET (12.5 MG TOTAL) BY MOUTH EVERY 8 (EIGHT) HOURS AS NEEDED FOR DIZZINESS., Historical Med      naproxen (NAPROSYN) 500 mg tablet TAKE 1 TABLET BY MOUTH TWICE A DAY WITH MEALS, Normal      ondansetron (ZOFRAN) 4 mg tablet Take 1 tablet (4 mg total) by mouth every 6 (six) hours, Starting Mon 1/16/2023, Normal      ondansetron (ZOFRAN-ODT) 4 mg disintegrating tablet TAKE 1 TABLET BY MOUTH EVERY 8  HOURS AS NEEDED FOR NAUSEA AND VOMITING, Historical Med      oxyCODONE (OXY-IR) 5 MG capsule Take 5 mg by mouth every 4 (four) hours as needed, Starting Wed 6/7/2023, Historical Med      QUEtiapine (SEROquel) 25 mg tablet Take 50 mg by mouth daily at bedtime, Historical Med      sertraline (ZOLOFT) 100 mg tablet TAKE 2 TABLETS (200 MG TOTAL) BY MOUTH DAILY AT BEDTIME, Starting Mon 11/21/2022, Normal      venlafaxine (EFFEXOR-XR) 37.5 mg 24 hr capsule Take 75 mg by mouth every morning Patient taking 150mg, Starting Wed 1/18/2023, Historical Med      Xarelto 20 MG tablet TAKE 1 TABLET BY MOUTH DAILY WITH BREAKFAST, Normal           No discharge procedures on file.    PDMP Review         Value Time User    PDMP Reviewed  Yes 7/7/2022  6:03 PM Anna Amaya MD             ED Provider  Attending physically available and evaluated Odilia Zepeda. I managed the patient along with the ED Attending.    Electronically Signed by           Kevin Nagel DO  06/25/24 0234

## 2024-08-15 ENCOUNTER — OFFICE VISIT (OUTPATIENT)
Dept: OBGYN CLINIC | Facility: CLINIC | Age: 34
End: 2024-08-15

## 2024-08-15 VITALS
HEART RATE: 77 BPM | HEIGHT: 66 IN | BODY MASS INDEX: 27.03 KG/M2 | DIASTOLIC BLOOD PRESSURE: 87 MMHG | SYSTOLIC BLOOD PRESSURE: 128 MMHG | WEIGHT: 168.2 LBS

## 2024-08-15 DIAGNOSIS — N76.0 BV (BACTERIAL VAGINOSIS): ICD-10-CM

## 2024-08-15 DIAGNOSIS — Z11.3 SCREEN FOR STD (SEXUALLY TRANSMITTED DISEASE): Primary | ICD-10-CM

## 2024-08-15 DIAGNOSIS — B96.89 BV (BACTERIAL VAGINOSIS): ICD-10-CM

## 2024-08-15 PROCEDURE — 87491 CHLMYD TRACH DNA AMP PROBE: CPT | Performed by: NURSE PRACTITIONER

## 2024-08-15 PROCEDURE — 87210 SMEAR WET MOUNT SALINE/INK: CPT | Performed by: NURSE PRACTITIONER

## 2024-08-15 PROCEDURE — 99213 OFFICE O/P EST LOW 20 MIN: CPT | Performed by: NURSE PRACTITIONER

## 2024-08-15 PROCEDURE — 87591 N.GONORRHOEAE DNA AMP PROB: CPT | Performed by: NURSE PRACTITIONER

## 2024-08-15 RX ORDER — LORAZEPAM 0.5 MG/1
0.5 TABLET ORAL EVERY 8 HOURS PRN
COMMUNITY

## 2024-08-15 RX ORDER — METRONIDAZOLE 7.5 MG/G
1 GEL VAGINAL
Qty: 5 G | Refills: 0 | Status: SHIPPED | OUTPATIENT
Start: 2024-08-15 | End: 2024-08-20

## 2024-08-15 NOTE — PROGRESS NOTES
PROBLEM GYNECOLOGICAL VISIT    Odilia Zepeda is a 34 y.o. female who presents today with complaint of vaginal odor.  Her general medical history has been reviewed and she reports it as follows:    Past Medical History:   Diagnosis Date    Abnormal Pap smear of cervix     Anxiety     Depression     History of positive PPD     Hyperlipidemia     Menorrhagia     Pelvic kidney     left side    PTSD (post-traumatic stress disorder)     STD (sexually transmitted disease)     Thrombosis of ovarian vein      Past Surgical History:   Procedure Laterality Date    APPENDECTOMY      CYSTOSCOPY N/A 2022    Procedure: CYSTOSCOPY, INSERTION URETERAL CATHETERS;  Surgeon: Danny Brower MD;  [facility];  Service: Gynecology Oncology    NE COLPOSCOPY CERVIX VAG LOOP ELTRD BX CERVIX N/A 2020    Procedure: BIOPSY CONE/COLD KNIFE CERVIX, ECC;  Surgeon: Gaudencio Gonsales MD;  [facility];  Service: Gynecology    NE LAPS TOTAL HYSTERECT 250 GM/< W/RMVL TUBE/OVARY N/A 2022    Procedure: HYSTERECTOMY LAPAROSCOPIC TOTAL (LTH) W/ ROBOTICS, BILATERAL SALPINGECTOMY;  Surgeon: Danny Brower MD;  [facility];  Service: Gynecology Oncology     OB History          4    Para   4    Term   3       1    AB        Living   4         SAB        IAB        Ectopic        Multiple   0    Live Births   4               Social History     Tobacco Use    Smoking status: Some Days     Current packs/day: 0.25     Average packs/day: 0.3 packs/day for 10.0 years (2.5 ttl pk-yrs)     Types: Cigarettes    Smokeless tobacco: Never    Tobacco comments:     4 cig / day    Vaping Use    Vaping status: Never Used   Substance Use Topics    Alcohol use: Yes     Alcohol/week: 24.0 standard drinks of alcohol     Types: 24 Standard drinks or equivalent per week     Comment: socially    Drug use: No     Social History     Substance and Sexual Activity   Sexual Activity Yes    Partners: Male     "Birth control/protection: Female Sterilization       Current Outpatient Medications   Medication Instructions    ARIPiprazole (ABILIFY) 5 mg, Daily    benzonatate (TESSALON) 200 MG capsule TAKE 1 CAPSULE BY MOUTH THREE TIMES A DAY AS NEEDED FOR COUGH    buPROPion (WELLBUTRIN SR) 100 mg, Daily after breakfast    busPIRone (BUSPAR) 5 mg, Oral, 3 times daily    cloNIDine (CATAPRES) 0.1 mg, Oral, Every 12 hours scheduled    cyclobenzaprine (FLEXERIL) 10 mg, 3 times daily    diazepam (VALIUM) 5 mg, Oral, Every 8 hours PRN    gabapentin (NEURONTIN) 400 mg, Oral, 3 times daily    hydrOXYzine HCL (ATARAX) 25 mg, Oral, Every 6 hours PRN    LORazepam (ATIVAN) 0.5 mg, Oral, Every 8 hours PRN    meclizine (ANTIVERT) 12.5 MG tablet TAKE 1 TABLET (12.5 MG TOTAL) BY MOUTH EVERY 8 (EIGHT) HOURS AS NEEDED FOR DIZZINESS.    naproxen (NAPROSYN) 500 mg tablet TAKE 1 TABLET BY MOUTH TWICE A DAY WITH MEALS    ondansetron (ZOFRAN) 4 mg, Oral, Every 6 hours    ondansetron (ZOFRAN-ODT) 4 mg disintegrating tablet TAKE 1 TABLET BY MOUTH EVERY 8 HOURS AS NEEDED FOR NAUSEA AND VOMITING    oxyCODONE (OXY-IR) 5 mg, Every 4 hours PRN    QUEtiapine (SEROQUEL) 50 mg, Oral, Daily at bedtime    sertraline (ZOLOFT) 200 mg, Oral, Daily at bedtime    venlafaxine (EFFEXOR-XR) 75 mg, Oral, Every morning, Patient taking 150mg    Xarelto 20 MG tablet TAKE 1 TABLET BY MOUTH DAILY WITH BREAKFAST       History of Present Illness:   Odilia presents today reporting two days of vaginal odor. She reports a slight increase in vaginal discharge. She denies any itching or irritation. No new hygiene products. Has recently had a new sexual partner.     Review of Systems:  Review of Systems   Constitutional: Negative.    Gastrointestinal: Negative.    Genitourinary:  Positive for vaginal discharge.        Vaginal odor        Physical Exam:  Ht 5' 6\" (1.676 m)   Wt 76.3 kg (168 lb 3.2 oz)   LMP  (LMP Unknown)   BMI 27.15 kg/m²   Physical Exam  Constitutional:       " General: She is not in acute distress.     Appearance: Normal appearance.   Genitourinary:      Vulva normal.      No lesions in the vagina.      Vaginal discharge present.      No vaginal ulceration.   Neurological:      Mental Status: She is alert.   Skin:     General: Skin is warm and dry.   Psychiatric:         Mood and Affect: Mood normal.         Behavior: Behavior normal.   Vitals reviewed.         Point of Care Testing:   -Wet mount: +clue cells, -yeast, -trich    -KOH mount: +yeast   -Whiff: positive    -pH 5.5    Assessment:   1. Bacterial vaginosis    2. STI screening     Plan:   1. Rx for Metrogel    2. Reviewed vulvar skin care measures and vaginitis prevention measures.    3. STI screening collected    4. Return for annual exam or sooner if needed.     Reviewed with patient that test results are available in [facility] immediately, but that they will not necessarily be reviewed by me immediately.  Explained that I will review results at my earliest opportunity and contact patient appropriately.

## 2024-08-16 LAB
C TRACH DNA SPEC QL NAA+PROBE: NEGATIVE
N GONORRHOEA DNA SPEC QL NAA+PROBE: NEGATIVE

## 2024-08-27 ENCOUNTER — APPOINTMENT (EMERGENCY)
Dept: RADIOLOGY | Facility: HOSPITAL | Age: 34
End: 2024-08-27
Payer: MEDICARE

## 2024-08-27 ENCOUNTER — HOSPITAL ENCOUNTER (EMERGENCY)
Facility: HOSPITAL | Age: 34
Discharge: HOME | End: 2024-08-27
Attending: EMERGENCY MEDICINE
Payer: MEDICARE

## 2024-08-27 VITALS
SYSTOLIC BLOOD PRESSURE: 113 MMHG | TEMPERATURE: 97.6 F | OXYGEN SATURATION: 95 % | HEART RATE: 70 BPM | DIASTOLIC BLOOD PRESSURE: 62 MMHG | RESPIRATION RATE: 23 BRPM

## 2024-08-27 DIAGNOSIS — R07.89 CHEST WALL PAIN: Primary | ICD-10-CM

## 2024-08-27 DIAGNOSIS — R42 LIGHTHEADEDNESS: ICD-10-CM

## 2024-08-27 DIAGNOSIS — F41.9 ANXIETY: ICD-10-CM

## 2024-08-27 LAB
ALBUMIN SERPL BCG-MCNC: 4.6 G/DL (ref 3.5–5)
ALP SERPL-CCNC: 57 U/L (ref 34–104)
ALT SERPL W P-5'-P-CCNC: 20 U/L (ref 7–52)
ANION GAP SERPL CALCULATED.3IONS-SCNC: 9 MMOL/L (ref 4–13)
AST SERPL W P-5'-P-CCNC: 13 U/L (ref 13–39)
ATRIAL RATE: 87 BPM
BASOPHILS # BLD AUTO: 0.06 THOUSANDS/ÂΜL (ref 0–0.1)
BASOPHILS NFR BLD AUTO: 1 % (ref 0–1)
BILIRUB SERPL-MCNC: 0.49 MG/DL (ref 0.2–1)
BUN SERPL-MCNC: 9 MG/DL (ref 5–25)
CALCIUM SERPL-MCNC: 9.8 MG/DL (ref 8.4–10.2)
CARDIAC TROPONIN I PNL SERPL HS: <2 NG/L
CARDIAC TROPONIN I PNL SERPL HS: <2 NG/L
CHLORIDE SERPL-SCNC: 107 MMOL/L (ref 96–108)
CO2 SERPL-SCNC: 23 MMOL/L (ref 21–32)
CREAT SERPL-MCNC: 0.75 MG/DL (ref 0.6–1.3)
D DIMER PPP FEU-MCNC: 0.49 UG/ML FEU
EOSINOPHIL # BLD AUTO: 0.17 THOUSAND/ÂΜL (ref 0–0.61)
EOSINOPHIL NFR BLD AUTO: 2 % (ref 0–6)
ERYTHROCYTE [DISTWIDTH] IN BLOOD BY AUTOMATED COUNT: 12.5 % (ref 11.6–15.1)
GFR SERPL CREATININE-BSD FRML MDRD: 104 ML/MIN/1.73SQ M
GLUCOSE SERPL-MCNC: 90 MG/DL (ref 65–140)
HCT VFR BLD AUTO: 44.2 % (ref 34.8–46.1)
HGB BLD-MCNC: 14.9 G/DL (ref 11.5–15.4)
IMM GRANULOCYTES # BLD AUTO: 0.05 THOUSAND/UL (ref 0–0.2)
IMM GRANULOCYTES NFR BLD AUTO: 1 % (ref 0–2)
LIPASE SERPL-CCNC: 32 U/L (ref 11–82)
LYMPHOCYTES # BLD AUTO: 1.93 THOUSANDS/ÂΜL (ref 0.6–4.47)
LYMPHOCYTES NFR BLD AUTO: 18 % (ref 14–44)
MCH RBC QN AUTO: 31.8 PG (ref 26.8–34.3)
MCHC RBC AUTO-ENTMCNC: 33.7 G/DL (ref 31.4–37.4)
MCV RBC AUTO: 94 FL (ref 82–98)
MONOCYTES # BLD AUTO: 0.85 THOUSAND/ÂΜL (ref 0.17–1.22)
MONOCYTES NFR BLD AUTO: 8 % (ref 4–12)
NEUTROPHILS # BLD AUTO: 7.68 THOUSANDS/ÂΜL (ref 1.85–7.62)
NEUTS SEG NFR BLD AUTO: 70 % (ref 43–75)
NRBC BLD AUTO-RTO: 0 /100 WBCS
P AXIS: 52 DEGREES
PLATELET # BLD AUTO: 189 THOUSANDS/UL (ref 149–390)
PMV BLD AUTO: 10.9 FL (ref 8.9–12.7)
POTASSIUM SERPL-SCNC: 3.8 MMOL/L (ref 3.5–5.3)
PR INTERVAL: 120 MS
PROT SERPL-MCNC: 7 G/DL (ref 6.4–8.4)
QRS AXIS: 77 DEGREES
QRSD INTERVAL: 98 MS
QT INTERVAL: 358 MS
QTC INTERVAL: 430 MS
RBC # BLD AUTO: 4.69 MILLION/UL (ref 3.81–5.12)
SODIUM SERPL-SCNC: 139 MMOL/L (ref 135–147)
T WAVE AXIS: 6 DEGREES
VENTRICULAR RATE: 87 BPM
WBC # BLD AUTO: 10.74 THOUSAND/UL (ref 4.31–10.16)

## 2024-08-27 PROCEDURE — 99285 EMERGENCY DEPT VISIT HI MDM: CPT | Performed by: EMERGENCY MEDICINE

## 2024-08-27 PROCEDURE — 96365 THER/PROPH/DIAG IV INF INIT: CPT

## 2024-08-27 PROCEDURE — 93005 ELECTROCARDIOGRAM TRACING: CPT

## 2024-08-27 PROCEDURE — 80053 COMPREHEN METABOLIC PANEL: CPT | Performed by: EMERGENCY MEDICINE

## 2024-08-27 PROCEDURE — 96375 TX/PRO/DX INJ NEW DRUG ADDON: CPT

## 2024-08-27 PROCEDURE — 85379 FIBRIN DEGRADATION QUANT: CPT

## 2024-08-27 PROCEDURE — 85025 COMPLETE CBC W/AUTO DIFF WBC: CPT | Performed by: EMERGENCY MEDICINE

## 2024-08-27 PROCEDURE — 36415 COLL VENOUS BLD VENIPUNCTURE: CPT

## 2024-08-27 PROCEDURE — 96366 THER/PROPH/DIAG IV INF ADDON: CPT

## 2024-08-27 PROCEDURE — 93010 ELECTROCARDIOGRAM REPORT: CPT | Performed by: INTERNAL MEDICINE

## 2024-08-27 PROCEDURE — 99285 EMERGENCY DEPT VISIT HI MDM: CPT

## 2024-08-27 PROCEDURE — 83690 ASSAY OF LIPASE: CPT

## 2024-08-27 PROCEDURE — 84484 ASSAY OF TROPONIN QUANT: CPT | Performed by: EMERGENCY MEDICINE

## 2024-08-27 PROCEDURE — 71045 X-RAY EXAM CHEST 1 VIEW: CPT

## 2024-08-27 RX ORDER — KETOROLAC TROMETHAMINE 30 MG/ML
15 INJECTION, SOLUTION INTRAMUSCULAR; INTRAVENOUS ONCE
Status: COMPLETED | OUTPATIENT
Start: 2024-08-27 | End: 2024-08-27

## 2024-08-27 RX ORDER — SODIUM CHLORIDE, SODIUM GLUCONATE, SODIUM ACETATE, POTASSIUM CHLORIDE, MAGNESIUM CHLORIDE, SODIUM PHOSPHATE, DIBASIC, AND POTASSIUM PHOSPHATE .53; .5; .37; .037; .03; .012; .00082 G/100ML; G/100ML; G/100ML; G/100ML; G/100ML; G/100ML; G/100ML
1000 INJECTION, SOLUTION INTRAVENOUS ONCE
Status: COMPLETED | OUTPATIENT
Start: 2024-08-27 | End: 2024-08-27

## 2024-08-27 RX ORDER — ACETAMINOPHEN 325 MG/1
975 TABLET ORAL ONCE
Status: COMPLETED | OUTPATIENT
Start: 2024-08-27 | End: 2024-08-27

## 2024-08-27 RX ORDER — LIDOCAINE 50 MG/G
1 PATCH TOPICAL ONCE
Status: DISCONTINUED | OUTPATIENT
Start: 2024-08-27 | End: 2024-08-27 | Stop reason: HOSPADM

## 2024-08-27 RX ORDER — LORAZEPAM 0.5 MG/1
0.5 TABLET ORAL ONCE
Status: COMPLETED | OUTPATIENT
Start: 2024-08-27 | End: 2024-08-27

## 2024-08-27 RX ORDER — MECLIZINE HCL 12.5 MG 12.5 MG/1
12.5 TABLET ORAL ONCE
Status: COMPLETED | OUTPATIENT
Start: 2024-08-27 | End: 2024-08-27

## 2024-08-27 RX ADMIN — KETOROLAC TROMETHAMINE 15 MG: 30 INJECTION, SOLUTION INTRAMUSCULAR; INTRAVENOUS at 12:38

## 2024-08-27 RX ADMIN — SODIUM CHLORIDE, SODIUM GLUCONATE, SODIUM ACETATE, POTASSIUM CHLORIDE, MAGNESIUM CHLORIDE, SODIUM PHOSPHATE, DIBASIC, AND POTASSIUM PHOSPHATE 1000 ML: .53; .5; .37; .037; .03; .012; .00082 INJECTION, SOLUTION INTRAVENOUS at 12:39

## 2024-08-27 RX ADMIN — LIDOCAINE 1 PATCH: 50 PATCH CUTANEOUS at 13:51

## 2024-08-27 RX ADMIN — MECLIZINE HCL 12.5 MG 12.5 MG: 12.5 TABLET ORAL at 12:36

## 2024-08-27 RX ADMIN — LORAZEPAM 0.5 MG: 0.5 TABLET ORAL at 12:36

## 2024-08-27 RX ADMIN — ACETAMINOPHEN 975 MG: 325 TABLET ORAL at 12:36

## 2024-08-27 NOTE — ED PROVIDER NOTES
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 The chest pain started yesterday while she was sitting working from home. Pt describe the chest pain as an intermittent sharp shooting that radiates to the left shoulder and sometimes to the left arm. Pt notes each episode would last for 3-5 minutes and is associated with nausea and room spinning dizziness. Pt did not take anything for her symptoms. Pt notes simmilar episode 2 months ago when she had an anxiety attack, this episode is more persistent. Not taking OCP. Pt denies recent illness, vomiting, abdominal pain, recent travels,     Admits to smoking less than half a pack of cigarettes a day and drinks socially.       Chest Pain  Associated symptoms: dizziness, nausea, numbness and shortness of breath    Associated symptoms: no abdominal pain, no back pain, no cough, no fever, no palpitations and not vomiting        Prior to Admission Medications   Prescriptions Last Dose Informant Patient Reported? Taking?   ARIPiprazole (ABILIFY) 5 mg tablet   Yes No   Sig: Take 5 mg by mouth daily   Patient not taking: Reported on 12/13/2023   LORazepam (ATIVAN) 0.5 mg tablet  Self Yes No   Sig: Take 0.5 mg by mouth every 8 (eight) hours as needed for anxiety   QUEtiapine (SEROquel) 25 mg tablet  Self Yes No   Sig: Take 50 mg by mouth daily at bedtime   Xarelto 20 MG tablet   No No   Sig: TAKE 1 TABLET BY MOUTH DAILY WITH BREAKFAST   Patient not taking: Reported on 6/7/2023   benzonatate (TESSALON) 200 MG capsule   Yes No   Sig: TAKE 1 CAPSULE BY MOUTH THREE TIMES A DAY AS NEEDED FOR COUGH   Patient not taking: Reported on 12/13/2023   buPROPion  (WELLBUTRIN SR) 100 mg 12 hr tablet  Self Yes No   Sig: Take 100 mg by mouth daily after breakfast   Patient not taking: Reported on 6/7/2023   busPIRone (BUSPAR) 5 mg tablet  Self Yes No   Sig: Take 5 mg by mouth 3 (three) times a day   cloNIDine (CATAPRES) 0.1 mg tablet  Self Yes No   Sig: Take 0.1 mg by mouth every 12 (twelve) hours   cyclobenzaprine (FLEXERIL) 10 mg tablet   Yes No   Sig: Take 10 mg by mouth 3 (three) times a day   Patient not taking: Reported on 8/3/2023   diazepam (VALIUM) 5 mg tablet   Yes No   Sig: Take 5 mg by mouth every 8 (eight) hours as needed   gabapentin (NEURONTIN) 400 mg capsule   Yes No   Sig: Take 400 mg by mouth 3 (three) times a day   hydrOXYzine HCL (ATARAX) 50 mg tablet   No No   Sig: TAKE 0.5 TABLETS (25 MG TOTAL) BY MOUTH EVERY 6 (SIX) HOURS AS NEEDED FOR ANXIETY   meclizine (ANTIVERT) 12.5 MG tablet   Yes No   Sig: TAKE 1 TABLET (12.5 MG TOTAL) BY MOUTH EVERY 8 (EIGHT) HOURS AS NEEDED FOR DIZZINESS.   Patient not taking: Reported on 8/3/2023   naproxen (NAPROSYN) 500 mg tablet   No No   Sig: TAKE 1 TABLET BY MOUTH TWICE A DAY WITH MEALS   Patient not taking: Reported on 6/7/2023   ondansetron (ZOFRAN) 4 mg tablet  Self No No   Sig: Take 1 tablet (4 mg total) by mouth every 6 (six) hours   Patient not taking: Reported on 6/7/2023   ondansetron (ZOFRAN-ODT) 4 mg disintegrating tablet  Self Yes No   Sig: TAKE 1 TABLET BY MOUTH EVERY 8 HOURS AS NEEDED FOR NAUSEA AND VOMITING   Patient not taking: Reported on 6/7/2023   oxyCODONE (OXY-IR) 5 MG capsule   Yes No   Sig: Take 5 mg by mouth every 4 (four) hours as needed   Patient not taking: Reported on 8/3/2023   sertraline (ZOLOFT) 100 mg tablet  Self No No   Sig: TAKE 2 TABLETS (200 MG TOTAL) BY MOUTH DAILY AT BEDTIME   Patient not taking: Reported on 6/7/2023   venlafaxine (EFFEXOR-XR) 37.5 mg 24 hr capsule  Self Yes No   Sig: Take 75 mg by mouth every morning Patient taking 150mg      Facility-Administered Medications: None        Past Medical History:   Diagnosis Date    Abnormal Pap smear of cervix     Anxiety     Depression     History of positive PPD     Hyperlipidemia     Menorrhagia     Pelvic kidney     left side    PTSD (post-traumatic stress disorder)     STD (sexually transmitted disease)     Thrombosis of ovarian vein        Past Surgical History:   Procedure Laterality Date    APPENDECTOMY      CYSTOSCOPY N/A 12/19/2022    Procedure: CYSTOSCOPY, INSERTION URETERAL CATHETERS;  Surgeon: Danny Brower MD;  Location: [facility];  Service: Gynecology Oncology    MN COLPOSCOPY CERVIX VAG LOOP ELTRD BX CERVIX N/A 7/31/2020    Procedure: BIOPSY CONE/COLD KNIFE CERVIX, ECC;  Surgeon: Gaudencio Gonsales MD;  Location: [facility];  Service: Gynecology    MN LAPS TOTAL HYSTERECT 250 GM/< W/RMVL TUBE/OVARY N/A 12/19/2022    Procedure: HYSTERECTOMY LAPAROSCOPIC TOTAL (LTH) W/ ROBOTICS, BILATERAL SALPINGECTOMY;  Surgeon: Danny Brower MD;  Location: [facility];  Service: Gynecology Oncology       Family History   Problem Relation Age of Onset    Crohn's disease Brother     Crohn's disease Maternal Aunt     Diabetes Maternal Grandmother     Clotting disorder Maternal Grandmother     Hypertension Maternal Grandmother     Diabetes Maternal Grandfather     Diabetes Paternal Grandmother     Diabetes Paternal Grandfather     COPD Mother     Diabetes Father     Hypertension Father     Depression Sister      I have reviewed and agree with the history as documented.    E-Cigarette/Vaping    E-Cigarette Use Never User      E-Cigarette/Vaping Substances    Nicotine No     THC No     CBD No     Flavoring No     Other No     Unknown No      Social History     Tobacco Use    Smoking status: Every Day     Current packs/day: 0.25     Average packs/day: 0.3 packs/day for 10.0 years (2.5 ttl pk-yrs)     Types: Cigarettes    Smokeless tobacco: Never    Tobacco comments:     4 cig / day    Vaping Use    Vaping status: Never Used   Substance  Use Topics    Alcohol use: Yes     Alcohol/week: 24.0 standard drinks of alcohol     Types: 24 Standard drinks or equivalent per week     Comment: socially    Drug use: No        Review of Systems   Constitutional:  Negative for chills and fever.   HENT:  Negative for ear pain and sore throat.    Eyes:  Negative for pain and visual disturbance.   Respiratory:  Positive for shortness of breath. Negative for cough.    Cardiovascular:  Positive for chest pain. Negative for palpitations.   Gastrointestinal:  Positive for nausea. Negative for abdominal pain and vomiting.   Genitourinary:  Negative for dysuria and hematuria.   Musculoskeletal:  Negative for arthralgias and back pain.   Skin:  Negative for color change and rash.   Neurological:  Positive for dizziness and numbness. Negative for seizures and syncope.   All other systems reviewed and are negative.      Physical Exam  ED Triage Vitals [08/27/24 1127]   Temperature Pulse Respirations Blood Pressure SpO2   97.6 °F (36.4 °C) 79 20 126/67 98 %      Temp Source Heart Rate Source Patient Position - Orthostatic VS BP Location FiO2 (%)   Tympanic Monitor Sitting Left arm --      Pain Score       6             Orthostatic Vital Signs  Vitals:    08/27/24 1127 08/27/24 1200 08/27/24 1300 08/27/24 1400   BP: 126/67 117/56 115/59 113/62   Pulse: 79 84 76 70   Patient Position - Orthostatic VS: Sitting          Physical Exam  Vitals and nursing note reviewed.   Constitutional:       General: She is not in acute distress.     Appearance: Normal appearance. She is well-developed. She is not ill-appearing, toxic-appearing or diaphoretic.      Comments: Pt appears mildly uncomfortable    HENT:      Head: Normocephalic and atraumatic.      Right Ear: External ear normal.      Left Ear: External ear normal.      Nose: Nose normal.      Mouth/Throat:      Mouth: Mucous membranes are moist.   Eyes:      General: No scleral icterus.        Right eye: No discharge.      Extraocular  Movements: Extraocular movements intact.      Conjunctiva/sclera: Conjunctivae normal.   Cardiovascular:      Rate and Rhythm: Normal rate and regular rhythm.      Pulses: Normal pulses.      Heart sounds: No murmur heard.  Pulmonary:      Effort: Pulmonary effort is normal. No respiratory distress.      Breath sounds: Normal breath sounds. No decreased breath sounds or wheezing.   Chest:      Chest wall: No tenderness.       Abdominal:      General: Abdomen is flat. There is no distension.      Palpations: Abdomen is soft.      Tenderness: There is no abdominal tenderness. There is no right CVA tenderness or left CVA tenderness.   Musculoskeletal:         General: No swelling, deformity or signs of injury. Normal range of motion.      Cervical back: Normal range of motion and neck supple. No rigidity or tenderness.      Right lower leg: No edema.      Left lower leg: No edema.   Skin:     General: Skin is warm and dry.      Capillary Refill: Capillary refill takes less than 2 seconds.   Neurological:      General: No focal deficit present.      Mental Status: She is alert and oriented to person, place, and time.   Psychiatric:         Mood and Affect: Mood normal.         ED Medications  Medications   ketorolac (TORADOL) injection 15 mg (15 mg Intravenous Given 8/27/24 1238)   acetaminophen (TYLENOL) tablet 975 mg (975 mg Oral Given 8/27/24 1236)   multi-electrolyte (ISOLYTE-S PH 7.4) bolus 1,000 mL (0 mL Intravenous Stopped 8/27/24 1431)   LORazepam (ATIVAN) tablet 0.5 mg (0.5 mg Oral Given 8/27/24 1236)   meclizine (ANTIVERT) tablet 12.5 mg (12.5 mg Oral Given 8/27/24 1236)       Diagnostic Studies  Results Reviewed       Procedure Component Value Units Date/Time    HS Troponin I 2hr [[id number]] Collected: 08/27/24 1351    Lab Status: Final result Specimen: Blood from Arm, Right Updated: 08/27/24 1427     hs TnI 2hr <2 ng/L      Delta 2hr hsTnI --    Lipase [[id number]]  (Normal) Collected: 08/27/24 1146    Lab  Status: Final result Specimen: Blood from Arm, Right Updated: 08/27/24 1349     Lipase 32 u/L     D-dimer, quantitative [[id number]]  (Normal) Collected: 08/27/24 1235    Lab Status: Final result Specimen: Blood from Arm, Right Updated: 08/27/24 1309     D-Dimer, Quant 0.49 ug/ml FEU     HS Troponin 0hr (reflex protocol) [[id number]]  (Normal) Collected: 08/27/24 1146    Lab Status: Final result Specimen: Blood from Arm, Right Updated: 08/27/24 1223     hs TnI 0hr <2 ng/L     Comprehensive metabolic panel [[id number]] Collected: 08/27/24 1146    Lab Status: Final result Specimen: Blood from Arm, Right Updated: 08/27/24 1222     Sodium 139 mmol/L      Potassium 3.8 mmol/L      Chloride 107 mmol/L      CO2 23 mmol/L      ANION GAP 9 mmol/L      BUN 9 mg/dL      Creatinine 0.75 mg/dL      Glucose 90 mg/dL      Calcium 9.8 mg/dL      AST 13 U/L      ALT 20 U/L      Alkaline Phosphatase 57 U/L      Total Protein 7.0 g/dL      Albumin 4.6 g/dL      Total Bilirubin 0.49 mg/dL      eGFR 104 ml/min/1.73sq m     Narrative:      National Kid[employer] guidelines for Chronic Kidney Disease (CKD):     Stage 1 with normal or high GFR (GFR > 90 mL/min/1.73 square meters)    Stage 2 Mild CKD (GFR = 60-89 mL/min/1.73 square meters)    Stage 3A Moderate CKD (GFR = 45-59 mL/min/1.73 square meters)    Stage 3B Moderate CKD (GFR = 30-44 mL/min/1.73 square meters)    Stage 4 Severe CKD (GFR = 15-29 mL/min/1.73 square meters)    Stage 5 End Stage CKD (GFR <15 mL/min/1.73 square meters)  Note: GFR calculation is accurate only with a steady state creatinine    CBC and differential [[id number]]  (Abnormal) Collected: 08/27/24 1146    Lab Status: Final result Specimen: Blood from Arm, Right Updated: 08/27/24 1201     WBC 10.74 Thousand/uL      RBC 4.69 Million/uL      Hemoglobin 14.9 g/dL      Hematocrit 44.2 %      MCV 94 fL      MCH 31.8 pg      MCHC 33.7 g/dL      RDW 12.5 %      MPV 10.9 fL      Platelets 189 Thousands/uL       nRBC 0 /100 WBCs      Segmented % 70 %      Immature Grans % 1 %      Lymphocytes % 18 %      Monocytes % 8 %      Eosinophils Relative 2 %      Basophils Relative 1 %      Absolute Neutrophils 7.68 Thousands/µL      Absolute Immature Grans 0.05 Thousand/uL      Absolute Lymphocytes 1.93 Thousands/µL      Absolute Monocytes 0.85 Thousand/µL      Eosinophils Absolute 0.17 Thousand/µL      Basophils Absolute 0.06 Thousands/µL                    XR chest 1 view portable   Final Result by Moon Jacobsen MD (08/27 1612)      No acute pulmonary pathology.                  Workstation performed: [id number]               Procedures  ECG 12 Lead Documentation Only    Date/Time: 8/27/2024 12:10 PM    Performed by: Leonel Barnett DO  Authorized by: Leonel Barnett DO    ECG reviewed by me, the ED Provider: yes    Patient location:  ED  Previous ECG:     Previous ECG:  Compared to current    Similarity:  Changes noted  Rate:     ECG rate:  87    ECG rate assessment: normal    Rhythm:     Rhythm: sinus rhythm    Ectopy:     Ectopy: none    QRS:     QRS axis:  Normal    QRS intervals:  Normal  Conduction:     Conduction: normal    ST segments:     ST segments:  Non-specific  T waves:     T waves: inverted      Inverted:  III        ED Course  ED Course as of 09/01/24 0951   Tue Aug 27, 2024   1257 hs TnI 0hr: <2   1323 D-Dimer, Quant: 0.49   1323 Sodium: 139   1323 WBC(!): 10.74   1323 Hemoglobin: 14.9   1323 Potassium: 3.8   1342 Upon re-evaluation pt she is feeling better, how still having left chest pain. Trop <2 will wait for 2 hr delta trop     1427 hs TnI 2hr: <2   1427 LIPASE: 32             HEART Risk Score      Flowsheet Row Most Recent Value   Heart Score Risk Calculator    History 0 Filed at: 08/27/2024 1430   ECG 0 Filed at: 08/27/2024 1430   Age 0 Filed at: 08/27/2024 1430   Risk Factors 1 Filed at: 08/27/2024 1430   Troponin 0 Filed at: 08/27/2024 1430   HEART Score 1 Filed at: 08/27/2024 1430                        SBIRT 20yo+       Flowsheet Row Most Recent Value   Initial Alcohol Screen: US AUDIT-C     1. How often do you have a drink containing alcohol? 0 Filed at: 08/27/2024 1143   2. How many drinks containing alcohol do you have on a typical day you are drinking?  0 Filed at: 08/27/2024 1149   3b. FEMALE Any Age, or MALE 65+: How often do you have 4 or more drinks on one occassion? 0 Filed at: 08/27/2024 1149   Audit-C Score 0 Filed at: 08/27/2024 1144   ЕКАТЕРИНА: How many times in the past year have you...    Used an illegal drug or used a prescription medication for non-medical reasons? 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 The chest pain started yesterday while she was sitting working from home.     Ddx: acs, PE, DVT, viral illness, chest wall pain, anxiety, dehydration, electrolyte imbalance   Will evaluate with CBC, CMP, Trop, and Ddimer  Will treat with Toradol, tylenol, meclizine, and IVF   Upon re-evaluation pt reports feeling better and was able to ambulate to and from the bathroom without difficulty.   Labs unremarkable -negative ddimer and trop   CXR negative for acute findings  Labs and imaging discussed with pt who expressed understanding and agrees with plan for D/C home with follow up with pcp . Strict return precaution given verbally as well as in discharge instructions.        Amount and/or Complexity of Data Reviewed  Labs: ordered. Decision-making details documented in ED Course.  Radiology: ordered and independent interpretation performed.  ECG/medicine tests: ordered and independent interpretation performed.    Risk  OTC drugs.  Prescription drug management.          Disposition  Final diagnoses:   Chest wall pain   Anxiety   Lightheadedness     Time  reflects when diagnosis was documented in both MDM as applicable and the Disposition within this note       Time User Action Codes Description Comment    8/27/2024  2:29 PM Leonel Barentt Add [R07.89] Chest wall pain     8/27/2024  2:29 PM VoLeonel Add [F41.9] Anxiety     8/27/2024  2:30 PM VoLeonel Add [R42] Lightheadedness           ED Disposition       ED Disposition   Discharge    Condition   Stable    Date/Time   Tue Aug 27, 2024 1429    Comment   Pionaz Stearnsiana Zepeda discharge to home/self care.                   Follow-up Information    None         Discharge Medication List as of 8/27/2024  2:34 PM        CONTINUE these medications which have NOT CHANGED    Details   ARIPiprazole (ABILIFY) 5 mg tablet Take 5 mg by mouth daily, Starting Mon 5/1/2023, Historical Med      benzonatate (TESSALON) 200 MG capsule TAKE 1 CAPSULE BY MOUTH THREE TIMES A DAY AS NEEDED FOR COUGH, Historical Med      buPROPion (WELLBUTRIN SR) 100 mg 12 hr tablet Take 100 mg by mouth daily after breakfast, Historical Med      busPIRone (BUSPAR) 5 mg tablet Take 5 mg by mouth 3 (three) times a day, Historical Med      cloNIDine (CATAPRES) 0.1 mg tablet Take 0.1 mg by mouth every 12 (twelve) hours, Historical Med      cyclobenzaprine (FLEXERIL) 10 mg tablet Take 10 mg by mouth 3 (three) times a day, Starting Sun 6/4/2023, Historical Med      diazepam (VALIUM) 5 mg tablet Take 5 mg by mouth every 8 (eight) hours as needed, Starting Sun 6/4/2023, Historical Med      gabapentin (NEURONTIN) 400 mg capsule Take 400 mg by mouth 3 (three) times a day, Starting Tue 5/16/2023, Historical Med      hydrOXYzine HCL (ATARAX) 50 mg tablet TAKE 0.5 TABLETS (25 MG TOTAL) BY MOUTH EVERY 6 (SIX) HOURS AS NEEDED FOR ANXIETY, Starting Tue 8/1/2023, Until Sun 1/28/2024 at 2359, Normal      LORazepam (ATIVAN) 0.5 mg tablet Take 0.5 mg by mouth every 8 (eight) hours as needed for anxiety, Historical Med      meclizine (ANTIVERT) 12.5 MG tablet TAKE 1 TABLET (12.5 MG  TOTAL) BY MOUTH EVERY 8 (EIGHT) HOURS AS NEEDED FOR DIZZINESS., Historical Med      naproxen (NAPROSYN) 500 mg tablet TAKE 1 TABLET BY MOUTH TWICE A DAY WITH MEALS, Normal      ondansetron (ZOFRAN) 4 mg tablet Take 1 tablet (4 mg total) by mouth every 6 (six) hours, Starting Mon 1/16/2023, Normal      ondansetron (ZOFRAN-ODT) 4 mg disintegrating tablet TAKE 1 TABLET BY MOUTH EVERY 8 HOURS AS NEEDED FOR NAUSEA AND VOMITING, Historical Med      oxyCODONE (OXY-IR) 5 MG capsule Take 5 mg by mouth every 4 (four) hours as needed, Starting Wed 6/7/2023, Historical Med      QUEtiapine (SEROquel) 25 mg tablet Take 50 mg by mouth daily at bedtime, Historical Med      sertraline (ZOLOFT) 100 mg tablet TAKE 2 TABLETS (200 MG TOTAL) BY MOUTH DAILY AT BEDTIME, Starting Mon 11/21/2022, Normal      venlafaxine (EFFEXOR-XR) 37.5 mg 24 hr capsule Take 75 mg by mouth every morning Patient taking 150mg, Starting Wed 1/18/2023, Historical Med      Xarelto 20 MG tablet TAKE 1 TABLET BY MOUTH DAILY WITH BREAKFAST, Normal               PDMP Review         Value Time User    PDMP Reviewed  Yes 7/7/2022  6:03 PM Anna Amaya MD             ED Provider  Attending physically available and evaluated Odilia Zepeda. I managed the patient along with the ED Attending.    Electronically Signed by           Leonel Barnett DO  09/01/24 0951

## 2024-08-27 NOTE — DISCHARGE INSTRUCTIONS
You were evaluated in the Emergency Department today for chest pain and anxiety.     Can take motrin and/or tylenol for pain control.    Please schedule an appointment with your primary care physician within the next 2-3 days. A referral to cardiology is given as you have been evaluated in the ED multiple times for chest pain. Please schedule an appointment with cardiology.    Return to the Emergency Department if you experience worsening or uncontrolled pain, fevers 100.4°F or greater, recurrent vomiting, inability to tolerate food or fluids by mouth, bloody stools or vomit, black or tarry stools, or any other concerning symptoms.    Thank you for choosing us for your care.

## 2024-08-27 NOTE — ED ATTENDING ATTESTATION
"I, Tonio Mixon MD, saw and evaluated the patient. I have discussed the patient with the resident and agree with the resident's findings, Plan of Care, and MDM as documented in the resident's note, except where noted. All available labs and Radiology studies were reviewed.  I was present for key portions of any procedure(s) performed by the resident and I was immediately available to provide assistance.    At this point I agree with the current assessment done in the Emergency Department.  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 Chest pain started yesterday while at rest --> described as an intermittent \"sharp shooting\" pain that radiates into the left shoulder and arm. (+) Associated nausea and dizziness. No vomiting, falls, syncope, or diaphoresis. (+) Similar episode about 2 months ago attributed to a panic attack. No LE swelling or pain. She denies weakness. No other specific complaints.    ROS: per resident physician note    Gen: anxious appearing, AA&Ox3  HEENT: PERRL, EOMI  Neck: supple  CV: RRR  Lungs: CTA B/L, (+) mild tachypnea  Abdomen: soft, NT/ND  Ext: no swelling or deformity  Neuro: 5/5 strength all extremities, sensation grossly intact  Skin: no rash    ED Course  The patient is anxious appearing and tachypneic on arrival. Vital signs are otherwise stable. Lungs are CTA B/L with good air movement. Unclear etiology of symptoms. Anxiety vs ACS vs PE vs pneumonia? Will check EKG, CXR, basic labs, troponin, and d dimer. IVFs, Toradol, APAP, and Ativan administered. Will continue to monitor in the ED. Disposition per workup and reassessment.      Critical " Care Time  Procedures

## 2025-02-27 NOTE — ASSESSMENT & PLAN NOTE
Continue Abilify      · Leidaola Jimena IUD placed in 2019 for AUB  · IUD is malpositioned in the uterus but appears to be functional for contraception

## 2025-03-27 ENCOUNTER — OFFICE VISIT (OUTPATIENT)
Dept: OBGYN CLINIC | Facility: CLINIC | Age: 35
End: 2025-03-27

## 2025-03-27 VITALS
BODY MASS INDEX: 24.53 KG/M2 | WEIGHT: 152.6 LBS | DIASTOLIC BLOOD PRESSURE: 80 MMHG | HEART RATE: 82 BPM | HEIGHT: 66 IN | SYSTOLIC BLOOD PRESSURE: 118 MMHG

## 2025-03-27 DIAGNOSIS — B37.31 VULVOVAGINAL CANDIDIASIS: Primary | ICD-10-CM

## 2025-03-27 LAB
BV WHIFF TEST VAG QL: NEGATIVE
CLUE CELLS SPEC QL WET PREP: NEGATIVE
PH SMN: 3.5 [PH]
SL AMB POCT WET MOUNT: ABNORMAL
T VAGINALIS VAG QL WET PREP: NEGATIVE
YEAST VAG QL WET PREP: POSITIVE

## 2025-03-27 RX ORDER — TIRZEPATIDE 2.5 MG/.5ML
2.5 INJECTION, SOLUTION SUBCUTANEOUS WEEKLY
COMMUNITY

## 2025-03-27 RX ORDER — FLUCONAZOLE 150 MG/1
150 TABLET ORAL
Qty: 2 TABLET | Refills: 0 | Status: SHIPPED | OUTPATIENT
Start: 2025-03-27 | End: 2025-03-31

## 2025-03-27 RX ORDER — LAMOTRIGINE 100 MG/1
100 TABLET ORAL DAILY
COMMUNITY

## 2025-03-27 NOTE — PROGRESS NOTES
PROBLEM GYNECOLOGICAL VISIT    Odilia Zepeda is a 35 y.o. female who presents today with complaint of possible vaginal infection.  Her general medical history has been reviewed and she reports it as follows:    Past Medical History:   Diagnosis Date    Abnormal Pap smear of cervix     Anxiety     Depression     History of positive PPD     Hyperlipidemia     Menorrhagia     Pelvic kidney     left side    PTSD (post-traumatic stress disorder)     STD (sexually transmitted disease)     Thrombosis of ovarian vein      Past Surgical History:   Procedure Laterality Date    APPENDECTOMY      CYSTOSCOPY N/A 2022    Procedure: CYSTOSCOPY, INSERTION URETERAL CATHETERS;  Surgeon: Danny Brower MD;  Location: [facility];  Service: Gynecology Oncology    MT COLPOSCOPY CERVIX VAG LOOP ELTRD BX CERVIX N/A 2020    Procedure: BIOPSY CONE/COLD KNIFE CERVIX, ECC;  Surgeon: Gaudencio Gonsales MD;  Location: [facility];  Service: Gynecology    MT LAPS TOTAL HYSTERECT 250 GM/< W/RMVL TUBE/OVARY N/A 2022    Procedure: HYSTERECTOMY LAPAROSCOPIC TOTAL (LTH) W/ ROBOTICS, BILATERAL SALPINGECTOMY;  Surgeon: Danny Brower MD;  Location: [facility];  Service: Gynecology Oncology     OB History          4    Para   4    Term   3       1    AB        Living   4         SAB        IAB        Ectopic        Multiple   0    Live Births   4               Social History     Tobacco Use    Smoking status: Every Day     Current packs/day: 0.25     Average packs/day: 0.3 packs/day for 10.0 years (2.5 ttl pk-yrs)     Types: Cigarettes    Smokeless tobacco: Never    Tobacco comments:     4 cig / day    Vaping Use    Vaping status: Never Used   Substance Use Topics    Alcohol use: Yes     Alcohol/week: 24.0 standard drinks of alcohol     Types: 24 Standard drinks or equivalent per week     Comment: socially    Drug use: No     Social History     Substance and Sexual Activity   Sexual Activity Yes     Partners: Male    Birth control/protection: Female Sterilization       Current Outpatient Medications   Medication Instructions    ARIPiprazole (ABILIFY) 5 mg, Daily    benzonatate (TESSALON) 200 MG capsule TAKE 1 CAPSULE BY MOUTH THREE TIMES A DAY AS NEEDED FOR COUGH    buPROPion (WELLBUTRIN SR) 100 mg, Daily after breakfast    busPIRone (BUSPAR) 5 mg, Oral, 3 times daily    cloNIDine (CATAPRES) 0.1 mg, Every 12 hours scheduled    cyclobenzaprine (FLEXERIL) 10 mg, 3 times daily    diazepam (VALIUM) 5 mg, Oral, Every 8 hours PRN    fluconazole (DIFLUCAN) 150 mg, Oral, Every 3 days    gabapentin (NEURONTIN) 400 mg, 3 times daily    hydrOXYzine HCL (ATARAX) 25 mg, Oral, Every 6 hours PRN    lamoTRIgine (LAMICTAL) 100 mg, Daily    LORazepam (ATIVAN) 0.5 mg, Every 8 hours PRN    meclizine (ANTIVERT) 12.5 MG tablet TAKE 1 TABLET (12.5 MG TOTAL) BY MOUTH EVERY 8 (EIGHT) HOURS AS NEEDED FOR DIZZINESS.    naproxen (NAPROSYN) 500 mg tablet TAKE 1 TABLET BY MOUTH TWICE A DAY WITH MEALS    OnabotulinumtoxinA (BOTULINUM TOXIN TYPE A IM) Inject into a muscle    ondansetron (ZOFRAN) 4 mg, Oral, Every 6 hours    ondansetron (ZOFRAN-ODT) 4 mg disintegrating tablet TAKE 1 TABLET BY MOUTH EVERY 8 HOURS AS NEEDED FOR NAUSEA AND VOMITING    oxyCODONE (OXY-IR) 5 mg, Every 4 hours PRN    QUEtiapine (SEROQUEL) 50 mg, Daily at bedtime    sertraline (ZOLOFT) 200 mg, Oral, Daily at bedtime    Ubrogepant (UBRELVY) 100 mg    venlafaxine (EFFEXOR-XR) 75 mg, Oral, Every morning, Patient taking 150mg    Xarelto 20 MG tablet TAKE 1 TABLET BY MOUTH DAILY WITH BREAKFAST    Zepbound 2.5 mg, Weekly       History of Present Illness:   Odilia presents today reporting a possible vaginal infection. She is experiencing vaginal irritation and itching since Monday. She also had some discomfort with intercourse that day. She denies any discharge or odor. She is currently on her second to last day of a course of Augmentin for an ear infection. No new hygiene  "products or sexual partners.      Review of Systems:  Review of Systems   Constitutional: Negative.    Gastrointestinal: Negative.    Genitourinary:         Vaginal irritation        Physical Exam:  /80 (BP Location: Right arm, Patient Position: Sitting)   Pulse 82   Ht 5' 6\" (1.676 m)   Wt 69.2 kg (152 lb 9.6 oz)   LMP  (LMP Unknown)   BMI 24.63 kg/m²   Physical Exam  Constitutional:       General: She is not in acute distress.     Appearance: Normal appearance.   Genitourinary:      Vulva normal.      No lesions in the vagina.      Vaginal discharge and erythema present.   Neurological:      Mental Status: She is alert.   Skin:     General: Skin is warm and dry.   Psychiatric:         Mood and Affect: Mood normal.         Behavior: Behavior normal.   Vitals reviewed.         Point of Care Testing:   -Wet mount: +yeast, -clue cells    -KOH mount: +yeast    -Whiff: negative        Assessment:   1. Vulvovaginal candidiasis     Plan:   1. Rx for Diflucan. Yeast likely related to antibiotic course.    2. Reviewed vulvar skin care measures.    3. Patient's depression screening was assessed with a PHQ-2 score of 0. Clinically patient does not have depression. No treatment is required.     4. Return for annual exam or sooner if needed    Reviewed with patient that test results are available in [employer] immediately, but that they will not necessarily be reviewed by me immediately.  Explained that I will review results at my earliest opportunity and contact patient appropriately.  "

## (undated) DEVICE — CHLORAPREP HI-LITE 26ML ORANGE

## (undated) DEVICE — ELECTRODE BALL E-Z CLEAN 2IN -0015

## (undated) DEVICE — KIT, BETHLEHEM ROBOTIC PROST: Brand: CARDINAL HEALTH

## (undated) DEVICE — VISUALIZATION SYSTEM: Brand: CLEARIFY

## (undated) DEVICE — NEEDLE 25G X 1 1/2

## (undated) DEVICE — COLUMN DRAPE

## (undated) DEVICE — TIP COVER ACCESSORY

## (undated) DEVICE — 40595 XL TRENDELENBURG POSITIONING KIT: Brand: 40595 XL TRENDELENBURG POSITIONING KIT

## (undated) DEVICE — GLOVE INDICATOR PI UNDERGLOVE SZ 7.5 BLUE

## (undated) DEVICE — ADHESIVE SKIN HIGH VISCOSITY EXOFIN 1ML

## (undated) DEVICE — UTERINE MANIPULATOR RUMI 6.7 X 8 CM

## (undated) DEVICE — AIRSEAL TUBE SMOKE EVAC LUMENX3 FILTERED

## (undated) DEVICE — MAYO STAND COVER: Brand: CONVERTORS

## (undated) DEVICE — GLOVE PI ULTRA TOUCH SZ.7.0

## (undated) DEVICE — GLOVE PI ULTRA TOUCH SZ.7.5

## (undated) DEVICE — TROCAR PORT ACCESS 5 X120MML W/BLDLS OPTICAL TIP AIRSEAL

## (undated) DEVICE — SYRINGE 50ML LL

## (undated) DEVICE — CHLORHEXIDINE 4PCT 4 OZ

## (undated) DEVICE — BLADELESS OBTURATOR: Brand: WECK VISTA

## (undated) DEVICE — PENCIL ELECTROSURG E-Z CLEAN -0035H

## (undated) DEVICE — SCD SEQUENTIAL COMPRESSION COMFORT SLEEVE MEDIUM KNEE LENGTH: Brand: KENDALL SCD

## (undated) DEVICE — SUT MONOCRYL 4-0 PS-2 27 IN Y426H

## (undated) DEVICE — GLOVE INDICATOR PI UNDERGLOVE SZ 7 BLUE

## (undated) DEVICE — CANNULA SEAL

## (undated) DEVICE — TRAY FOLEY 16FR URIMETER SILICONE SURESTEP

## (undated) DEVICE — STERILE CYSTO PACK: Brand: CARDINAL HEALTH

## (undated) DEVICE — SURGIFLO ENDOSCOPIC APPICATOR: Brand: ETHICON

## (undated) DEVICE — HEMOSTATIC MATRIX SURGIFLO 8ML W/THROMBIN

## (undated) DEVICE — ARM DRAPE

## (undated) DEVICE — STERILE 8 INCH PROCTO SWAB: Brand: CARDINAL HEALTH

## (undated) DEVICE — SUT STRATAFIX SPIRAL 2-0 CT-1 30 CM SXPP1B410

## (undated) DEVICE — X-RAY DETECTABLE SPONGES,16 PLY: Brand: VISTEC

## (undated) DEVICE — 1820 FOAM BLOCK NEEDLE COUNTER: Brand: DEVON

## (undated) DEVICE — ANTIBACTERIAL VIOLET BRAIDED (POLYGLACTIN 910), SYNTHETIC ABSORBABLE SUTURE: Brand: COATED VICRYL

## (undated) DEVICE — MEDI-VAC YANK SUCT HNDL W/TPRD BULBOUS TIP: Brand: CARDINAL HEALTH

## (undated) DEVICE — PREMIUM DRY TRAY LF: Brand: MEDLINE INDUSTRIES, INC.

## (undated) DEVICE — INTENDED FOR TISSUE SEPARATION, AND OTHER PROCEDURES THAT REQUIRE A SHARP SURGICAL BLADE TO PUNCTURE OR CUT.: Brand: BARD-PARKER SAFETY BLADES SIZE 15, STERILE

## (undated) DEVICE — NEEDLE HYPO 22G X 1-1/2 IN

## (undated) DEVICE — VESSEL SEALER EXTEND: Brand: ENDOWRIST

## (undated) DEVICE — BLADE BEAVER CERVICAL BIOPSY

## (undated) DEVICE — LUBRICANT INST ELECTROLUBE ANTISTK WO PAD

## (undated) DEVICE — GLOVE INDICATOR PI UNDERGLOVE SZ 8 BLUE

## (undated) DEVICE — Device

## (undated) DEVICE — INTENDED FOR TISSUE SEPARATION, AND OTHER PROCEDURES THAT REQUIRE A SHARP SURGICAL BLADE TO PUNCTURE OR CUT.: Brand: BARD-PARKER SAFETY BLADES SIZE 11, STERILE